# Patient Record
Sex: FEMALE | Race: OTHER | HISPANIC OR LATINO | URBAN - METROPOLITAN AREA
[De-identification: names, ages, dates, MRNs, and addresses within clinical notes are randomized per-mention and may not be internally consistent; named-entity substitution may affect disease eponyms.]

---

## 2017-12-29 ENCOUNTER — EMERGENCY (EMERGENCY)
Facility: HOSPITAL | Age: 82
LOS: 0 days | Discharge: HOME | End: 2017-12-29

## 2017-12-29 DIAGNOSIS — E78.5 HYPERLIPIDEMIA, UNSPECIFIED: ICD-10-CM

## 2017-12-29 DIAGNOSIS — R55 SYNCOPE AND COLLAPSE: ICD-10-CM

## 2017-12-29 DIAGNOSIS — F03.90 UNSPECIFIED DEMENTIA WITHOUT BEHAVIORAL DISTURBANCE: ICD-10-CM

## 2017-12-29 DIAGNOSIS — R25.1 TREMOR, UNSPECIFIED: ICD-10-CM

## 2017-12-29 DIAGNOSIS — I10 ESSENTIAL (PRIMARY) HYPERTENSION: ICD-10-CM

## 2017-12-29 DIAGNOSIS — S61.459A OPEN BITE OF UNSPECIFIED HAND, INITIAL ENCOUNTER: ICD-10-CM

## 2017-12-29 DIAGNOSIS — Z79.82 LONG TERM (CURRENT) USE OF ASPIRIN: ICD-10-CM

## 2018-02-07 ENCOUNTER — OUTPATIENT (OUTPATIENT)
Dept: OUTPATIENT SERVICES | Facility: HOSPITAL | Age: 83
LOS: 1 days | Discharge: HOME | End: 2018-02-07

## 2018-02-07 DIAGNOSIS — G40.919 EPILEPSY, UNSPECIFIED, INTRACTABLE, WITHOUT STATUS EPILEPTICUS: ICD-10-CM

## 2018-10-21 ENCOUNTER — EMERGENCY (EMERGENCY)
Facility: HOSPITAL | Age: 83
LOS: 0 days | Discharge: HOME | End: 2018-10-21
Attending: EMERGENCY MEDICINE | Admitting: EMERGENCY MEDICINE

## 2018-10-21 VITALS
DIASTOLIC BLOOD PRESSURE: 68 MMHG | SYSTOLIC BLOOD PRESSURE: 177 MMHG | TEMPERATURE: 99 F | OXYGEN SATURATION: 99 % | HEART RATE: 94 BPM | RESPIRATION RATE: 17 BRPM

## 2018-10-21 VITALS — WEIGHT: 113.98 LBS

## 2018-10-21 DIAGNOSIS — R33.9 RETENTION OF URINE, UNSPECIFIED: ICD-10-CM

## 2018-10-21 DIAGNOSIS — K56.41 FECAL IMPACTION: ICD-10-CM

## 2018-10-21 DIAGNOSIS — F03.90 UNSPECIFIED DEMENTIA WITHOUT BEHAVIORAL DISTURBANCE: ICD-10-CM

## 2018-10-21 LAB
ALBUMIN SERPL ELPH-MCNC: 3.5 G/DL — SIGNIFICANT CHANGE UP (ref 3.5–5.2)
ALP SERPL-CCNC: 58 U/L — SIGNIFICANT CHANGE UP (ref 30–115)
ALT FLD-CCNC: 13 U/L — SIGNIFICANT CHANGE UP (ref 0–41)
ANION GAP SERPL CALC-SCNC: 15 MMOL/L — HIGH (ref 7–14)
APPEARANCE UR: CLEAR — SIGNIFICANT CHANGE UP
AST SERPL-CCNC: 19 U/L — SIGNIFICANT CHANGE UP (ref 0–41)
BILIRUB SERPL-MCNC: 0.8 MG/DL — SIGNIFICANT CHANGE UP (ref 0.2–1.2)
BILIRUB UR-MCNC: NEGATIVE — SIGNIFICANT CHANGE UP
BUN SERPL-MCNC: 28 MG/DL — HIGH (ref 10–20)
CALCIUM SERPL-MCNC: 8.8 MG/DL — SIGNIFICANT CHANGE UP (ref 8.5–10.1)
CHLORIDE SERPL-SCNC: 99 MMOL/L — SIGNIFICANT CHANGE UP (ref 98–110)
CO2 SERPL-SCNC: 27 MMOL/L — SIGNIFICANT CHANGE UP (ref 17–32)
COLOR SPEC: SIGNIFICANT CHANGE UP
CREAT SERPL-MCNC: 1.8 MG/DL — HIGH (ref 0.7–1.5)
DIFF PNL FLD: ABNORMAL
EPI CELLS # UR: ABNORMAL /HPF
GLUCOSE SERPL-MCNC: 151 MG/DL — HIGH (ref 70–99)
GLUCOSE UR QL: NEGATIVE MG/DL — SIGNIFICANT CHANGE UP
HCT VFR BLD CALC: 37 % — SIGNIFICANT CHANGE UP (ref 37–47)
HGB BLD-MCNC: 12.2 G/DL — SIGNIFICANT CHANGE UP (ref 12–16)
KETONES UR-MCNC: NEGATIVE — SIGNIFICANT CHANGE UP
LACTATE SERPL-SCNC: 2.8 MMOL/L — HIGH (ref 0.5–2.2)
LEUKOCYTE ESTERASE UR-ACNC: NEGATIVE — SIGNIFICANT CHANGE UP
LIDOCAIN IGE QN: 8 U/L — SIGNIFICANT CHANGE UP (ref 7–60)
MCHC RBC-ENTMCNC: 31.7 PG — HIGH (ref 27–31)
MCHC RBC-ENTMCNC: 33 G/DL — SIGNIFICANT CHANGE UP (ref 32–37)
MCV RBC AUTO: 96.1 FL — SIGNIFICANT CHANGE UP (ref 81–99)
NITRITE UR-MCNC: NEGATIVE — SIGNIFICANT CHANGE UP
NRBC # BLD: 0 /100 WBCS — SIGNIFICANT CHANGE UP (ref 0–0)
PH UR: 7 — SIGNIFICANT CHANGE UP (ref 5–8)
PLATELET # BLD AUTO: 145 K/UL — SIGNIFICANT CHANGE UP (ref 130–400)
POTASSIUM SERPL-MCNC: 3.6 MMOL/L — SIGNIFICANT CHANGE UP (ref 3.5–5)
POTASSIUM SERPL-SCNC: 3.6 MMOL/L — SIGNIFICANT CHANGE UP (ref 3.5–5)
PROT SERPL-MCNC: 6.2 G/DL — SIGNIFICANT CHANGE UP (ref 6–8)
PROT UR-MCNC: ABNORMAL MG/DL
RBC # BLD: 3.85 M/UL — LOW (ref 4.2–5.4)
RBC # FLD: 13 % — SIGNIFICANT CHANGE UP (ref 11.5–14.5)
RBC CASTS # UR COMP ASSIST: ABNORMAL /HPF
SODIUM SERPL-SCNC: 141 MMOL/L — SIGNIFICANT CHANGE UP (ref 135–146)
SP GR SPEC: 1.01 — SIGNIFICANT CHANGE UP (ref 1.01–1.03)
UROBILINOGEN FLD QL: 1 MG/DL (ref 0.2–0.2)
WBC # BLD: 13.71 K/UL — HIGH (ref 4.8–10.8)
WBC # FLD AUTO: 13.71 K/UL — HIGH (ref 4.8–10.8)

## 2018-10-21 RX ORDER — SODIUM CHLORIDE 9 MG/ML
1000 INJECTION INTRAMUSCULAR; INTRAVENOUS; SUBCUTANEOUS ONCE
Qty: 0 | Refills: 0 | Status: COMPLETED | OUTPATIENT
Start: 2018-10-21 | End: 2018-10-21

## 2018-10-21 RX ADMIN — SODIUM CHLORIDE 1000 MILLILITER(S): 9 INJECTION INTRAMUSCULAR; INTRAVENOUS; SUBCUTANEOUS at 14:58

## 2018-10-21 NOTE — ED PROVIDER NOTE - MEDICAL DECISION MAKING DETAILS
Patient is a 89 yo f who presents with urinary retention that has been present over 1 day the patient has a history of dementia and cannot contribute to history. she is at her baseline mental status. she denies any abdominal pain but her daughter who is the primary historian describes her stool as loose. she denies any fevers or chills she denies any vomiting.  This has never happened to her in the past.  On physical exam she is nc/at perrla eomi oropharynx clear cta b/l, rrr s1s2 noted abd-soft nt nd bs+ bladder is distended but appears nontender, ext at baseline  from with no focal deficits  A/P- I will obtain labs, ua place nuñez cathter and continue to monitor at this time

## 2018-10-21 NOTE — ED ADULT NURSE REASSESSMENT NOTE - NS ED NURSE REASSESS COMMENT FT1
16 Fr silicon indwelling catheter placed via sterile technique, 300 mL initial urine output draining into urometer bag

## 2018-10-21 NOTE — ED PROVIDER NOTE - OBJECTIVE STATEMENT
Patient is a 89 yo f who presents with urinary retention that has been present over 1 day the patient has a history of dementia and cannot contribute to history. she is at her baseline mental status. she denies any abdominal pain but her daughter who is the primary historian describes her stool as loose. she denies any fevers or chills she denies any vomiting.  This has never happened to her in the past

## 2018-10-21 NOTE — ED PROVIDER NOTE - PROGRESS NOTE DETAILS
pts daughter at bedside, does not want pt to stay in hospital.. wants to take mother home and follow up with PMD

## 2018-10-22 LAB
CULTURE RESULTS: NO GROWTH — SIGNIFICANT CHANGE UP
SPECIMEN SOURCE: SIGNIFICANT CHANGE UP

## 2018-11-09 ENCOUNTER — EMERGENCY (EMERGENCY)
Facility: HOSPITAL | Age: 83
LOS: 0 days | Discharge: HOME | End: 2018-11-09
Attending: EMERGENCY MEDICINE | Admitting: EMERGENCY MEDICINE

## 2018-11-09 VITALS
RESPIRATION RATE: 18 BRPM | DIASTOLIC BLOOD PRESSURE: 60 MMHG | TEMPERATURE: 98 F | SYSTOLIC BLOOD PRESSURE: 117 MMHG | HEART RATE: 74 BPM | OXYGEN SATURATION: 98 %

## 2018-11-09 VITALS
TEMPERATURE: 99 F | RESPIRATION RATE: 18 BRPM | HEART RATE: 70 BPM | OXYGEN SATURATION: 98 % | DIASTOLIC BLOOD PRESSURE: 60 MMHG | SYSTOLIC BLOOD PRESSURE: 128 MMHG

## 2018-11-09 DIAGNOSIS — Z86.73 PERSONAL HISTORY OF TRANSIENT ISCHEMIC ATTACK (TIA), AND CEREBRAL INFARCTION WITHOUT RESIDUAL DEFICITS: ICD-10-CM

## 2018-11-09 DIAGNOSIS — I10 ESSENTIAL (PRIMARY) HYPERTENSION: ICD-10-CM

## 2018-11-09 DIAGNOSIS — R41.82 ALTERED MENTAL STATUS, UNSPECIFIED: ICD-10-CM

## 2018-11-09 DIAGNOSIS — R40.1 STUPOR: ICD-10-CM

## 2018-11-09 PROBLEM — R33.9 RETENTION OF URINE, UNSPECIFIED: Chronic | Status: ACTIVE | Noted: 2018-10-21

## 2018-11-09 LAB
ALBUMIN SERPL ELPH-MCNC: 3.1 G/DL — LOW (ref 3.5–5.2)
ALP SERPL-CCNC: 55 U/L — SIGNIFICANT CHANGE UP (ref 30–115)
ALT FLD-CCNC: 17 U/L — SIGNIFICANT CHANGE UP (ref 0–41)
ANION GAP SERPL CALC-SCNC: 14 MMOL/L — SIGNIFICANT CHANGE UP (ref 7–14)
APPEARANCE UR: CLEAR — SIGNIFICANT CHANGE UP
AST SERPL-CCNC: 20 U/L — SIGNIFICANT CHANGE UP (ref 0–41)
BILIRUB SERPL-MCNC: 0.6 MG/DL — SIGNIFICANT CHANGE UP (ref 0.2–1.2)
BILIRUB UR-MCNC: NEGATIVE — SIGNIFICANT CHANGE UP
BUN SERPL-MCNC: 22 MG/DL — HIGH (ref 10–20)
CALCIUM SERPL-MCNC: 8.7 MG/DL — SIGNIFICANT CHANGE UP (ref 8.5–10.1)
CHLORIDE SERPL-SCNC: 98 MMOL/L — SIGNIFICANT CHANGE UP (ref 98–110)
CO2 SERPL-SCNC: 26 MMOL/L — SIGNIFICANT CHANGE UP (ref 17–32)
COLOR SPEC: YELLOW — SIGNIFICANT CHANGE UP
CREAT SERPL-MCNC: 0.8 MG/DL — SIGNIFICANT CHANGE UP (ref 0.7–1.5)
DIFF PNL FLD: ABNORMAL
GLUCOSE SERPL-MCNC: 87 MG/DL — SIGNIFICANT CHANGE UP (ref 70–99)
GLUCOSE UR QL: NEGATIVE MG/DL — SIGNIFICANT CHANGE UP
HCT VFR BLD CALC: 33 % — LOW (ref 37–47)
HGB BLD-MCNC: 11 G/DL — LOW (ref 12–16)
KETONES UR-MCNC: NEGATIVE — SIGNIFICANT CHANGE UP
LEUKOCYTE ESTERASE UR-ACNC: ABNORMAL
MCHC RBC-ENTMCNC: 31.5 PG — HIGH (ref 27–31)
MCHC RBC-ENTMCNC: 33.3 G/DL — SIGNIFICANT CHANGE UP (ref 32–37)
MCV RBC AUTO: 94.6 FL — SIGNIFICANT CHANGE UP (ref 81–99)
NITRITE UR-MCNC: NEGATIVE — SIGNIFICANT CHANGE UP
NRBC # BLD: 0 /100 WBCS — SIGNIFICANT CHANGE UP (ref 0–0)
PH UR: 7 — SIGNIFICANT CHANGE UP (ref 5–8)
PLATELET # BLD AUTO: 138 K/UL — SIGNIFICANT CHANGE UP (ref 130–400)
POTASSIUM SERPL-MCNC: 3.9 MMOL/L — SIGNIFICANT CHANGE UP (ref 3.5–5)
POTASSIUM SERPL-SCNC: 3.9 MMOL/L — SIGNIFICANT CHANGE UP (ref 3.5–5)
PROT SERPL-MCNC: 5.9 G/DL — LOW (ref 6–8)
PROT UR-MCNC: NEGATIVE MG/DL — SIGNIFICANT CHANGE UP
RBC # BLD: 3.49 M/UL — LOW (ref 4.2–5.4)
RBC # FLD: 13.4 % — SIGNIFICANT CHANGE UP (ref 11.5–14.5)
RBC CASTS # UR COMP ASSIST: ABNORMAL /HPF
SODIUM SERPL-SCNC: 138 MMOL/L — SIGNIFICANT CHANGE UP (ref 135–146)
SP GR SPEC: 1.01 — SIGNIFICANT CHANGE UP (ref 1.01–1.03)
TROPONIN T SERPL-MCNC: <0.01 NG/ML — SIGNIFICANT CHANGE UP
UROBILINOGEN FLD QL: 1 MG/DL (ref 0.2–0.2)
WBC # BLD: 8.49 K/UL — SIGNIFICANT CHANGE UP (ref 4.8–10.8)
WBC # FLD AUTO: 8.49 K/UL — SIGNIFICANT CHANGE UP (ref 4.8–10.8)
WBC UR QL: SIGNIFICANT CHANGE UP /HPF

## 2018-11-09 NOTE — ED PROVIDER NOTE - PHYSICAL EXAMINATION
PHYSICAL EXAM: I have reviewed current vital signs.  GENERAL: NAD, frail, elderly.  HEAD:  Normocephalic, atraumatic.  EYES: PERRL, conjunctiva and sclera clear, arcus senilis bilaterally.  ENT: MMM, no erythema/exudates.  NECK: Supple, no JVD.  CHEST/LUNG: Clear to auscultation bilaterally; no wheezes, rales, or rhonchi.  HEART: Regular rate and rhythm, normal S1 and S2; no murmurs, rubs, or gallops.  ABDOMEN: Soft, nontender, nondistended; bowel sounds present.  EXTREMITIES:  2+ peripheral pulses; no edema.  NEUROLOGY: Alert and oriented to person and place, disoriented to time. Motor 5/5. No focal neurological deficits. Answering questions appropriately.  SKIN: No rashes or lesions.

## 2018-11-09 NOTE — ED ADULT TRIAGE NOTE - CHIEF COMPLAINT QUOTE
BIBA as per family pt was unresponsive in the house for a few minutes in the chair, pt is currently alert and oriented in triage and is awake and talking. FS In triage is 100

## 2018-11-09 NOTE — ED PROVIDER NOTE - ATTENDING CONTRIBUTION TO CARE
89 yo F with h/o TIA, HTN, dementia, c/o episode of not responding as per family. Family states that pt had just had a conversation on the phone and was sitting at her table and then stopped responding for a few seconds. Daughter states that pt appeared to be sleeping during the episode but did not have any difficulty breathing. Episode lasted for a few seconds and resolved on its own. Pt had no seizure-like activity. Pt had no incontinence or tongue biting. Pt did not fall or hit her head. Pt has been at her baseline, no recent fever/chills, ha, cough, chest pain, sob, abdominal pain, n/v/d. Pt is currently without complaints. a/p: vss, pt appears in nad, AAOx2 (baseline as per family), nontoxic appearing, ncat, perrla, norm cardiac exam, lungs cta b/l, no w/r/r, abd is soft and nt, no pedal edema, motor strength 5/5 b/l UE and LE, 2+ pulses throughout, will check labs, ekg, ua, ct head and reassess

## 2018-11-09 NOTE — ED PROVIDER NOTE - MEDICAL DECISION MAKING DETAILS
89 yo F with h/o TIA, HTN, dementia, c/o episode of not responding as per family. Pt at baseline during entire stay in ED. Initial labs, ekg, cxr, and ekg stable. Discussed at length with family re: plan for pt. Family prefers to take pt home and will f/u as outpatient. Family given precautions on when to return with pt. Family understands but prefers to take pt home given age and dementia. Pt is agreeable with plan

## 2018-11-09 NOTE — ED PROVIDER NOTE - PROGRESS NOTE DETAILS
Informed patient and family of lab and radiology results. Will DC home and encourage close outpatient follow-up with her doctors. Precaution signs/sxs given of when to return to ED. Full DC instructions and precaution signs and symptoms discussed. Proper follow up ensured.  All questions and concerns from patient addressed.  Understanding of instructions verbalized. Placed nuñez as patient was unable to urinate on her own. She only ate breakfast today and has not been drinking much since being in the ED. Did give her juice. Informed patient and family of lab and radiology results. Will remove ASHLYN nuñez home, and encourage close outpatient follow-up with her doctors. Precaution signs/sxs given of when to return to ED. Full DC instructions and precaution signs and symptoms discussed. Proper follow up ensured.  All questions and concerns from patient addressed.  Understanding of instructions verbalized.

## 2018-11-09 NOTE — ED PROVIDER NOTE - NS ED ROS FT
Per patient and family friend/daughter-  Constitutional:  No fevers or chills.  Eyes:  No visual changes, eye pain, or discharge.  ENT:  No hearing changes. No ear pain or sore throat.  Neck:  No neck pain or stiffness.  Cardiac:  No CP or edema.  Resp:  No cough or SOB. No hemoptysis.   GI:  No nausea, vomiting, diarrhea, or abdominal pain.  :  No dysuria, frequency, or hematuria.  MSK:  No myalgias or joint pain/swelling.  Neuro:  No headache, dizziness, or weakness.  Skin:  No skin rash. Per patient and family friend/daughter-  Constitutional:  No fevers or chills.  Eyes:  No eye pain or discharge.  ENT:  No hearing changes. No ear pain or sore throat.  Neck:  No neck pain or stiffness.  Cardiac:  No CP or edema.  Resp:  No cough or SOB. No hemoptysis.   GI:  No vomiting, diarrhea, or abdominal pain.  :  No dysuria, frequency, or hematuria.  MSK:  No myalgias or joint pain/swelling.  Neuro:  No headache, dizziness, or weakness.  Skin:  No skin rash.

## 2018-11-09 NOTE — ED PROVIDER NOTE - OBJECTIVE STATEMENT
89yr F with PMH of TIA many yrs ago, mild dementia, previous urinary retention, and constipation presenting with brief episode of AMS that happened earlier this afternoon. Per family friend and daughter, patient was not answering questions and became less responsive. No LOC or injuries/trauma. Denies any CP, SOB, abdominal pain, back pain, f/c, recent sickness, or recent hospitalization. She was recently seen here in the ED on 10/21/18 for urinary retention. 89yr F with PMH of TIA many yrs ago, mild dementia, previous urinary retention, and constipation presenting with brief episode of AMS that happened earlier this afternoon. Per family friend and daughter, patient was not answering questions and became less responsive earlier this afternoon. She had a normal conversation on the phone prior to her episode of AMS that lasted for seconds. Daughter states she was tapping her mother's face, she did not respond, and she appeared to be sleeping. No seizure like activity, injuries/trauma, incontinence, or tongue biting. Denies any CP, SOB, abdominal pain, back pain, f/c, recent sickness, or recent hospitalization. She was recently seen here in the ED on 10/21/18 for urinary retention, had a nuñez in place for about 3 days and was removed after. She has been urinating without difficulty and having normal BMs. 89yo F with PMH of TIA many yrs ago, mild dementia, previous urinary retention, and constipation presenting with brief episode of AMS that happened earlier this afternoon. Per family friend and daughter, patient was not answering questions and became less responsive. She had a normal conversation on the phone prior to her episode of AMS that lasted for seconds. Daughter states she was tapping her mother's face, she did not respond, and she appeared to be sleeping. No seizure like activity, injuries/trauma, incontinence, or tongue biting. Denies any CP, SOB, abdominal pain, back pain, f/c, recent sickness, or recent hospitalization. She was recently seen here in the ED on 10/21/18 for urinary retention, had a nuñez in place for about 3 days and was removed after. She has been urinating without difficulty and having normal BMs.

## 2018-11-11 LAB
CULTURE RESULTS: SIGNIFICANT CHANGE UP
SPECIMEN SOURCE: SIGNIFICANT CHANGE UP

## 2018-11-12 RX ORDER — CEPHALEXIN 500 MG
1 CAPSULE ORAL
Qty: 21 | Refills: 0
Start: 2018-11-12 | End: 2018-11-18

## 2018-11-12 NOTE — ED POST DISCHARGE NOTE - RESULT SUMMARY
? + UCX- STREP AGALACTICAE- SUSCEPTIBLE TO PCN AND CEFAZOLIN. ? + UCX- STREP AGALACTICAE > 100,000:  SUSCEPTIBLE TO PCN AND CEFAZOLIN.

## 2018-11-19 ENCOUNTER — EMERGENCY (EMERGENCY)
Facility: HOSPITAL | Age: 83
LOS: 0 days | Discharge: HOME | End: 2018-11-19
Attending: EMERGENCY MEDICINE

## 2018-11-19 VITALS
DIASTOLIC BLOOD PRESSURE: 67 MMHG | HEART RATE: 80 BPM | OXYGEN SATURATION: 97 % | TEMPERATURE: 98 F | SYSTOLIC BLOOD PRESSURE: 157 MMHG | RESPIRATION RATE: 18 BRPM

## 2018-11-19 VITALS
DIASTOLIC BLOOD PRESSURE: 87 MMHG | TEMPERATURE: 97 F | HEART RATE: 76 BPM | RESPIRATION RATE: 18 BRPM | SYSTOLIC BLOOD PRESSURE: 170 MMHG

## 2018-11-19 DIAGNOSIS — Z79.2 LONG TERM (CURRENT) USE OF ANTIBIOTICS: ICD-10-CM

## 2018-11-19 DIAGNOSIS — R41.82 ALTERED MENTAL STATUS, UNSPECIFIED: ICD-10-CM

## 2018-11-19 DIAGNOSIS — I10 ESSENTIAL (PRIMARY) HYPERTENSION: ICD-10-CM

## 2018-11-19 DIAGNOSIS — E78.00 PURE HYPERCHOLESTEROLEMIA, UNSPECIFIED: ICD-10-CM

## 2018-11-19 DIAGNOSIS — F03.90 UNSPECIFIED DEMENTIA WITHOUT BEHAVIORAL DISTURBANCE: ICD-10-CM

## 2018-11-19 PROBLEM — K59.00 CONSTIPATION, UNSPECIFIED: Chronic | Status: ACTIVE | Noted: 2018-11-09

## 2018-11-19 PROBLEM — G45.9 TRANSIENT CEREBRAL ISCHEMIC ATTACK, UNSPECIFIED: Chronic | Status: ACTIVE | Noted: 2018-11-09

## 2018-11-19 LAB
ALBUMIN SERPL ELPH-MCNC: 3.4 G/DL — LOW (ref 3.5–5.2)
ALP SERPL-CCNC: 57 U/L — SIGNIFICANT CHANGE UP (ref 30–115)
ALT FLD-CCNC: 23 U/L — SIGNIFICANT CHANGE UP (ref 0–41)
ANION GAP SERPL CALC-SCNC: 12 MMOL/L — SIGNIFICANT CHANGE UP (ref 7–14)
AST SERPL-CCNC: 25 U/L — SIGNIFICANT CHANGE UP (ref 0–41)
BASOPHILS # BLD AUTO: 0.03 K/UL — SIGNIFICANT CHANGE UP (ref 0–0.2)
BASOPHILS NFR BLD AUTO: 0.4 % — SIGNIFICANT CHANGE UP (ref 0–1)
BILIRUB SERPL-MCNC: 0.7 MG/DL — SIGNIFICANT CHANGE UP (ref 0.2–1.2)
BUN SERPL-MCNC: 17 MG/DL — SIGNIFICANT CHANGE UP (ref 10–20)
CALCIUM SERPL-MCNC: 9.2 MG/DL — SIGNIFICANT CHANGE UP (ref 8.5–10.1)
CHLORIDE SERPL-SCNC: 101 MMOL/L — SIGNIFICANT CHANGE UP (ref 98–110)
CO2 SERPL-SCNC: 27 MMOL/L — SIGNIFICANT CHANGE UP (ref 17–32)
CREAT SERPL-MCNC: 0.8 MG/DL — SIGNIFICANT CHANGE UP (ref 0.7–1.5)
EOSINOPHIL # BLD AUTO: 0.2 K/UL — SIGNIFICANT CHANGE UP (ref 0–0.7)
EOSINOPHIL NFR BLD AUTO: 2.5 % — SIGNIFICANT CHANGE UP (ref 0–8)
GLUCOSE SERPL-MCNC: 94 MG/DL — SIGNIFICANT CHANGE UP (ref 70–99)
HCT VFR BLD CALC: 35.7 % — LOW (ref 37–47)
HGB BLD-MCNC: 11.6 G/DL — LOW (ref 12–16)
IMM GRANULOCYTES NFR BLD AUTO: 0.4 % — HIGH (ref 0.1–0.3)
LYMPHOCYTES # BLD AUTO: 1.99 K/UL — SIGNIFICANT CHANGE UP (ref 1.2–3.4)
LYMPHOCYTES # BLD AUTO: 24.7 % — SIGNIFICANT CHANGE UP (ref 20.5–51.1)
MCHC RBC-ENTMCNC: 31.3 PG — HIGH (ref 27–31)
MCHC RBC-ENTMCNC: 32.5 G/DL — SIGNIFICANT CHANGE UP (ref 32–37)
MCV RBC AUTO: 96.2 FL — SIGNIFICANT CHANGE UP (ref 81–99)
MONOCYTES # BLD AUTO: 0.51 K/UL — SIGNIFICANT CHANGE UP (ref 0.1–0.6)
MONOCYTES NFR BLD AUTO: 6.3 % — SIGNIFICANT CHANGE UP (ref 1.7–9.3)
NEUTROPHILS # BLD AUTO: 5.29 K/UL — SIGNIFICANT CHANGE UP (ref 1.4–6.5)
NEUTROPHILS NFR BLD AUTO: 65.7 % — SIGNIFICANT CHANGE UP (ref 42.2–75.2)
NRBC # BLD: 0 /100 WBCS — SIGNIFICANT CHANGE UP (ref 0–0)
PLATELET # BLD AUTO: 145 K/UL — SIGNIFICANT CHANGE UP (ref 130–400)
POTASSIUM SERPL-MCNC: 4.1 MMOL/L — SIGNIFICANT CHANGE UP (ref 3.5–5)
POTASSIUM SERPL-SCNC: 4.1 MMOL/L — SIGNIFICANT CHANGE UP (ref 3.5–5)
PROT SERPL-MCNC: 6.1 G/DL — SIGNIFICANT CHANGE UP (ref 6–8)
RBC # BLD: 3.71 M/UL — LOW (ref 4.2–5.4)
RBC # FLD: 13.9 % — SIGNIFICANT CHANGE UP (ref 11.5–14.5)
SODIUM SERPL-SCNC: 140 MMOL/L — SIGNIFICANT CHANGE UP (ref 135–146)
TROPONIN T SERPL-MCNC: <0.01 NG/ML — SIGNIFICANT CHANGE UP
TROPONIN T SERPL-MCNC: <0.01 NG/ML — SIGNIFICANT CHANGE UP
WBC # BLD: 8.05 K/UL — SIGNIFICANT CHANGE UP (ref 4.8–10.8)
WBC # FLD AUTO: 8.05 K/UL — SIGNIFICANT CHANGE UP (ref 4.8–10.8)

## 2018-11-19 NOTE — ED PROVIDER NOTE - PHYSICAL EXAMINATION
GEN: Alert & Oriented x 2 at baseline, No acute distress. Calm, appropriate.  Eyes: PERRL. EOMI. No conjunctival injection. No scleral icterus.   RESP: Lungs clear to auscult bilat. no wheezes, rhonchi or rales. No retractions. Equal air entry.  CARDIO: regular rate and rhythm, no murmurs, rubs or gallops. Normal S1, S2. Radial pulses 2+ bilaterally. No lower extremity edema.  ABD: Soft, Nondistended. No rebound tenderness/guarding.  No pulsatile mass. No tenderness with light and deep palpation.  MS: Full ROM of extremities. No CVA tenderness.   SKIN: no rashes/lesions, no petechiae, no ecchymosis.  Neuro: A&O x3, CN II- XII intact, strength 5/5 throughout extremities, sensation intact. Speech & mentation intact. No drooping of eye or mouth. Without dysarthria or aphasia. Finger to nose intact. Romberg Neg.

## 2018-11-19 NOTE — ED PROVIDER NOTE - OBJECTIVE STATEMENT
The patient is a 89y Female with PMH TIA, HTN, Hyperlipidemia, and dementia presenting to ED via EMS for AMS. Patient is a poor historian due to baseline dementia. Daughter states she woke up this am and was her normal self. She states she went back to sleep and when she tried The patient is a 89y Female with PMH TIA, HTN, Hyperlipidemia, and dementia presenting to ED via EMS for AMS. Daughter states patient woke up this am and was her normal self. She states she went back to sleep and when she tried to wake her up she was unable to. She states she was unresponsive for about 10min. She tried slapping her cheeks and chest and was unable to wake her up. She called EMS and by the time she arrived she was awake. Daughter denies any trauma & seizure like activity. Daughter states she is acting normal. Patient denies chest pain, shortness of breath, abdominal pain, f/c, headache, dizziness and blurry vision. Patient is currently being treated for a UTI and states she does have some dysuria. She has been taking keflex without issues. She was seen here on Nov. 9th for the same unresponsive episode. They did a syncope work-up without any concerning findings. She was discharged home with UTI. She was also seen The patient is a 89y Female with PMH TIA, HTN, Hyperlipidemia, and dementia presenting to ED via EMS for AMS. Daughter states patient woke up this am and was her normal self. She states she went back to sleep and when she tried to wake her up she was unable to. Daughter states she was unresponsive for about 10min. She tried slapping her cheeks and chest and was unable to wake her up. She called EMS and by the time they arrived she was awake. Daughter denies any trauma & seizure like activity. Daughter states she is acting her normal self. Patient denies chest pain, shortness of breath, abdominal pain, f/c, headache, dizziness and blurry vision. Patient is currently being treated for a UTI and states she does have some dysuria. She has been taking keflex without issues. She was seen here on Nov. 9th for the same unresponsive episode. They did a syncope work-up without any concerning findings. She was discharged home with UTI. She was also seen in October for urinary retention due to constipation, but daughter states she has been urinating without issues. She however states she hasn't urinated yet today. The patient is a 89y Female with PMH TIA, HTN, Hyperlipidemia, and dementia presenting to ED via EMS for AMS. Daughter states patient woke up this am and was her normal self. She states she went back to sleep and when she tried to wake her up she was unable to. Daughter states she was unresponsive for about 10min. She tried slapping her cheeks and chest and was unable to wake her up. She called EMS and by the time they arrived she was awake. Daughter denies any trauma & seizure like activity. Daughter states she is acting her normal self. Patient denies chest pain, shortness of breath, abdominal pain, f/c, headache, dizziness and blurry vision. She was seen here on Nov. 9th for the same unresponsive episode. They did a syncope work-up without any concerning findings. She was discharged home with UTI.

## 2018-11-19 NOTE — ED PROVIDER NOTE - MEDICAL DECISION MAKING DETAILS
Pt reassessed, asymptomatic in ED. Labs reviewed with pt and daughter. Pt observed in ED throughout day. Tolerated full tray, no HA, dizziness, CP, N/V, abdominal pina. Ambulated around ED, went to bathroom. Daughter did not want pt admitted so serial troponin done x 2 and cardio referral given to daughter for follow up and agreed. (Handwritten on d/c papaers).  Advised close follow up with PMD for reevaluation and pt/daughter agreed.  Return precautions advised and pt/daughter verbalized understanding.

## 2018-11-19 NOTE — ED PROVIDER NOTE - CONDUCTED A DETAILED DISCUSSION WITH PATIENT AND/OR GUARDIAN REGARDING, MDM
need for outpatient follow-up/lab results/radiology results return to ED if symptoms worsen, persist or questions arise/need for outpatient follow-up/radiology results/lab results

## 2018-11-19 NOTE — ED PROVIDER NOTE - NS ED ROS FT
GEN: (-) fever, (-) chills  HEENT: (-) vision changes, (-) HA  CV: (-) chest pain, (-) palpitations, (-) edema  PULM: (-) cough, (-) wheezing, (-) dyspnea  GI: (-) abdominal pain,(-) Nausea, (-) Vomiting, (-) Diarrhea, (-) Melena  NEURO: (-) weakness, (-) paresthesias, (+)AMS  : (-) dysuria, (-) frequency, (-) urgency, (-) incontinence   MS: (-) back pain, (-) joint pain, (-)myalgias, (-) swelling  SKIN: (-) rashes, (-) jaundice  HEME: (-) bleeding, (-) ecchymosis

## 2018-11-19 NOTE — ED PROVIDER NOTE - ATTENDING CONTRIBUTION TO CARE
89 yo female BIB daughter for c/o unresponsiveness today. Pt had brief similar episode last week as well and was brought in for evaluation. Pt daughter states she woke her up in bed and she was still tired and so she said she'll "let he have 10 more minutes" and went back to awaken her and could not. IN ED, pt awake and alert, mental status t baseline and without complaints. Daughter denies any falls trauma, fevers, change in appetite or behavior, shaking or foaming or signs seizure. Pt denies any CP, SOB, palpitations, V/D or abdominal pain. Pt has not had any recent cardiac workup, has PMD but not cardiologist. Daughter prefers mother not be admitted.    VITAL SIGNS: noted  CONSTITUTIONAL: Well-developed; well-nourished; in no acute distress  HEAD: Normocephalic; atraumatic  EYES: PERRL, EOM intact; conjunctiva and sclera clear  ENT: No nasal discharge; airway clear. MMM  NECK: Supple; non tender.    CARD: S1, S2 normal; no murmurs, gallops, or rubs. Regular rate and rhythm  RESP: CTAB/L, no wheezes, rales or rhonchi  ABD: Normal bowel sounds; soft; non-distended; non-tender; no hepatosplenomegaly. No CVA tenderness  EXT: Normal ROM. No calf tenderness or edema. Distal pulses intact  NEURO: Alert, oriented. Grossly unremarkable. No focal deficits. Moving all extremities.   SKIN: Skin exam is warm and dry, no acute rash

## 2018-11-19 NOTE — ED PROVIDER NOTE - PROGRESS NOTE DETAILS
Daughter wants to take mother home due to age and hx of dementia. Will discharge home. Patient well appearing, no complaints, walked to bathroom and ate a tray.

## 2018-11-19 NOTE — ED PROVIDER NOTE - PMH
Constipation    TIA (transient ischemic attack)    Urinary retention Constipation    Hyperlipidemia    Hypertension    TIA (transient ischemic attack)    Urinary retention

## 2018-11-19 NOTE — ED ADULT NURSE NOTE - INTERVENTIONS DEFINITIONS
Monitor gait and stability/Provide visual cue, wrist band, yellow gown, etc./Non-slip footwear when patient is off stretcher/Stretcher in lowest position, wheels locked, appropriate side rails in place/Monitor for mental status changes and reorient to person, place, and time

## 2019-03-11 ENCOUNTER — OUTPATIENT (OUTPATIENT)
Dept: OUTPATIENT SERVICES | Facility: HOSPITAL | Age: 84
LOS: 1 days | Discharge: HOME | End: 2019-03-11

## 2019-03-11 DIAGNOSIS — N39.0 URINARY TRACT INFECTION, SITE NOT SPECIFIED: ICD-10-CM

## 2019-03-11 PROBLEM — E78.5 HYPERLIPIDEMIA, UNSPECIFIED: Chronic | Status: ACTIVE | Noted: 2018-11-19

## 2019-03-11 PROBLEM — I10 ESSENTIAL (PRIMARY) HYPERTENSION: Chronic | Status: ACTIVE | Noted: 2018-11-19

## 2019-06-03 ENCOUNTER — OUTPATIENT (OUTPATIENT)
Dept: OUTPATIENT SERVICES | Facility: HOSPITAL | Age: 84
LOS: 1 days | Discharge: HOME | End: 2019-06-03

## 2019-06-03 DIAGNOSIS — E78.5 HYPERLIPIDEMIA, UNSPECIFIED: ICD-10-CM

## 2019-06-03 DIAGNOSIS — R94.31 ABNORMAL ELECTROCARDIOGRAM [ECG] [EKG]: ICD-10-CM

## 2019-06-03 DIAGNOSIS — N32.81 OVERACTIVE BLADDER: ICD-10-CM

## 2019-06-03 DIAGNOSIS — I10 ESSENTIAL (PRIMARY) HYPERTENSION: ICD-10-CM

## 2019-06-03 DIAGNOSIS — G30.9 ALZHEIMER'S DISEASE, UNSPECIFIED: ICD-10-CM

## 2019-06-03 DIAGNOSIS — I63.9 CEREBRAL INFARCTION, UNSPECIFIED: ICD-10-CM

## 2019-06-17 ENCOUNTER — EMERGENCY (EMERGENCY)
Facility: HOSPITAL | Age: 84
LOS: 0 days | Discharge: HOME | End: 2019-06-18
Attending: EMERGENCY MEDICINE | Admitting: EMERGENCY MEDICINE
Payer: MEDICARE

## 2019-06-17 VITALS
HEART RATE: 70 BPM | TEMPERATURE: 96 F | WEIGHT: 113.98 LBS | SYSTOLIC BLOOD PRESSURE: 105 MMHG | HEIGHT: 61 IN | RESPIRATION RATE: 20 BRPM | OXYGEN SATURATION: 100 % | DIASTOLIC BLOOD PRESSURE: 55 MMHG

## 2019-06-17 DIAGNOSIS — R11.10 VOMITING, UNSPECIFIED: ICD-10-CM

## 2019-06-17 DIAGNOSIS — Z79.899 OTHER LONG TERM (CURRENT) DRUG THERAPY: ICD-10-CM

## 2019-06-17 DIAGNOSIS — R19.7 DIARRHEA, UNSPECIFIED: ICD-10-CM

## 2019-06-17 DIAGNOSIS — R55 SYNCOPE AND COLLAPSE: ICD-10-CM

## 2019-06-17 LAB
BASOPHILS # BLD AUTO: 0.02 K/UL — SIGNIFICANT CHANGE UP (ref 0–0.2)
BASOPHILS NFR BLD AUTO: 0.2 % — SIGNIFICANT CHANGE UP (ref 0–1)
EOSINOPHIL # BLD AUTO: 0.07 K/UL — SIGNIFICANT CHANGE UP (ref 0–0.7)
EOSINOPHIL NFR BLD AUTO: 0.6 % — SIGNIFICANT CHANGE UP (ref 0–8)
HCT VFR BLD CALC: 36.4 % — LOW (ref 37–47)
HGB BLD-MCNC: 12.2 G/DL — SIGNIFICANT CHANGE UP (ref 12–16)
IMM GRANULOCYTES NFR BLD AUTO: 0.4 % — HIGH (ref 0.1–0.3)
LYMPHOCYTES # BLD AUTO: 1.8 K/UL — SIGNIFICANT CHANGE UP (ref 1.2–3.4)
LYMPHOCYTES # BLD AUTO: 16.7 % — LOW (ref 20.5–51.1)
MCHC RBC-ENTMCNC: 31.5 PG — HIGH (ref 27–31)
MCHC RBC-ENTMCNC: 33.5 G/DL — SIGNIFICANT CHANGE UP (ref 32–37)
MCV RBC AUTO: 94.1 FL — SIGNIFICANT CHANGE UP (ref 81–99)
MONOCYTES # BLD AUTO: 0.77 K/UL — HIGH (ref 0.1–0.6)
MONOCYTES NFR BLD AUTO: 7.1 % — SIGNIFICANT CHANGE UP (ref 1.7–9.3)
NEUTROPHILS # BLD AUTO: 8.11 K/UL — HIGH (ref 1.4–6.5)
NEUTROPHILS NFR BLD AUTO: 75 % — SIGNIFICANT CHANGE UP (ref 42.2–75.2)
NRBC # BLD: 0 /100 WBCS — SIGNIFICANT CHANGE UP (ref 0–0)
PLATELET # BLD AUTO: 157 K/UL — SIGNIFICANT CHANGE UP (ref 130–400)
RBC # BLD: 3.87 M/UL — LOW (ref 4.2–5.4)
RBC # FLD: 13 % — SIGNIFICANT CHANGE UP (ref 11.5–14.5)
WBC # BLD: 10.81 K/UL — HIGH (ref 4.8–10.8)
WBC # FLD AUTO: 10.81 K/UL — HIGH (ref 4.8–10.8)

## 2019-06-17 PROCEDURE — 93010 ELECTROCARDIOGRAM REPORT: CPT

## 2019-06-17 PROCEDURE — 29125 APPL SHORT ARM SPLINT STATIC: CPT

## 2019-06-17 PROCEDURE — 99285 EMERGENCY DEPT VISIT HI MDM: CPT | Mod: 25

## 2019-06-17 RX ORDER — SODIUM CHLORIDE 9 MG/ML
1000 INJECTION, SOLUTION INTRAVENOUS ONCE
Refills: 0 | Status: COMPLETED | OUTPATIENT
Start: 2019-06-17 | End: 2019-06-17

## 2019-06-17 RX ADMIN — SODIUM CHLORIDE 1000 MILLILITER(S): 9 INJECTION, SOLUTION INTRAVENOUS at 23:58

## 2019-06-17 NOTE — ED PROVIDER NOTE - NS ED ROS FT
Review of Systems    Constitutional: (-) fever  Cardiovascular: (-) chest pain, (-) syncope  Respiratory: (-) cough, (-) shortness of breath  Gastrointestinal: (+) vomiting, (+) diarrhea, (-) abdominal pain  Musculoskeletal: (-) neck pain, (-) back pain, (-) joint pain  Integumentary: (-) rash, (-) edema  Neurological: (-) headache, (-) altered mental status    Except as documented in the HPI, all other systems are negative. Review of Systems    Constitutional: (-) fever  Cardiovascular: (-) chest pain  Respiratory: (-) cough, (-) shortness of breath  Gastrointestinal: (+) vomiting, (+) diarrhea, (-) abdominal pain  Musculoskeletal: (-) neck pain, (-) back pain, (-) joint pain  Integumentary: (-) rash, (-) edema  Neurological: (-) headache    Except as documented in the HPI, all other systems are negative.

## 2019-06-17 NOTE — ED ADULT NURSE NOTE - OBJECTIVE STATEMENT
pt is a 89 yo female, pw syncope post vomiting. family sts pt was sitting after having a meal, had one episode of vomiting followed by unresponsive monique for 2 minutes. pt is a currently at [facility]. denies SOB, nausea, diarrhea, chest pain, diaphoresis, headache, dizziness, loc, cough, fever, trauma numbness, tingling, urinary symptoms, travel.

## 2019-06-17 NOTE — ED PROVIDER NOTE - CARE PROVIDER_API CALL
Mario Sadler)  Cardiovascular Disease; Internal Medicine  [address] [place] [address]  Phone: [phone]  Fax: [phone]  Follow Up Time: Routine

## 2019-06-17 NOTE — ED PROVIDER NOTE - PMH
Constipation    Hyperlipidemia    Hypertension    TIA (transient ischemic attack)    Urinary retention

## 2019-06-17 NOTE — ED ADULT TRIAGE NOTE - CHIEF COMPLAINT QUOTE
BIBA with complaints of unresponsiveness after vomiting, had diarrhea this evening. Pt awake in triage

## 2019-06-17 NOTE — ED PROVIDER NOTE - OBJECTIVE STATEMENT
89yF with PMH HTN, HLD, mild dementia, isolated seizure in dec 17 not on meds, TIA sept 2003, cardiologist Dr Ruiz p/w 1 loose stool today, one episode of shaking and spitting up clear fluid about half hour after drinking tea. at that time she was less responsive than normal but never syncopal and ems was called. pt was feeling normal prior to this. no n/v abd pain fever chills cp sob palpations fnd ha.

## 2019-06-17 NOTE — ED PROVIDER NOTE - CLINICAL SUMMARY MEDICAL DECISION MAKING FREE TEXT BOX
89f w episode of vomiting and then brief episode of nonresponsiveness. no fall, no trauma. nontoxic appearing, n/v intact. prior hx of TIA & single seizure episode; no witnessed seizure today, nonfocal neuro. Labs, EKG, & imaging reviewed. goals of care d/w family who wish to bring patient home and continue care as outpatient. strict return precautions given. Family advised regarding symptomatic/supportive care, importance of PMD f/u, and symptoms to prompt ED return. Copy of results given to patient.

## 2019-06-17 NOTE — ED PROVIDER NOTE - PHYSICAL EXAMINATION
Vital Signs: I have reviewed the initial vital signs.  Constitutional: NAD, well-nourished, appears stated age, no acute distress.  HEENT: Airway patent, moist MM, no erythema/swelling/deformity of oral structures. EOMI, PERRLA.  CV: regular rate, regular rhythm, well-perfused extremities, 2+ b/l DP and radial pulses equal.  Lungs: BCTA, no increased WOB.  ABD: NTND, no guarding or rebound, no pulsatile mass, no hernias.   MSK: Neck supple, nontender, nl ROM, no stepoff. Chest nontender. Back nontender in TLS spine or to b/l bony structures or flanks. Ext nontender, nl rom, no deformity.   INTEG: Skin warm, dry, no rash.  NEURO: Alert, moving all extremities, normal speech  PSYCH: Calm, cooperative, normal affect and interaction.

## 2019-06-17 NOTE — ED PROVIDER NOTE - ATTENDING CONTRIBUTION TO CARE
89f w a hx of HTN, HLD, prior TIA, dementia & isolated seizure but not on anticonvulsants. Pt BIB EMS after brief episode of LOC. Family reports that patient had a single episode of vomiting after eating dinner tonight and right after vomiting had decreased responsiveness that lasted a few minutes. patient back to baseline on ED arrival. No fall, no trauma, no tongue bite, no incontinence. Patient in usual state of health prior. No recent fever/chills, no URI symptoms, no cough, no dyspnea, no black/bloody stools, no dysuria/hematuria, no reports of pain. Pt unable to provide hx due to dementia, hx obtained from family at bedside.    I am unable to obtain a comprehensive history, review of systems, past medical history, and/or physical exam due to constraints imposed by the urgency of the patient's clinical condition and/or mental status..    Review of Systems  Unable to assess due to dementia.    Physical Exam  General: Awake, alert, NAD, elderly/frail, non-toxic appearing, NCAT  Eyes: PERRL, EOMI, no icterus/nystagmus, lids and conjunctivae are normal  ENT: External inspection normal, pink/moist membranes, pharynx normal  CV: S1S2, regular rate and rhythm, no murmur/gallops/rubs, no JVD, 2+ pulses b/l, no edema/cords/homans, warm/well-perfused  Respiratory: Normal respiratory rate/effort, no respiratory distress, normal voice, speaking full sentences, lungs clear to auscultation b/l, no wheezing/rales/rhonchi, no retractions, no stridor  Abdomen: Soft abdomen, no tender/distended/guarding/rebound, no CVA tender  Musculoskeletal: FROM all 4 extremities, N/V intact  Neck: FROM neck, supple, no meningismus, trachea midline, no JVD  Integumentary: Color normal for race, warm and dry, no rash  Neuro: Alert/interactive, CN 2-12 intact, normal motor, normal sensory, normal cerebellar    89f w episode of vomiting and then brief episode of nonresponsiveness. no fall, no trauma. nontoxic appearing, n/v intact. prior hx of TIA & single seizure episode; no witnessed seizure today, nonfocal neuro. --Labs, EKG, CXR, CT head, IV fluids, Analgesia/antiemetics as needed, observe/re-assess.

## 2019-06-17 NOTE — ED PROVIDER NOTE - NSFOLLOWUPINSTRUCTIONS_ED_ALL_ED_FT
Return to the ER for worsening or concerning symptoms.    See printed discharge information sheets for further instructions on care for your condition.    Syncope  Syncope refers to a condition in which a person temporarily loses consciousness. Syncope may also be called fainting or passing out. It is caused by a sudden decrease in blood flow to the brain. Even though most causes of syncope are not dangerous, syncope can be a sign of a serious medical problem. Your health care provider may do tests to find the reason why you are having syncope.    Signs that you may be about to faint include:  Feeling dizzy or light-headed.  Feeling nauseous.  Seeing all white or all black in your field of vision.  Having cold, clammy skin.  If you faint, get medical help right away. Call your local emergency services (911 in the [place] Do not drive yourself to the hospital.    Follow these instructions at home:  Pay attention to any changes in your symptoms. Take these actions to stay safe and to help relieve your symptoms:    Lifestyle     Do not drive, use machinery, or play sports until your health care provider says it is okay.  Do not drink alcohol.  Do not use any products that contain nicotine or tobacco, such as cigarettes and e-cigarettes. If you need help quitting, ask your health care provider.  Drink enough fluid to keep your urine pale yellow.  General instructions     Take over-the-counter and prescription medicines only as told by your health care provider.  If you are taking blood pressure or heart medicine, get up slowly and take several minutes to sit and then stand. This can reduce dizziness or light-headedness.  Have someone stay with you until you feel stable.  If you start to feel like you might faint, lie down right away and raise (elevate) your feet above the level of your heart. Breathe deeply and steadily. Wait until all the symptoms have passed.  Keep all follow-up visits as told by your health care provider. This is important.  Get help right away if you:  Have a severe headache.  Faint once or repeatedly.  Have pain in your chest, abdomen, or back.  Have a very fast or irregular heartbeat (palpitations).  Have pain when you breathe.  Are bleeding from your mouth or rectum, or you have black or tarry stool.  Have a seizure.  Are confused.  Have trouble walking.  Have severe weakness.  Have vision problems.  These symptoms may represent a serious problem that is an emergency. Do not wait to see if your symptoms will go away. Get medical help right away. Call your local emergency services (911 in the [place] Do not drive yourself to the hospital.     Summary  Syncope refers to a condition in which a person temporarily loses consciousness. It is caused by a sudden decrease in blood flow to the brain.  Signs that you may be about to faint include dizziness, feeling light-headed, feeling nauseous, sudden vision changes, or cold, clammy skin.  Although most causes of syncope are not dangerous, syncope can be a sign of a serious medical problem. If you faint, get medical help right away.  This information is not intended to replace advice given to you by your health care provider. Make sure you discuss any questions you have with your health care provider.

## 2019-06-18 VITALS
TEMPERATURE: 98 F | OXYGEN SATURATION: 97 % | DIASTOLIC BLOOD PRESSURE: 68 MMHG | RESPIRATION RATE: 20 BRPM | HEART RATE: 68 BPM | SYSTOLIC BLOOD PRESSURE: 126 MMHG

## 2019-06-18 LAB
ALBUMIN SERPL ELPH-MCNC: 3.5 G/DL — SIGNIFICANT CHANGE UP (ref 3.5–5.2)
ALP SERPL-CCNC: 86 U/L — SIGNIFICANT CHANGE UP (ref 30–115)
ALT FLD-CCNC: 9 U/L — SIGNIFICANT CHANGE UP (ref 0–41)
ANION GAP SERPL CALC-SCNC: 15 MMOL/L — HIGH (ref 7–14)
APTT BLD: 28.8 SEC — SIGNIFICANT CHANGE UP (ref 27–39.2)
AST SERPL-CCNC: 15 U/L — SIGNIFICANT CHANGE UP (ref 0–41)
BASE EXCESS BLDV CALC-SCNC: 5.5 MMOL/L — HIGH (ref -2–2)
BILIRUB SERPL-MCNC: 0.6 MG/DL — SIGNIFICANT CHANGE UP (ref 0.2–1.2)
BUN SERPL-MCNC: 28 MG/DL — HIGH (ref 10–20)
CA-I SERPL-SCNC: 1.19 MMOL/L — SIGNIFICANT CHANGE UP (ref 1.12–1.3)
CALCIUM SERPL-MCNC: 8.9 MG/DL — SIGNIFICANT CHANGE UP (ref 8.5–10.1)
CHLORIDE SERPL-SCNC: 98 MMOL/L — SIGNIFICANT CHANGE UP (ref 98–110)
CO2 SERPL-SCNC: 27 MMOL/L — SIGNIFICANT CHANGE UP (ref 17–32)
CREAT SERPL-MCNC: 1.3 MG/DL — SIGNIFICANT CHANGE UP (ref 0.7–1.5)
GAS PNL BLDV: 139 MMOL/L — SIGNIFICANT CHANGE UP (ref 136–145)
GAS PNL BLDV: SIGNIFICANT CHANGE UP
GLUCOSE SERPL-MCNC: 141 MG/DL — HIGH (ref 70–99)
HCO3 BLDV-SCNC: 32 MMOL/L — HIGH (ref 22–29)
HCT VFR BLDA CALC: 48.4 % — HIGH (ref 34–44)
HGB BLD CALC-MCNC: 15.8 G/DL — SIGNIFICANT CHANGE UP (ref 14–18)
INR BLD: 1.14 RATIO — SIGNIFICANT CHANGE UP (ref 0.65–1.3)
LACTATE BLDV-MCNC: 2.8 MMOL/L — HIGH (ref 0.5–1.6)
LIDOCAIN IGE QN: 86 U/L — HIGH (ref 7–60)
MAGNESIUM SERPL-MCNC: 1.8 MG/DL — SIGNIFICANT CHANGE UP (ref 1.8–2.4)
NT-PROBNP SERPL-SCNC: 627 PG/ML — HIGH (ref 0–300)
PCO2 BLDV: 50 MMHG — SIGNIFICANT CHANGE UP (ref 41–51)
PH BLDV: 7.41 — SIGNIFICANT CHANGE UP (ref 7.26–7.43)
PO2 BLDV: 23 MMHG — SIGNIFICANT CHANGE UP (ref 20–40)
POTASSIUM BLDV-SCNC: 3.1 MMOL/L — LOW (ref 3.3–5.6)
POTASSIUM SERPL-MCNC: 3.1 MMOL/L — LOW (ref 3.5–5)
POTASSIUM SERPL-SCNC: 3.1 MMOL/L — LOW (ref 3.5–5)
PROT SERPL-MCNC: 6.4 G/DL — SIGNIFICANT CHANGE UP (ref 6–8)
PROTHROM AB SERPL-ACNC: 13.1 SEC — HIGH (ref 9.95–12.87)
SAO2 % BLDV: 36 % — SIGNIFICANT CHANGE UP
SODIUM SERPL-SCNC: 140 MMOL/L — SIGNIFICANT CHANGE UP (ref 135–146)
TROPONIN T SERPL-MCNC: <0.01 NG/ML — SIGNIFICANT CHANGE UP

## 2019-06-18 PROCEDURE — 70450 CT HEAD/BRAIN W/O DYE: CPT | Mod: 26

## 2019-06-18 PROCEDURE — 71045 X-RAY EXAM CHEST 1 VIEW: CPT | Mod: 26

## 2019-06-20 ENCOUNTER — INPATIENT (INPATIENT)
Facility: HOSPITAL | Age: 84
LOS: 12 days | Discharge: NURSING, REHAB OR LONG-TERM CARE | End: 2019-07-03
Attending: HOSPITALIST | Admitting: HOSPITALIST
Payer: MEDICARE

## 2019-06-20 VITALS
RESPIRATION RATE: 18 BRPM | HEART RATE: 80 BPM | DIASTOLIC BLOOD PRESSURE: 68 MMHG | WEIGHT: 113.98 LBS | OXYGEN SATURATION: 99 % | SYSTOLIC BLOOD PRESSURE: 140 MMHG | HEIGHT: 61 IN | TEMPERATURE: 98 F
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
## 2019-06-20 PROCEDURE — 99285 EMERGENCY DEPT VISIT HI MDM: CPT | Mod: 25

## 2019-06-21 LAB
ALBUMIN SERPL ELPH-MCNC: 3.6 G/DL — SIGNIFICANT CHANGE UP (ref 3.5–5.2)
ALP SERPL-CCNC: 87 U/L — SIGNIFICANT CHANGE UP (ref 30–115)
ALT FLD-CCNC: 15 U/L — SIGNIFICANT CHANGE UP (ref 0–41)
ANION GAP SERPL CALC-SCNC: 12 MMOL/L — SIGNIFICANT CHANGE UP (ref 7–14)
ANION GAP SERPL CALC-SCNC: 13 MMOL/L — SIGNIFICANT CHANGE UP (ref 7–14)
APPEARANCE UR: ABNORMAL
APTT BLD: 28.6 SEC — SIGNIFICANT CHANGE UP (ref 27–39.2)
AST SERPL-CCNC: 29 U/L — SIGNIFICANT CHANGE UP (ref 0–41)
BASOPHILS # BLD AUTO: 0.04 K/UL — SIGNIFICANT CHANGE UP (ref 0–0.2)
BASOPHILS # BLD AUTO: 0.04 K/UL — SIGNIFICANT CHANGE UP (ref 0–0.2)
BASOPHILS NFR BLD AUTO: 0.2 % — SIGNIFICANT CHANGE UP (ref 0–1)
BASOPHILS NFR BLD AUTO: 0.4 % — SIGNIFICANT CHANGE UP (ref 0–1)
BILIRUB SERPL-MCNC: 0.6 MG/DL — SIGNIFICANT CHANGE UP (ref 0.2–1.2)
BILIRUB UR-MCNC: NEGATIVE — SIGNIFICANT CHANGE UP
BLD GP AB SCN SERPL QL: SIGNIFICANT CHANGE UP
BUN SERPL-MCNC: 20 MG/DL — SIGNIFICANT CHANGE UP (ref 10–20)
BUN SERPL-MCNC: 27 MG/DL — HIGH (ref 10–20)
CALCIUM SERPL-MCNC: 7.9 MG/DL — LOW (ref 8.5–10.1)
CALCIUM SERPL-MCNC: 9.3 MG/DL — SIGNIFICANT CHANGE UP (ref 8.5–10.1)
CHLORIDE SERPL-SCNC: 100 MMOL/L — SIGNIFICANT CHANGE UP (ref 98–110)
CHLORIDE SERPL-SCNC: 101 MMOL/L — SIGNIFICANT CHANGE UP (ref 98–110)
CO2 SERPL-SCNC: 27 MMOL/L — SIGNIFICANT CHANGE UP (ref 17–32)
CO2 SERPL-SCNC: 27 MMOL/L — SIGNIFICANT CHANGE UP (ref 17–32)
COLOR SPEC: YELLOW — SIGNIFICANT CHANGE UP
CREAT SERPL-MCNC: 1 MG/DL — SIGNIFICANT CHANGE UP (ref 0.7–1.5)
CREAT SERPL-MCNC: 1.3 MG/DL — SIGNIFICANT CHANGE UP (ref 0.7–1.5)
DIFF PNL FLD: ABNORMAL
EOSINOPHIL # BLD AUTO: 0.04 K/UL — SIGNIFICANT CHANGE UP (ref 0–0.7)
EOSINOPHIL # BLD AUTO: 0.16 K/UL — SIGNIFICANT CHANGE UP (ref 0–0.7)
EOSINOPHIL NFR BLD AUTO: 0.2 % — SIGNIFICANT CHANGE UP (ref 0–8)
EOSINOPHIL NFR BLD AUTO: 1.5 % — SIGNIFICANT CHANGE UP (ref 0–8)
ETHANOL SERPL-MCNC: <10 MG/DL — SIGNIFICANT CHANGE UP
GLUCOSE SERPL-MCNC: 136 MG/DL — HIGH (ref 70–99)
GLUCOSE SERPL-MCNC: 99 MG/DL — SIGNIFICANT CHANGE UP (ref 70–99)
GLUCOSE UR QL: NEGATIVE MG/DL — SIGNIFICANT CHANGE UP
HCT VFR BLD CALC: 27.2 % — LOW (ref 37–47)
HCT VFR BLD CALC: 27.5 % — LOW (ref 37–47)
HCT VFR BLD CALC: 35.4 % — LOW (ref 37–47)
HGB BLD-MCNC: 11.7 G/DL — LOW (ref 12–16)
HGB BLD-MCNC: 9 G/DL — LOW (ref 12–16)
HGB BLD-MCNC: 9.4 G/DL — LOW (ref 12–16)
IMM GRANULOCYTES NFR BLD AUTO: 0.4 % — HIGH (ref 0.1–0.3)
IMM GRANULOCYTES NFR BLD AUTO: 1.1 % — HIGH (ref 0.1–0.3)
INR BLD: 1.1 RATIO — SIGNIFICANT CHANGE UP (ref 0.65–1.3)
KETONES UR-MCNC: NEGATIVE — SIGNIFICANT CHANGE UP
LACTATE SERPL-SCNC: 2.1 MMOL/L — SIGNIFICANT CHANGE UP (ref 0.5–2.2)
LEUKOCYTE ESTERASE UR-ACNC: ABNORMAL
LIDOCAIN IGE QN: 27 U/L — SIGNIFICANT CHANGE UP (ref 7–60)
LYMPHOCYTES # BLD AUTO: 1.31 K/UL — SIGNIFICANT CHANGE UP (ref 1.2–3.4)
LYMPHOCYTES # BLD AUTO: 1.92 K/UL — SIGNIFICANT CHANGE UP (ref 1.2–3.4)
LYMPHOCYTES # BLD AUTO: 18 % — LOW (ref 20.5–51.1)
LYMPHOCYTES # BLD AUTO: 5.9 % — LOW (ref 20.5–51.1)
MAGNESIUM SERPL-MCNC: 1.8 MG/DL — SIGNIFICANT CHANGE UP (ref 1.8–2.4)
MCHC RBC-ENTMCNC: 31.3 PG — HIGH (ref 27–31)
MCHC RBC-ENTMCNC: 31.5 PG — HIGH (ref 27–31)
MCHC RBC-ENTMCNC: 32.2 PG — HIGH (ref 27–31)
MCHC RBC-ENTMCNC: 33.1 G/DL — SIGNIFICANT CHANGE UP (ref 32–37)
MCHC RBC-ENTMCNC: 33.1 G/DL — SIGNIFICANT CHANGE UP (ref 32–37)
MCHC RBC-ENTMCNC: 34.2 G/DL — SIGNIFICANT CHANGE UP (ref 32–37)
MCV RBC AUTO: 94.2 FL — SIGNIFICANT CHANGE UP (ref 81–99)
MCV RBC AUTO: 94.4 FL — SIGNIFICANT CHANGE UP (ref 81–99)
MCV RBC AUTO: 95.2 FL — SIGNIFICANT CHANGE UP (ref 81–99)
MONOCYTES # BLD AUTO: 0.72 K/UL — HIGH (ref 0.1–0.6)
MONOCYTES # BLD AUTO: 1.03 K/UL — HIGH (ref 0.1–0.6)
MONOCYTES NFR BLD AUTO: 4.6 % — SIGNIFICANT CHANGE UP (ref 1.7–9.3)
MONOCYTES NFR BLD AUTO: 6.8 % — SIGNIFICANT CHANGE UP (ref 1.7–9.3)
NEUTROPHILS # BLD AUTO: 19.57 K/UL — HIGH (ref 1.4–6.5)
NEUTROPHILS # BLD AUTO: 7.76 K/UL — HIGH (ref 1.4–6.5)
NEUTROPHILS NFR BLD AUTO: 72.9 % — SIGNIFICANT CHANGE UP (ref 42.2–75.2)
NEUTROPHILS NFR BLD AUTO: 88 % — HIGH (ref 42.2–75.2)
NITRITE UR-MCNC: NEGATIVE — SIGNIFICANT CHANGE UP
NRBC # BLD: 0 /100 WBCS — SIGNIFICANT CHANGE UP (ref 0–0)
PH UR: 6 — SIGNIFICANT CHANGE UP (ref 5–8)
PHOSPHATE SERPL-MCNC: 2.8 MG/DL — SIGNIFICANT CHANGE UP (ref 2.1–4.9)
PLATELET # BLD AUTO: 101 K/UL — LOW (ref 130–400)
PLATELET # BLD AUTO: 105 K/UL — LOW (ref 130–400)
PLATELET # BLD AUTO: 143 K/UL — SIGNIFICANT CHANGE UP (ref 130–400)
POTASSIUM SERPL-MCNC: 3.2 MMOL/L — LOW (ref 3.5–5)
POTASSIUM SERPL-MCNC: 3.8 MMOL/L — SIGNIFICANT CHANGE UP (ref 3.5–5)
POTASSIUM SERPL-SCNC: 3.2 MMOL/L — LOW (ref 3.5–5)
POTASSIUM SERPL-SCNC: 3.8 MMOL/L — SIGNIFICANT CHANGE UP (ref 3.5–5)
PROT SERPL-MCNC: 6.8 G/DL — SIGNIFICANT CHANGE UP (ref 6–8)
PROT UR-MCNC: ABNORMAL MG/DL
PROTHROM AB SERPL-ACNC: 12.6 SEC — SIGNIFICANT CHANGE UP (ref 9.95–12.87)
RBC # BLD: 2.88 M/UL — LOW (ref 4.2–5.4)
RBC # BLD: 2.92 M/UL — LOW (ref 4.2–5.4)
RBC # BLD: 3.72 M/UL — LOW (ref 4.2–5.4)
RBC # FLD: 12.8 % — SIGNIFICANT CHANGE UP (ref 11.5–14.5)
RBC # FLD: 13.1 % — SIGNIFICANT CHANGE UP (ref 11.5–14.5)
RBC # FLD: 13.1 % — SIGNIFICANT CHANGE UP (ref 11.5–14.5)
RBC CASTS # UR COMP ASSIST: ABNORMAL /HPF
SODIUM SERPL-SCNC: 140 MMOL/L — SIGNIFICANT CHANGE UP (ref 135–146)
SODIUM SERPL-SCNC: 140 MMOL/L — SIGNIFICANT CHANGE UP (ref 135–146)
SP GR SPEC: 1.01 — SIGNIFICANT CHANGE UP (ref 1.01–1.03)
UROBILINOGEN FLD QL: 0.2 MG/DL — SIGNIFICANT CHANGE UP (ref 0.2–0.2)
WBC # BLD: 10.64 K/UL — SIGNIFICANT CHANGE UP (ref 4.8–10.8)
WBC # BLD: 22.24 K/UL — HIGH (ref 4.8–10.8)
WBC # BLD: 9.84 K/UL — SIGNIFICANT CHANGE UP (ref 4.8–10.8)
WBC # FLD AUTO: 10.64 K/UL — SIGNIFICANT CHANGE UP (ref 4.8–10.8)
WBC # FLD AUTO: 22.24 K/UL — HIGH (ref 4.8–10.8)
WBC # FLD AUTO: 9.84 K/UL — SIGNIFICANT CHANGE UP (ref 4.8–10.8)
WBC UR QL: SIGNIFICANT CHANGE UP /HPF

## 2019-06-21 PROCEDURE — 73502 X-RAY EXAM HIP UNI 2-3 VIEWS: CPT | Mod: 26,LT

## 2019-06-21 PROCEDURE — 99283 EMERGENCY DEPT VISIT LOW MDM: CPT

## 2019-06-21 PROCEDURE — 73110 X-RAY EXAM OF WRIST: CPT | Mod: 26,LT

## 2019-06-21 PROCEDURE — 72125 CT NECK SPINE W/O DYE: CPT | Mod: 26

## 2019-06-21 PROCEDURE — 93010 ELECTROCARDIOGRAM REPORT: CPT

## 2019-06-21 PROCEDURE — 71260 CT THORAX DX C+: CPT | Mod: 26

## 2019-06-21 PROCEDURE — 73110 X-RAY EXAM OF WRIST: CPT | Mod: 26,LT,77

## 2019-06-21 PROCEDURE — 71045 X-RAY EXAM CHEST 1 VIEW: CPT | Mod: 26

## 2019-06-21 PROCEDURE — 74177 CT ABD & PELVIS W/CONTRAST: CPT | Mod: 26

## 2019-06-21 PROCEDURE — 73090 X-RAY EXAM OF FOREARM: CPT | Mod: 26,LT

## 2019-06-21 PROCEDURE — 70450 CT HEAD/BRAIN W/O DYE: CPT | Mod: 26

## 2019-06-21 RX ORDER — ATORVASTATIN CALCIUM 80 MG/1
20 TABLET, FILM COATED ORAL AT BEDTIME
Refills: 0 | Status: DISCONTINUED | OUTPATIENT
Start: 2019-06-21 | End: 2019-07-03

## 2019-06-21 RX ORDER — LOSARTAN POTASSIUM 100 MG/1
25 TABLET, FILM COATED ORAL DAILY
Refills: 0 | Status: DISCONTINUED | OUTPATIENT
Start: 2019-06-21 | End: 2019-07-03

## 2019-06-21 RX ORDER — CHLORHEXIDINE GLUCONATE 213 G/1000ML
1 SOLUTION TOPICAL
Refills: 0 | Status: DISCONTINUED | OUTPATIENT
Start: 2019-06-21 | End: 2019-07-03

## 2019-06-21 RX ORDER — HEPARIN SODIUM 5000 [USP'U]/ML
5000 INJECTION INTRAVENOUS; SUBCUTANEOUS EVERY 8 HOURS
Refills: 0 | Status: DISCONTINUED | OUTPATIENT
Start: 2019-06-21 | End: 2019-06-29

## 2019-06-21 RX ORDER — ACETAMINOPHEN 500 MG
650 TABLET ORAL EVERY 6 HOURS
Refills: 0 | Status: DISCONTINUED | OUTPATIENT
Start: 2019-06-21 | End: 2019-07-03

## 2019-06-21 RX ORDER — PANTOPRAZOLE SODIUM 20 MG/1
40 TABLET, DELAYED RELEASE ORAL
Refills: 0 | Status: DISCONTINUED | OUTPATIENT
Start: 2019-06-21 | End: 2019-07-03

## 2019-06-21 RX ORDER — SODIUM CHLORIDE 9 MG/ML
1000 INJECTION INTRAMUSCULAR; INTRAVENOUS; SUBCUTANEOUS
Refills: 0 | Status: DISCONTINUED | OUTPATIENT
Start: 2019-06-21 | End: 2019-06-21

## 2019-06-21 RX ORDER — MEMANTINE HYDROCHLORIDE 10 MG/1
10 TABLET ORAL
Refills: 0 | Status: DISCONTINUED | OUTPATIENT
Start: 2019-06-21 | End: 2019-07-03

## 2019-06-21 RX ORDER — DONEPEZIL HYDROCHLORIDE 10 MG/1
10 TABLET, FILM COATED ORAL AT BEDTIME
Refills: 0 | Status: DISCONTINUED | OUTPATIENT
Start: 2019-06-21 | End: 2019-07-03

## 2019-06-21 RX ORDER — ASPIRIN/CALCIUM CARB/MAGNESIUM 324 MG
81 TABLET ORAL DAILY
Refills: 0 | Status: DISCONTINUED | OUTPATIENT
Start: 2019-06-21 | End: 2019-07-03

## 2019-06-21 RX ADMIN — SODIUM CHLORIDE 50 MILLILITER(S): 9 INJECTION INTRAMUSCULAR; INTRAVENOUS; SUBCUTANEOUS at 14:34

## 2019-06-21 RX ADMIN — Medication 650 MILLIGRAM(S): at 23:46

## 2019-06-21 RX ADMIN — MEMANTINE HYDROCHLORIDE 10 MILLIGRAM(S): 10 TABLET ORAL at 17:01

## 2019-06-21 RX ADMIN — DONEPEZIL HYDROCHLORIDE 10 MILLIGRAM(S): 10 TABLET, FILM COATED ORAL at 21:45

## 2019-06-21 RX ADMIN — Medication 0.5 MILLIGRAM(S): at 21:44

## 2019-06-21 RX ADMIN — HEPARIN SODIUM 5000 UNIT(S): 5000 INJECTION INTRAVENOUS; SUBCUTANEOUS at 13:01

## 2019-06-21 RX ADMIN — CHLORHEXIDINE GLUCONATE 1 APPLICATION(S): 213 SOLUTION TOPICAL at 05:56

## 2019-06-21 RX ADMIN — Medication 81 MILLIGRAM(S): at 17:03

## 2019-06-21 RX ADMIN — ATORVASTATIN CALCIUM 20 MILLIGRAM(S): 80 TABLET, FILM COATED ORAL at 21:44

## 2019-06-21 RX ADMIN — HEPARIN SODIUM 5000 UNIT(S): 5000 INJECTION INTRAVENOUS; SUBCUTANEOUS at 06:52

## 2019-06-21 RX ADMIN — Medication 650 MILLIGRAM(S): at 17:02

## 2019-06-21 RX ADMIN — HEPARIN SODIUM 5000 UNIT(S): 5000 INJECTION INTRAVENOUS; SUBCUTANEOUS at 21:44

## 2019-06-21 NOTE — CONSULT NOTE ADULT - ASSESSMENT
IMPRESSION: Rehab of left distal radius fx and left pubic ramus fx    PRECAUTIONS: [  ] Cardiac  [  ] Respiratory  [  ] Seizures [  ] Contact Isolation  [  ] Droplet Isolation  [  ] Other    Weight Bearing Status:    PWB LLE; NWB LUE; OK to amb. with left platform walker, dementia    RECOMMENDATION:    Out of Bed to Chair     DVT/Decubiti Prophylaxis    REHAB PLAN:     [ xx  ] Bedside P/T 3-5 times a week   [   ]   Bedside O/T  2-3 times a week             [   ] No Rehab Therapy Indicated                   [   ]  Speech Therapy   Conditioning/ROM                                    ADL  Bed Mobility                                               Conditioning/ROM  Transfers                                                     Bed Mobility  Sitting /Standing Balance                         Transfers                                        Gait Training                                               Sitting/Standing Balance  Stair Training [   ]Applicable                    Home equipment Eval                                                                        Splinting  [   ] Only      GOALS:   ADL   [ x  ]   Independent                    Transfers  [  x ] Independent                          Ambulation  [ x  ] Independent     [    ] With device                            [ x  ]  CG                                                         [ x  ]  CG                                                                  [ x  ] CG                            [    ] Min A                                                   [   ] Min A                                                              [   ] Min  A          DISCHARGE PLAN:   [   ]  Good candidate for Intensive Rehabilitation/Hospital based-[facility]                                             Will tolerate 3hrs Intensive Rehab Daily                                       [  xx  ]  Short Term Rehab in Skilled Nursing Facility                                       [    ]  Home with Outpatient or VN services                                         [    ]  Possible Candidate for Intensive Hospital based Rehab

## 2019-06-21 NOTE — CONSULT NOTE ADULT - SUBJECTIVE AND OBJECTIVE BOX
HPI:  89F w/ PMHx of  HTN, dementia presenting after trip & fall onto left side. Family witnessed fall forward and states patient caught herself on her hands. - HT, -  LOC, - AC. c/o  left hand pain and left hip pain. Left hip pain worse with knee or hip flexion or weight bearing. (2019 03:19)      PAST MEDICAL & SURGICAL HISTORY:  Hyperlipidemia  Hypertension  Constipation  TIA (transient ischemic attack)  Urinary retention  No significant past surgical history      Hospital Course:  She is s/p placement in a left short arm splint for the left wrist fx. NWB LUE.  PWB LLE.  she has dementia.  Her baseline appears to be walking with a standard walker with cg assist. she had some trouble advancing the walker at baseline.  TODAY'S SUBJECTIVE & REVIEW OF SYMPTOMS:     Constitutional WNL   Cardio WNL   Resp WNL   GI WNL  Heme WNL  Endo WNL  Skin WNL  MSK WNL  Neuro WNL  Cognitive WNL  Psych WNL      MEDICATIONS  (STANDING):  acetaminophen   Tablet .. 650 milliGRAM(s) Oral every 6 hours  aspirin  chewable 81 milliGRAM(s) Oral daily  atorvastatin 20 milliGRAM(s) Oral at bedtime  chlorhexidine 4% Liquid 1 Application(s) Topical <User Schedule>  donepezil 10 milliGRAM(s) Oral at bedtime  heparin  Injectable 5000 Unit(s) SubCutaneous every 8 hours  losartan 25 milliGRAM(s) Oral daily  memantine 10 milliGRAM(s) Oral two times a day  pantoprazole    Tablet 40 milliGRAM(s) Oral before breakfast    MEDICATIONS  (PRN):  LORazepam     Tablet 0.5 milliGRAM(s) Oral three times a day PRN Anxiety      FAMILY HISTORY:  No pertinent family history in first degree relatives      Allergies    No Known Allergies    Intolerances        SOCIAL HISTORY:    [  ] Etoh  [  ] Smoking  [  ] Substance abuse     Home Environment:  [  ] Home Alone  [ x ] Lives with Family-dtr  [  ] Home Health Aid    Dwelling:  [  ] Apartment  [  ] Private House  [  ] Adult Home  [  ] Skilled Nursing Facility      [  ] Short Term  [  ] Long Term  [  ] Stairs       Elevator [  ]    FUNCTIONAL STATUS PTA: (Check all that apply)  Ambulation: [   ]Independent    [  ] Dependent     [  ] Non-Ambulatory  Assistive Device: [  ] SA Cane  [  ]  Q Cane  [x  ] Walker with cg assist [  ]  Wheelchair  ADL : [  ] Independent  [  ]  Dependent       Vital Signs Last 24 Hrs  T(C): 37.2 (2019 15:46), Max: 37.2 (2019 15:46)  T(F): 98.9 (2019 15:46), Max: 98.9 (2019 15:46)  HR: 89 (2019 15:46) (76 - 89)  BP: 117/55 (2019 15:46) (117/55 - 140/68)  BP(mean): --  RR: 18 (2019 15:46) (18 - 18)  SpO2: 98% (2019 15:46) (96% - 99%)      PHYSICAL EXAM: Alert & Oriented X1  GENERAL: NAD, well-groomed, well-developed  HEAD:  Atraumatic, Normocephalic  EYES: EOMI, PERRLA, conjunctiva and sclera clear  NECK: Supple, No JVD, Normal thyroid  CHEST/LUNG: Clear to percussion bilaterally; No rales, rhonchi, wheezing, or rubs  HEART: Regular rate and rhythm; No murmurs, rubs, or gallops  ABDOMEN: Soft, Nontender, Nondistended; Bowel sounds present  EXTREMITIES:  2+ Peripheral Pulses, No clubbing, cyanosis, or edema    NERVOUS SYSTEM:  Cranial Nerves 2-12 intact [  ] Abnormal  [  ]  ROM: WFL all extremities [  ]  Abnormal [  ]  Motor Strength: WFL all extremities  [  ]  Abnormal [  ]  LLE with pain and weakness  Sensation: intact to light touch [  ] Abnormal [  ]  Reflexes: Symmetric [  ]  Abnormal [  ]    FUNCTIONAL STATUS:  Bed Mobility: Independent [  ]  Supervision [  ]  Needs Assistance [  ]  N/A [  ]  Transfers: Independent [  ]  Supervision [  ]  Needs Assistance [  ]  N/A [  ]   Ambulation: Independent [  ]  Supervision [  ]  Needs Assistance [  ]  N/A [  ]  ADL: Independent [  ] Requires Assistance [  ] N/A [  ]      LABS:                        11.7   22.24 )-----------( 143      ( 2019 23:50 )             35.4     06-20    140  |  100  |  20  ----------------------------<  136<H>  3.8   |  27  |  1.0    Ca    9.3      2019 23:50    TPro  6.8  /  Alb  3.6  /  TBili  0.6  /  DBili  x   /  AST  29  /  ALT  15  /  AlkPhos  87  06-20    PT/INR - ( 2019 23:50 )   PT: 12.60 sec;   INR: 1.10 ratio         PTT - ( 2019 23:50 )  PTT:28.6 sec  Urinalysis Basic - ( 2019 01:30 )    Color: Yellow / Appearance: Cloudy / S.010 / pH: x  Gluc: x / Ketone: Negative  / Bili: Negative / Urobili: 0.2 mg/dL   Blood: x / Protein: Trace mg/dL / Nitrite: Negative   Leuk Esterase: Trace / RBC: 6-10 /HPF / WBC 3-5 /HPF   Sq Epi: x / Non Sq Epi: x / Bacteria: x        RADIOLOGY & ADDITIONAL STUDIES:    Assesment:

## 2019-06-21 NOTE — ED PROVIDER NOTE - PHYSICAL EXAMINATION
Physical Exam    Vital Signs: I have reviewed the initial vital signs  Constitutional: well-nourished, appears stated age, no acute distress  EENT: Conjunctiva pink, Sclera clear, PERRLA, EOMI.  Cardiovascular: S1 and S2 present, regular rate, regular rhythm. Well perfused extremities, no peripheral edema  Respiratory: unlabored respiratory effort, clear to auscultation bilaterally no wheezing, rales and rhonchi  Gastrointestinal: soft, non-tender abdomen  Musculoskeletal: supple nontender neck, midline tenderness in L spine. TTP in left wrist with ecchymosis on dorsal aspect of hand. left elbow and hip TTP with no overlying skin changes. DP 2 + b/l  Integumentary: warm, dry, no rash  Psychiatric: appropriate mood, appropriate affect

## 2019-06-21 NOTE — ED PROVIDER NOTE - CLINICAL SUMMARY MEDICAL DECISION MAKING FREE TEXT BOX
Pt presented after a fall. Was found to have pubic rami and distal radial fx. WIll admit to trauma team for further management.

## 2019-06-21 NOTE — H&P ADULT - HISTORY OF PRESENT ILLNESS
89F w/ PMHx of  HTN, dementia presenting after trip & fall onto left side. Family witnessed fall forward and states patient caught herself on her hands. - HT, -  LOC, - AC. c/o  left hand pain and left hip pain. Left hip pain worse with knee or hip flexion or weight bearing.

## 2019-06-21 NOTE — ED PROVIDER NOTE - NS ED ROS FT
Review of Systems         Constitutional: (-) fever (-) chills (-) weakness       Head: (-) trauma       EENT:  (-) sore throat       Cardiovascular: (-) chest pain (-) syncope       Respiratory: (-) cough, (-) shortness of breath       Gastrointestinal: (-) abdominal pain (-) vomiting (-) diarrhea (-) nausea       Genitourinary: (-) dysuria       Musculoskeletal: (-) neck pain (+) back pain (+) hip and hand elbow pain       Integumentary: (-) rash       Neurological: (-) headache (-) altered mental status (-) dizziness       Psych: (-) psych history

## 2019-06-21 NOTE — ED PROVIDER NOTE - CARE PLAN
Principal Discharge DX:	Pubic ramus fracture  Secondary Diagnosis:	Radius and ulna distal fracture Principal Discharge DX:	Pubic ramus fracture  Secondary Diagnosis:	Radius and ulna distal fracture  Secondary Diagnosis:	Fall, initial encounter

## 2019-06-21 NOTE — H&P ADULT - NSHPLABSRESULTS_GEN_ALL_CORE
LAB/STUDIES:             11.7   22.24 )-----------( 143      ( 2019 23:50 )             35.4         140  |  100  |  20  ----------------------------<  136<H>  3.8   |  27  |  1.0    Ca    9.3      2019 23:50    TPro  6.8  /  Alb  3.6  /  TBili  0.6  /  DBili  x   /  AST  29  /  ALT  15  /  AlkPhos  87      PT/INR - ( 2019 23:50 )   PT: 12.60 sec;   INR: 1.10 ratio      PTT - ( 2019 23:50 )  PTT:28.6 sec  LIVER FUNCTIONS - ( 2019 23:50 )  Alb: 3.6 g/dL / Pro: 6.8 g/dL / ALK PHOS: 87 U/L / ALT: 15 U/L / AST: 29 U/L / GGT: x           Urinalysis Basic - ( 2019 01:30 )    Color: Yellow / Appearance: Cloudy / S.010 / pH: x  Gluc: x / Ketone: Negative  / Bili: Negative / Urobili: 0.2 mg/dL   Blood: x / Protein: Trace mg/dL / Nitrite: Negative   Leuk Esterase: Trace / RBC: 6-10 /HPF / WBC 3-5 /HPF   Sq Epi: x / Non Sq Epi: x / Bacteria: x    IMAGING:  CT Head No Cont (19 @ 03:02) >  IMPRESSION:   No evidence of intracranial hemorrhage, territorial infarct, or mass   effect. Prominent ventricles out of proportion to the sulcal pattern in the  appropriate clinical setting consistent with communicating hydrocephalus.    CT Cervical Spine No Cont (19 @ 03:04) >  IMPRESSION:  No acute cervical spine fracture or subluxation.    CT Chest w/ IV Cont (19 @ 03:06) >  IMPRESSION:   Acute fractures of the left superior and inferior pubic rami.    CT Abdomen and Pelvis w/ IV Cont (19 @ 03:06) >  IMPRESSION:   Acute fractures of the left superior and inferior pubic rami.

## 2019-06-21 NOTE — ED PROVIDER NOTE - ATTENDING CONTRIBUTION TO CARE
89 yo f hx htn, dementia here for fall to L side. pt then c/o L hip and wrist pain. fall witness by family. pt w/ baseline gait instability and tripped. no loc/n/v. not on ac or antiplt.     vss  gen- NAD, aaox3  card-rrr  lungs-ctab, no wheezing or rhonchi  abd-sntnd, no guarding or rebound  neuro- full str/sensation, cn ii-xii grossly intact, normal coordination and gait  Hip- L hip pain to prox femur  L wrist- ttp to distal radius/ulna    given age, will panscan, wrist/hip/pelvis/chest film  trauma labs, ua  analgesia

## 2019-06-21 NOTE — ED ADULT NURSE NOTE - OBJECTIVE STATEMENT
pt brought in by daughter for witnessed fall to floor. pt daughter states pt fell to floor landing on her left wrist /left hip. pt c.o pain. daughter denies any loc. pt takes baby aspirin everyday. denies any bleeding. pt has dementia at baseline . daughter denies any increased confusion.

## 2019-06-21 NOTE — ED PROVIDER NOTE - OBJECTIVE STATEMENT
89 year old female hx HTN, dementia presenting after fall onto left side. Family witnessed fall forward and states patient caught herself on her hands. No LOC or A/C. She now has left hand pain and left hip pain. Left hip pain worse with knee or hip flexion or weight bearing, no palliation, moderate, constant. unable to ambulate. Patient denies abd pain. chest pain, headache, fever, weakness.

## 2019-06-21 NOTE — CONSULT NOTE ADULT - SUBJECTIVE AND OBJECTIVE BOX
Orthopaedics Consult Note    GARETH CUEVAS  [id number]    Patient is a 89y year old Female with PMH HTN, HLD, TIA, dementia who was brought to the ED after trip and fall. Radiographs revealed left inferior and superior pubic rami fractures and a left distal radius fracture. Patient unable to provide history due to mental status and confusion. Per report she fell from standing height onto her left side and braced herself with her left arm. She has pain in left hip/buttock and left wrist. Patient denies pain anywhere and states that nothing is bothering her.    PMH/PSH    No pertinent family history in first degree relatives  Hyperlipidemia  Hypertension  Constipation  TIA (transient ischemic attack)  Urinary retention  Pubic ramus fracture  Radius and ulna distal fracture      Medications  acetaminophen   Tablet .. 650 milliGRAM(s) Oral every 6 hours  aspirin  chewable 81 milliGRAM(s) Oral daily  atorvastatin 20 milliGRAM(s) Oral at bedtime  chlorhexidine 4% Liquid 1 Application(s) Topical <User Schedule>  donepezil 10 milliGRAM(s) Oral at bedtime  heparin  Injectable 5000 Unit(s) SubCutaneous every 8 hours  LORazepam     Tablet 0.5 milliGRAM(s) Oral three times a day PRN  losartan 25 milliGRAM(s) Oral daily  memantine 10 milliGRAM(s) Oral two times a day  pantoprazole    Tablet 40 milliGRAM(s) Oral before breakfast      Allergies  No Known Allergies        T(C): 36.7 (06-21-19 @ 04:17), Max: 36.8 (06-20-19 @ 22:44)  HR: 80 (06-21-19 @ 04:17) (80 - 80)  BP: 140/62 (06-21-19 @ 04:17) (140/62 - 140/68)  RR: 18 (06-21-19 @ 04:17) (18 - 18)  SpO2: 97% (06-21-19 @ 04:17) (97% - 99%)    Physical Exam  NAD, confused  Breathing comfortably on RA  Resting comfortably    LUE  Mild swelling and dorsal angulation of left wrist  No open wounds  Motor: moves all fingers  Sensory: patient non-compliant  Vasc: hand WWP, 2+ radial pulse    LLE  Mild pain with axial load  No significant shortening or external rotation of the leg  No deformity/laceration/abrasion noted  No swelling/erythema/ecchymosis noted  Motor: TA/EHL/Gastroc intact  Sensory: unable to assess  Vasc: foot WWP, 2+ DP pulse    Labs                        11.7   22.24 )-----------( 143      ( 20 Jun 2019 23:50 )             35.4     06-20    140  |  100  |  20  ----------------------------<  136<H>  3.8   |  27  |  1.0    Ca    9.3      20 Jun 2019 23:50    TPro  6.8  /  Alb  3.6  /  TBili  0.6  /  DBili  x   /  AST  29  /  ALT  15  /  AlkPhos  87  06-20    LIVER FUNCTIONS - ( 20 Jun 2019 23:50 )  Alb: 3.6 g/dL / Pro: 6.8 g/dL / ALK PHOS: 87 U/L / ALT: 15 U/L / AST: 29 U/L / GGT: x           PT/INR - ( 20 Jun 2019 23:50 )   PT: 12.60 sec;   INR: 1.10 ratio         PTT - ( 20 Jun 2019 23:50 )  PTT:28.6 sec    Img  Pelvis, LLE, LUE XR  Dorsally displaced distal radius fracture  Left inferior and superior pubic rami fractures    Pelvis CT  Again seen are left superior and inferior rami fractures, osteopenia, degenerative changes BL hip    Procedure  10cc of 1% lidocaine injected in hematoma block fashion to left wrist  Fracture reduced and placed in sugartong splint  NVI post-splint    A/P: Patient is a 89y year old Female with left distal radius fracture s/p splint; left inferior and superior rami fractures    NWB on LUE  PWB on LLE  DVT ppx: SCD, okay for chemical DVT ppx  Abx: none indicated  PT/OT consult  Further recommendations pending attending review of case Orthopaedics Consult Note    GARETH CUEVAS  [id number]    Patient is a 89y year old Female with PMH HTN, HLD, TIA, dementia who was brought to the ED after trip and fall. Radiographs revealed left inferior and superior pubic rami fractures and a left distal radius fracture. Patient unable to provide history due to mental status and confusion. Per report she fell from standing height onto her left side and braced herself with her left arm. She has pain in left hip/buttock and left wrist. Patient denies pain anywhere and states that nothing is bothering her.    PMH/PSH    No pertinent family history in first degree relatives  Hyperlipidemia  Hypertension  Constipation  TIA (transient ischemic attack)  Urinary retention  Pubic ramus fracture  Radius and ulna distal fracture      Medications  acetaminophen   Tablet .. 650 milliGRAM(s) Oral every 6 hours  aspirin  chewable 81 milliGRAM(s) Oral daily  atorvastatin 20 milliGRAM(s) Oral at bedtime  chlorhexidine 4% Liquid 1 Application(s) Topical <User Schedule>  donepezil 10 milliGRAM(s) Oral at bedtime  heparin  Injectable 5000 Unit(s) SubCutaneous every 8 hours  LORazepam     Tablet 0.5 milliGRAM(s) Oral three times a day PRN  losartan 25 milliGRAM(s) Oral daily  memantine 10 milliGRAM(s) Oral two times a day  pantoprazole    Tablet 40 milliGRAM(s) Oral before breakfast      Allergies  No Known Allergies        T(C): 36.7 (06-21-19 @ 04:17), Max: 36.8 (06-20-19 @ 22:44)  HR: 80 (06-21-19 @ 04:17) (80 - 80)  BP: 140/62 (06-21-19 @ 04:17) (140/62 - 140/68)  RR: 18 (06-21-19 @ 04:17) (18 - 18)  SpO2: 97% (06-21-19 @ 04:17) (97% - 99%)    Physical Exam  NAD, confused  Breathing comfortably on RA  Resting comfortably    LUE  Mild swelling and dorsal angulation of left wrist  No open wounds  Motor: moves all fingers  Sensory: patient non-compliant  Vasc: hand WWP, 2+ radial pulse    LLE  Mild pain with axial load  No significant shortening or external rotation of the leg  No deformity/laceration/abrasion noted  No swelling/erythema/ecchymosis noted  Motor: TA/EHL/Gastroc intact  Sensory: unable to assess  Vasc: foot WWP, 2+ DP pulse    Labs                        11.7   22.24 )-----------( 143      ( 20 Jun 2019 23:50 )             35.4     06-20    140  |  100  |  20  ----------------------------<  136<H>  3.8   |  27  |  1.0    Ca    9.3      20 Jun 2019 23:50    TPro  6.8  /  Alb  3.6  /  TBili  0.6  /  DBili  x   /  AST  29  /  ALT  15  /  AlkPhos  87  06-20    LIVER FUNCTIONS - ( 20 Jun 2019 23:50 )  Alb: 3.6 g/dL / Pro: 6.8 g/dL / ALK PHOS: 87 U/L / ALT: 15 U/L / AST: 29 U/L / GGT: x           PT/INR - ( 20 Jun 2019 23:50 )   PT: 12.60 sec;   INR: 1.10 ratio         PTT - ( 20 Jun 2019 23:50 )  PTT:28.6 sec    Img  Pelvis, LLE, LUE XR  Dorsally displaced distal radius fracture  Left inferior and superior pubic rami fractures    Pelvis CT  Again seen are left superior and inferior rami fractures, osteopenia, degenerative changes BL hip    Procedure  10cc of 1% lidocaine injected in hematoma block fashion to left wrist  Fracture reduced and placed in sugartong splint  NVI post-splint    A/P: Patient is a 89y year old Female with left distal radius fracture s/p splint; left inferior and superior rami fractures    NWB on LUE  PWB on LLE  DVT ppx: SCD, okay for chemical DVT ppx  Abx: none indicated  PT/OT consult  Further recommendations pending attending review of case  pt seen and examined agree with plan  s/w daughter at bedside

## 2019-06-21 NOTE — H&P ADULT - NSICDXPASTMEDICALHX_GEN_ALL_CORE_FT
PAST MEDICAL HISTORY:  Constipation     Hyperlipidemia     Hypertension     TIA (transient ischemic attack)     Urinary retention

## 2019-06-21 NOTE — ED PROVIDER NOTE - PROGRESS NOTE DETAILS
pt signed out to Dr. Mercedes- pending pelvis/hip read, ct results, wrist splint, ua, trial of gait if w/u negative pelvis xray noted, trauma consult placed Sign out accepted from Dr. Cruz

## 2019-06-21 NOTE — H&P ADULT - ATTENDING COMMENTS
89yo female with HTN, dementia presenting as trauma consult after falling onto left side. Sustained left superior and inferior pubic rami fractures, left distal radius fracture. Admit, pain control, Ortho consult.

## 2019-06-21 NOTE — H&P ADULT - ASSESSMENT
89F w/ PMHx of  HTN, dementia presenting after trip & fall onto left side. Family witnessed fall forward and states patient caught herself on her hands. - HT, -  LOC, - AC. c/o  left hand pain and left hip pain. Left hip pain worse with knee or hip flexion or weight bearing. Trauma work up with findings of   1. Acute fractures of the left superior and inferior pubic rami.  2. Lt distal radius fracture.    PLAN:  - Ortho evaluation for Pelvic Fx, & Lt distal radius Fx  - Reg diet, IVL, PPI  - Restart Home medications  - Pain Control, Incentive spirometry.  - PT/Rehab c/s

## 2019-06-21 NOTE — H&P ADULT - NSHPPHYSICALEXAM_GEN_ALL_CORE
Vital Signs Last 24 Hrs  T(C): 36.8 (20 Jun 2019 22:44), Max: 36.8 (20 Jun 2019 22:44)  T(F): 98.3 (20 Jun 2019 22:44), Max: 98.3 (20 Jun 2019 22:44)  HR: 80 (20 Jun 2019 22:44) (80 - 80)  BP: 140/68 (20 Jun 2019 22:44) (140/68 - 140/68)  BP(mean): --  RR: 18 (20 Jun 2019 22:44) (18 - 18)  SpO2: 99% (20 Jun 2019 22:44) (99% - 99%)    PHYSICAL EXAM:  General: NAD  HEENT: Normocephalic, atraumatic, EOMI, PEERLA. no scalp lacerations   Neck: Soft, midline trachea. no c-spine tenderness  Chest: No chest wall tenderness, no subcutaneous emphysema   Cardiac: S1, S2, RRR  Respiratory: Bilateral breath sounds, clear and equal bilaterally  Abdomen: Soft, non-distended, non-tender, no rebound.  Groin: Normal appearing, pelvis stable, left sided pelvic pain  Ext: TTP in left wrist with ecchymosis on dorsal aspect of hand. +rad pulses.  Back: No TTP, No palpable runoff/stepoff/deformity

## 2019-06-22 LAB
ANION GAP SERPL CALC-SCNC: 10 MMOL/L — SIGNIFICANT CHANGE UP (ref 7–14)
ANION GAP SERPL CALC-SCNC: 13 MMOL/L — SIGNIFICANT CHANGE UP (ref 7–14)
ANION GAP SERPL CALC-SCNC: 17 MMOL/L — HIGH (ref 7–14)
BASOPHILS # BLD AUTO: 0.03 K/UL — SIGNIFICANT CHANGE UP (ref 0–0.2)
BASOPHILS NFR BLD AUTO: 0.3 % — SIGNIFICANT CHANGE UP (ref 0–1)
BUN SERPL-MCNC: 29 MG/DL — HIGH (ref 10–20)
BUN SERPL-MCNC: 29 MG/DL — HIGH (ref 10–20)
BUN SERPL-MCNC: 30 MG/DL — HIGH (ref 10–20)
CALCIUM SERPL-MCNC: 7.6 MG/DL — LOW (ref 8.5–10.1)
CALCIUM SERPL-MCNC: 8.3 MG/DL — LOW (ref 8.5–10.1)
CALCIUM SERPL-MCNC: 8.4 MG/DL — LOW (ref 8.5–10.1)
CHLORIDE SERPL-SCNC: 102 MMOL/L — SIGNIFICANT CHANGE UP (ref 98–110)
CHLORIDE SERPL-SCNC: 104 MMOL/L — SIGNIFICANT CHANGE UP (ref 98–110)
CHLORIDE SERPL-SCNC: 99 MMOL/L — SIGNIFICANT CHANGE UP (ref 98–110)
CO2 SERPL-SCNC: 22 MMOL/L — SIGNIFICANT CHANGE UP (ref 17–32)
CO2 SERPL-SCNC: 24 MMOL/L — SIGNIFICANT CHANGE UP (ref 17–32)
CO2 SERPL-SCNC: 25 MMOL/L — SIGNIFICANT CHANGE UP (ref 17–32)
CREAT SERPL-MCNC: 1.1 MG/DL — SIGNIFICANT CHANGE UP (ref 0.7–1.5)
CREAT SERPL-MCNC: 1.2 MG/DL — SIGNIFICANT CHANGE UP (ref 0.7–1.5)
CREAT SERPL-MCNC: 1.2 MG/DL — SIGNIFICANT CHANGE UP (ref 0.7–1.5)
EOSINOPHIL # BLD AUTO: 0.05 K/UL — SIGNIFICANT CHANGE UP (ref 0–0.7)
EOSINOPHIL NFR BLD AUTO: 0.5 % — SIGNIFICANT CHANGE UP (ref 0–8)
GLUCOSE SERPL-MCNC: 111 MG/DL — HIGH (ref 70–99)
GLUCOSE SERPL-MCNC: 132 MG/DL — HIGH (ref 70–99)
GLUCOSE SERPL-MCNC: 148 MG/DL — HIGH (ref 70–99)
HCT VFR BLD CALC: 29.1 % — LOW (ref 37–47)
HCT VFR BLD CALC: 29.8 % — LOW (ref 37–47)
HCT VFR BLD CALC: 30 % — LOW (ref 37–47)
HGB BLD-MCNC: 9.6 G/DL — LOW (ref 12–16)
HGB BLD-MCNC: 9.7 G/DL — LOW (ref 12–16)
HGB BLD-MCNC: 9.8 G/DL — LOW (ref 12–16)
IMM GRANULOCYTES NFR BLD AUTO: 0.4 % — HIGH (ref 0.1–0.3)
LYMPHOCYTES # BLD AUTO: 1.43 K/UL — SIGNIFICANT CHANGE UP (ref 1.2–3.4)
LYMPHOCYTES # BLD AUTO: 13.5 % — LOW (ref 20.5–51.1)
MAGNESIUM SERPL-MCNC: 1.9 MG/DL — SIGNIFICANT CHANGE UP (ref 1.8–2.4)
MAGNESIUM SERPL-MCNC: 1.9 MG/DL — SIGNIFICANT CHANGE UP (ref 1.8–2.4)
MAGNESIUM SERPL-MCNC: 2.5 MG/DL — HIGH (ref 1.8–2.4)
MCHC RBC-ENTMCNC: 31.2 PG — HIGH (ref 27–31)
MCHC RBC-ENTMCNC: 31.5 PG — HIGH (ref 27–31)
MCHC RBC-ENTMCNC: 31.8 PG — HIGH (ref 27–31)
MCHC RBC-ENTMCNC: 32.6 G/DL — SIGNIFICANT CHANGE UP (ref 32–37)
MCHC RBC-ENTMCNC: 32.7 G/DL — SIGNIFICANT CHANGE UP (ref 32–37)
MCHC RBC-ENTMCNC: 33 G/DL — SIGNIFICANT CHANGE UP (ref 32–37)
MCV RBC AUTO: 95.5 FL — SIGNIFICANT CHANGE UP (ref 81–99)
MCV RBC AUTO: 96.4 FL — SIGNIFICANT CHANGE UP (ref 81–99)
MCV RBC AUTO: 96.8 FL — SIGNIFICANT CHANGE UP (ref 81–99)
MONOCYTES # BLD AUTO: 0.62 K/UL — HIGH (ref 0.1–0.6)
MONOCYTES NFR BLD AUTO: 5.9 % — SIGNIFICANT CHANGE UP (ref 1.7–9.3)
NEUTROPHILS # BLD AUTO: 8.39 K/UL — HIGH (ref 1.4–6.5)
NEUTROPHILS NFR BLD AUTO: 79.4 % — HIGH (ref 42.2–75.2)
NRBC # BLD: 0 /100 WBCS — SIGNIFICANT CHANGE UP (ref 0–0)
PHOSPHATE SERPL-MCNC: 2.4 MG/DL — SIGNIFICANT CHANGE UP (ref 2.1–4.9)
PHOSPHATE SERPL-MCNC: 2.7 MG/DL — SIGNIFICANT CHANGE UP (ref 2.1–4.9)
PHOSPHATE SERPL-MCNC: 2.7 MG/DL — SIGNIFICANT CHANGE UP (ref 2.1–4.9)
PLATELET # BLD AUTO: 102 K/UL — LOW (ref 130–400)
PLATELET # BLD AUTO: 115 K/UL — LOW (ref 130–400)
PLATELET # BLD AUTO: 98 K/UL — LOW (ref 130–400)
POTASSIUM SERPL-MCNC: 3.8 MMOL/L — SIGNIFICANT CHANGE UP (ref 3.5–5)
POTASSIUM SERPL-MCNC: 3.9 MMOL/L — SIGNIFICANT CHANGE UP (ref 3.5–5)
POTASSIUM SERPL-MCNC: 4.1 MMOL/L — SIGNIFICANT CHANGE UP (ref 3.5–5)
POTASSIUM SERPL-SCNC: 3.8 MMOL/L — SIGNIFICANT CHANGE UP (ref 3.5–5)
POTASSIUM SERPL-SCNC: 3.9 MMOL/L — SIGNIFICANT CHANGE UP (ref 3.5–5)
POTASSIUM SERPL-SCNC: 4.1 MMOL/L — SIGNIFICANT CHANGE UP (ref 3.5–5)
RBC # BLD: 3.02 M/UL — LOW (ref 4.2–5.4)
RBC # BLD: 3.08 M/UL — LOW (ref 4.2–5.4)
RBC # BLD: 3.14 M/UL — LOW (ref 4.2–5.4)
RBC # FLD: 13.2 % — SIGNIFICANT CHANGE UP (ref 11.5–14.5)
RBC # FLD: 13.2 % — SIGNIFICANT CHANGE UP (ref 11.5–14.5)
RBC # FLD: 13.3 % — SIGNIFICANT CHANGE UP (ref 11.5–14.5)
SODIUM SERPL-SCNC: 136 MMOL/L — SIGNIFICANT CHANGE UP (ref 135–146)
SODIUM SERPL-SCNC: 139 MMOL/L — SIGNIFICANT CHANGE UP (ref 135–146)
SODIUM SERPL-SCNC: 141 MMOL/L — SIGNIFICANT CHANGE UP (ref 135–146)
WBC # BLD: 10.56 K/UL — SIGNIFICANT CHANGE UP (ref 4.8–10.8)
WBC # BLD: 12.31 K/UL — HIGH (ref 4.8–10.8)
WBC # BLD: 13.31 K/UL — HIGH (ref 4.8–10.8)
WBC # FLD AUTO: 10.56 K/UL — SIGNIFICANT CHANGE UP (ref 4.8–10.8)
WBC # FLD AUTO: 12.31 K/UL — HIGH (ref 4.8–10.8)
WBC # FLD AUTO: 13.31 K/UL — HIGH (ref 4.8–10.8)

## 2019-06-22 RX ORDER — TRAZODONE HCL 50 MG
50 TABLET ORAL ONCE
Refills: 0 | Status: COMPLETED | OUTPATIENT
Start: 2019-06-22 | End: 2019-06-22

## 2019-06-22 RX ORDER — MAGNESIUM SULFATE 500 MG/ML
2 VIAL (ML) INJECTION ONCE
Refills: 0 | Status: COMPLETED | OUTPATIENT
Start: 2019-06-22 | End: 2019-06-22

## 2019-06-22 RX ORDER — POTASSIUM CHLORIDE 20 MEQ
20 PACKET (EA) ORAL
Refills: 0 | Status: COMPLETED | OUTPATIENT
Start: 2019-06-22 | End: 2019-06-22

## 2019-06-22 RX ADMIN — Medication 650 MILLIGRAM(S): at 23:06

## 2019-06-22 RX ADMIN — Medication 50 MILLIEQUIVALENT(S): at 09:55

## 2019-06-22 RX ADMIN — Medication 50 MILLIEQUIVALENT(S): at 03:55

## 2019-06-22 RX ADMIN — HEPARIN SODIUM 5000 UNIT(S): 5000 INJECTION INTRAVENOUS; SUBCUTANEOUS at 21:17

## 2019-06-22 RX ADMIN — DONEPEZIL HYDROCHLORIDE 10 MILLIGRAM(S): 10 TABLET, FILM COATED ORAL at 21:17

## 2019-06-22 RX ADMIN — Medication 0.5 MILLIGRAM(S): at 19:00

## 2019-06-22 RX ADMIN — Medication 650 MILLIGRAM(S): at 11:29

## 2019-06-22 RX ADMIN — Medication 50 MILLIEQUIVALENT(S): at 05:14

## 2019-06-22 RX ADMIN — Medication 650 MILLIGRAM(S): at 05:13

## 2019-06-22 RX ADMIN — Medication 0.5 MILLIGRAM(S): at 15:35

## 2019-06-22 RX ADMIN — PANTOPRAZOLE SODIUM 40 MILLIGRAM(S): 20 TABLET, DELAYED RELEASE ORAL at 05:14

## 2019-06-22 RX ADMIN — Medication 650 MILLIGRAM(S): at 17:14

## 2019-06-22 RX ADMIN — ATORVASTATIN CALCIUM 20 MILLIGRAM(S): 80 TABLET, FILM COATED ORAL at 21:17

## 2019-06-22 RX ADMIN — HEPARIN SODIUM 5000 UNIT(S): 5000 INJECTION INTRAVENOUS; SUBCUTANEOUS at 13:19

## 2019-06-22 RX ADMIN — MEMANTINE HYDROCHLORIDE 10 MILLIGRAM(S): 10 TABLET ORAL at 05:14

## 2019-06-22 RX ADMIN — Medication 650 MILLIGRAM(S): at 06:30

## 2019-06-22 RX ADMIN — CHLORHEXIDINE GLUCONATE 1 APPLICATION(S): 213 SOLUTION TOPICAL at 05:14

## 2019-06-22 RX ADMIN — MEMANTINE HYDROCHLORIDE 10 MILLIGRAM(S): 10 TABLET ORAL at 17:14

## 2019-06-22 RX ADMIN — Medication 81 MILLIGRAM(S): at 11:29

## 2019-06-22 RX ADMIN — LOSARTAN POTASSIUM 25 MILLIGRAM(S): 100 TABLET, FILM COATED ORAL at 05:14

## 2019-06-22 RX ADMIN — Medication 50 GRAM(S): at 13:18

## 2019-06-22 RX ADMIN — HEPARIN SODIUM 5000 UNIT(S): 5000 INJECTION INTRAVENOUS; SUBCUTANEOUS at 05:14

## 2019-06-22 RX ADMIN — Medication 50 MILLIGRAM(S): at 22:22

## 2019-06-22 NOTE — PROGRESS NOTE ADULT - ASSESSMENT
Assessment:  89y Female patient admitted S/P Acute left superior and inferior pubic rami fracture, left distal radial fracture    Plan:  Diet regular  IVL  Pain control  Hypokalemia - repleted  Hypomagnesemia - repleted  Trend H/H for low hemoglobin  Encourage incentive spirometer use

## 2019-06-22 NOTE — PROGRESS NOTE ADULT - SUBJECTIVE AND OBJECTIVE BOX
Patient: GARETH CUEVAS , 89y (1919)Female     Procedure/Diagnosis: Acute left superior and inferior pubic rami fracture, left distal radial fracture  Events over 24h: No acute overnight events, patient seen and examined bedside with no active complaints.     Vitals: T(F): 96.8 (19 @ 00:00), Max: 99.3 (19 @ 21:00)  HR: 78 (19 @ 00:00)  BP: 134/56 (19 @ 00:00) (107/53 - 140/62)  RR: 18 (19 @ 00:00)  SpO2: 98% (19 @ 15:46)    In:   19 @ 07:01  -  19 @ 02:11  --------------------------------------------------------  IN: 150 mL      Out:   19 @ 07:01  -  19 @ 02:11  --------------------------------------------------------  OUT:  Total OUT: 0 mL      Net:   19 @ 07:01  -  19 @ 02:11  --------------------------------------------------------  NET: 150 mL      Diet: Diet, Regular (19 @ 22:00)    IV Fluids: Type: magnesium sulfate  IVPB 2 Gram(s) IV Intermittent once  potassium chloride  20 mEq/100 mL IVPB 20 milliEquivalent(s) IV Intermittent every 2 hours    Physical Examination:  General Appearance: NAD, alert and cooperative  HEENT: NCAT, WNL  Heart: S1 and S2. No murmurs. RRR  Lungs: CTABL  Abdomen: Soft, nondistended, nontender  Extremities: peripheral pulses 2+, no peripheral edema, full ROM    Medications: [Standing]  acetaminophen   Tablet .. 650 milliGRAM(s) Oral every 6 hours  aspirin  chewable 81 milliGRAM(s) Oral daily  atorvastatin 20 milliGRAM(s) Oral at bedtime  chlorhexidine 4% Liquid 1 Application(s) Topical <User Schedule>  donepezil 10 milliGRAM(s) Oral at bedtime  heparin  Injectable 5000 Unit(s) SubCutaneous every 8 hours  losartan 25 milliGRAM(s) Oral daily  magnesium sulfate  IVPB 2 Gram(s) IV Intermittent once  memantine 10 milliGRAM(s) Oral two times a day  pantoprazole    Tablet 40 milliGRAM(s) Oral before breakfast  potassium chloride  20 mEq/100 mL IVPB 20 milliEquivalent(s) IV Intermittent every 2 hours    DVT Prophylaxis: heparin  Injectable 5000 Unit(s) SubCutaneous every 8 hours  GI Prophylaxis: pantoprazole    Tablet 40 milliGRAM(s) Oral before breakfast    Antibiotics:   Anticoagulation:   Medications:[PRN]  LORazepam     Tablet 0.5 milliGRAM(s) Oral three times a day PRN    Labs:                        9.4    10.64 )-----------( 105      ( 2019 22:04 )             27.5     06-    140  |  101  |  27<H>  ----------------------------<  99  3.2<L>   |  27  |  1.3    Ca    7.9<L>      2019 21:37  Phos  2.8       Mg     1.8         TPro  6.8  /  Alb  3.6  /  TBili  0.6  /  DBili  x   /  AST  29  /  ALT  15  /  AlkPhos  87  06-20    LIVER FUNCTIONS - ( 2019 23:50 )  Alb: 3.6 g/dL / Pro: 6.8 g/dL / ALK PHOS: 87 U/L / ALT: 15 U/L / AST: 29 U/L / GGT: x           PT/INR - ( 2019 23:50 )   PT: 12.60 sec;   INR: 1.10 ratio    PTT - ( 2019 23:50 )  PTT:28.6 sec      Urine/Micro:    Urinalysis Basic - ( 2019 01:30 )    Color: Yellow / Appearance: Cloudy / S.010 / pH: x  Gluc: x / Ketone: Negative  / Bili: Negative / Urobili: 0.2 mg/dL   Blood: x / Protein: Trace mg/dL / Nitrite: Negative   Leuk Esterase: Trace / RBC: 6-10 /HPF / WBC 3-5 /HPF   Sq Epi: x / Non Sq Epi: x / Bacteria: x

## 2019-06-22 NOTE — PHYSICAL THERAPY INITIAL EVALUATION ADULT - SPECIFY REASON(S)
Chart reviewed. Pt. currently on bedrest. PT evaluation on hold pending OOB orders as appropriate. Will follow.
Pt declined participating in OOB activities due to fatigue and reported that she would get headache if she sits up.

## 2019-06-23 LAB
ANION GAP SERPL CALC-SCNC: 13 MMOL/L — SIGNIFICANT CHANGE UP (ref 7–14)
APTT BLD: 36.1 SEC — SIGNIFICANT CHANGE UP (ref 27–39.2)
BASE EXCESS BLDA CALC-SCNC: 3.2 MMOL/L — HIGH (ref -2–2)
BASOPHILS # BLD AUTO: 0.03 K/UL — SIGNIFICANT CHANGE UP (ref 0–0.2)
BASOPHILS NFR BLD AUTO: 0.2 % — SIGNIFICANT CHANGE UP (ref 0–1)
BLD GP AB SCN SERPL QL: SIGNIFICANT CHANGE UP
BUN SERPL-MCNC: 35 MG/DL — HIGH (ref 10–20)
CALCIUM SERPL-MCNC: 8.5 MG/DL — SIGNIFICANT CHANGE UP (ref 8.5–10.1)
CHLORIDE SERPL-SCNC: 102 MMOL/L — SIGNIFICANT CHANGE UP (ref 98–110)
CK MB CFR SERPL CALC: 4.7 NG/ML — SIGNIFICANT CHANGE UP (ref 0.6–6.3)
CK SERPL-CCNC: 224 U/L — SIGNIFICANT CHANGE UP (ref 0–225)
CO2 SERPL-SCNC: 23 MMOL/L — SIGNIFICANT CHANGE UP (ref 17–32)
CREAT SERPL-MCNC: 1.2 MG/DL — SIGNIFICANT CHANGE UP (ref 0.7–1.5)
EOSINOPHIL # BLD AUTO: 0.14 K/UL — SIGNIFICANT CHANGE UP (ref 0–0.7)
EOSINOPHIL NFR BLD AUTO: 1.1 % — SIGNIFICANT CHANGE UP (ref 0–8)
GLUCOSE BLDC GLUCOMTR-MCNC: 168 MG/DL — HIGH (ref 70–99)
GLUCOSE SERPL-MCNC: 101 MG/DL — HIGH (ref 70–99)
HCO3 BLDA-SCNC: 27 MMOL/L — SIGNIFICANT CHANGE UP (ref 23–27)
HCT VFR BLD CALC: 27.8 % — LOW (ref 37–47)
HGB BLD-MCNC: 9.3 G/DL — LOW (ref 12–16)
HOROWITZ INDEX BLDA+IHG-RTO: 21 — SIGNIFICANT CHANGE UP
IMM GRANULOCYTES NFR BLD AUTO: 0.7 % — HIGH (ref 0.1–0.3)
INR BLD: 1.02 RATIO — SIGNIFICANT CHANGE UP (ref 0.65–1.3)
LYMPHOCYTES # BLD AUTO: 1.5 K/UL — SIGNIFICANT CHANGE UP (ref 1.2–3.4)
LYMPHOCYTES # BLD AUTO: 11.9 % — LOW (ref 20.5–51.1)
MAGNESIUM SERPL-MCNC: 2.5 MG/DL — HIGH (ref 1.8–2.4)
MCHC RBC-ENTMCNC: 31.7 PG — HIGH (ref 27–31)
MCHC RBC-ENTMCNC: 33.5 G/DL — SIGNIFICANT CHANGE UP (ref 32–37)
MCV RBC AUTO: 94.9 FL — SIGNIFICANT CHANGE UP (ref 81–99)
MONOCYTES # BLD AUTO: 0.81 K/UL — HIGH (ref 0.1–0.6)
MONOCYTES NFR BLD AUTO: 6.4 % — SIGNIFICANT CHANGE UP (ref 1.7–9.3)
NEUTROPHILS # BLD AUTO: 10.02 K/UL — HIGH (ref 1.4–6.5)
NEUTROPHILS NFR BLD AUTO: 79.7 % — HIGH (ref 42.2–75.2)
NRBC # BLD: 0 /100 WBCS — SIGNIFICANT CHANGE UP (ref 0–0)
PCO2 BLDA: 35 MMHG — LOW (ref 38–42)
PH BLDA: 7.49 — HIGH (ref 7.38–7.42)
PHOSPHATE SERPL-MCNC: 2.5 MG/DL — SIGNIFICANT CHANGE UP (ref 2.1–4.9)
PLATELET # BLD AUTO: 120 K/UL — LOW (ref 130–400)
PO2 BLDA: 79 MMHG — SIGNIFICANT CHANGE UP (ref 78–95)
POTASSIUM SERPL-MCNC: 4.2 MMOL/L — SIGNIFICANT CHANGE UP (ref 3.5–5)
POTASSIUM SERPL-SCNC: 4.2 MMOL/L — SIGNIFICANT CHANGE UP (ref 3.5–5)
PROTHROM AB SERPL-ACNC: 11.7 SEC — SIGNIFICANT CHANGE UP (ref 9.95–12.87)
RBC # BLD: 2.93 M/UL — LOW (ref 4.2–5.4)
RBC # FLD: 13.3 % — SIGNIFICANT CHANGE UP (ref 11.5–14.5)
SAO2 % BLDA: 97 % — SIGNIFICANT CHANGE UP (ref 94–98)
SODIUM SERPL-SCNC: 138 MMOL/L — SIGNIFICANT CHANGE UP (ref 135–146)
TROPONIN T SERPL-MCNC: <0.01 NG/ML — SIGNIFICANT CHANGE UP
WBC # BLD: 12.59 K/UL — HIGH (ref 4.8–10.8)
WBC # FLD AUTO: 12.59 K/UL — HIGH (ref 4.8–10.8)

## 2019-06-23 PROCEDURE — 71045 X-RAY EXAM CHEST 1 VIEW: CPT | Mod: 26

## 2019-06-23 PROCEDURE — 93010 ELECTROCARDIOGRAM REPORT: CPT

## 2019-06-23 PROCEDURE — 99231 SBSQ HOSP IP/OBS SF/LOW 25: CPT

## 2019-06-23 PROCEDURE — 70450 CT HEAD/BRAIN W/O DYE: CPT | Mod: 26

## 2019-06-23 RX ORDER — SODIUM CHLORIDE 9 MG/ML
1000 INJECTION INTRAMUSCULAR; INTRAVENOUS; SUBCUTANEOUS
Refills: 0 | Status: DISCONTINUED | OUTPATIENT
Start: 2019-06-23 | End: 2019-06-25

## 2019-06-23 RX ADMIN — HEPARIN SODIUM 5000 UNIT(S): 5000 INJECTION INTRAVENOUS; SUBCUTANEOUS at 13:35

## 2019-06-23 RX ADMIN — HEPARIN SODIUM 5000 UNIT(S): 5000 INJECTION INTRAVENOUS; SUBCUTANEOUS at 05:00

## 2019-06-23 RX ADMIN — ATORVASTATIN CALCIUM 20 MILLIGRAM(S): 80 TABLET, FILM COATED ORAL at 21:26

## 2019-06-23 RX ADMIN — Medication 0.5 MILLIGRAM(S): at 21:30

## 2019-06-23 RX ADMIN — DONEPEZIL HYDROCHLORIDE 10 MILLIGRAM(S): 10 TABLET, FILM COATED ORAL at 21:26

## 2019-06-23 RX ADMIN — Medication 81 MILLIGRAM(S): at 13:35

## 2019-06-23 RX ADMIN — Medication 650 MILLIGRAM(S): at 14:00

## 2019-06-23 RX ADMIN — HEPARIN SODIUM 5000 UNIT(S): 5000 INJECTION INTRAVENOUS; SUBCUTANEOUS at 21:26

## 2019-06-23 RX ADMIN — Medication 650 MILLIGRAM(S): at 13:35

## 2019-06-23 RX ADMIN — CHLORHEXIDINE GLUCONATE 1 APPLICATION(S): 213 SOLUTION TOPICAL at 06:03

## 2019-06-23 RX ADMIN — LOSARTAN POTASSIUM 25 MILLIGRAM(S): 100 TABLET, FILM COATED ORAL at 05:00

## 2019-06-23 RX ADMIN — MEMANTINE HYDROCHLORIDE 10 MILLIGRAM(S): 10 TABLET ORAL at 05:00

## 2019-06-23 RX ADMIN — Medication 650 MILLIGRAM(S): at 05:00

## 2019-06-23 RX ADMIN — Medication 650 MILLIGRAM(S): at 21:30

## 2019-06-23 RX ADMIN — Medication 650 MILLIGRAM(S): at 22:00

## 2019-06-23 RX ADMIN — Medication 0.5 MILLIGRAM(S): at 05:00

## 2019-06-23 RX ADMIN — Medication 0.5 MILLIGRAM(S): at 13:36

## 2019-06-23 RX ADMIN — PANTOPRAZOLE SODIUM 40 MILLIGRAM(S): 20 TABLET, DELAYED RELEASE ORAL at 05:00

## 2019-06-23 NOTE — PROGRESS NOTE ADULT - ASSESSMENT
89y Female patient admitted S/P Acute left superior and inferior pubic rami fracture, left distal radial fracture    Plan:  Diet regular  IVL  Pain control  labs  dispo

## 2019-06-23 NOTE — CONSULT NOTE ADULT - ASSESSMENT
89 yo female with pmh of HTN, dementia, s/p recent fall resulting in pelvic and L radius fracture presents with episode of starring and unresponsiveness lasted approx. 1 hrs-1.5 hrs. Currently is back to her baseline. CTH is negative for acute changes.  Seizure vs TIA.    Plan:  REEG  MRI brain NC  Continue aspirin, statin  Seizure, fall precautions      Neuroattending note will follow 89 yo female with pmh of HTN, dementia, s/p recent fall resulting in pelvic and L radius fracture presents with episode of staring and unresponsiveness lasted approx. 1 hrs-1.5 hrs. Currently is back to her baseline. CTH is negative for acute changes.  Seizure vs TIA.    Plan:  REEG  MRI brain NC  Continue aspirin, statin  Seizure, fall precautions      Neuroattending note will follow

## 2019-06-23 NOTE — PROGRESS NOTE ADULT - SUBJECTIVE AND OBJECTIVE BOX
GENERAL SURGERY PROGRESS NOTE     GARETH CUEVASy  Female  Hospital day :2d  POD:  Procedure:   OVERNIGHT EVENTS:   no acute events.  WBC 13 from 10 from 12    T(F): 96.2 (06-23-19 @ 08:05), Max: 97.6 (06-22-19 @ 15:27)  HR: 83 (06-23-19 @ 08:05) (78 - 101)  BP: 124/66 (06-23-19 @ 08:05) (124/66 - 145/67)  RR: 18 (06-23-19 @ 08:05) (17 - 18)    DIET/FLUIDS:   GI proph:  pantoprazole    Tablet 40 milliGRAM(s) Oral before breakfast    AC/ proph: aspirin  chewable 81 milliGRAM(s) Oral daily  heparin  Injectable 5000 Unit(s) SubCutaneous every 8 hours    PHYSICAL EXAM:  GENERAL: NAD, well-appearing  CHEST/LUNG: Clear to auscultation bilaterally  HEART: Regular rate and rhythm  ABDOMEN: Soft, Nontender, Nondistended;   EXTREMITIES:  No clubbing, cyanosis, or edema      LABS  Labs:  CAPILLARY BLOOD GLUCOSE                        9.7    13.31 )-----------( 115      ( 22 Jun 2019 20:57 )             29.8       Auto Neutrophil %: 79.4 % (06-22-19 @ 13:22)  Auto Immature Granulocyte %: 0.4 % (06-22-19 @ 13:22)    06-22    136  |  99  |  30<H>  ----------------------------<  111<H>  3.8   |  24  |  1.2      Calcium, Total Serum: 8.4 mg/dL (06-22-19 @ 20:57)       Lactate, Blood: 2.1 mmol/L (06-20-19 @ 23:50)      Serum Pro-Brain Natriuretic Peptide: 627 pg/mL (06-17-19 @ 23:40)              RADIOLOGY & ADDITIONAL TESTS:      A/P

## 2019-06-23 NOTE — CONSULT NOTE ADULT - ATTENDING COMMENTS
Patient examined.  She has advanced dementia and says "I don't remember" to most questions.  She can follow simple commands.  She is able to tell me her age as "upper nineties"  She knows she is not at home.  She nods yest to "are you in a hospital"  No significant asymmetries noted on exam.  Routine EEG was normal.    Okay for 24-hour Video EEG.  Telemetry  Medication simplification.  May be able to lower memantine a little e.g. to 5 mg po bid to lessen liklihood for syncope.

## 2019-06-23 NOTE — CONSULT NOTE ADULT - SUBJECTIVE AND OBJECTIVE BOX
Neurology Consult    Patient is a 89y old  Female who presents with a chief complaint of s/p trip and fall (23 Jun 2019 09:05)      HPI:  89F w/ PMHx of  HTN, dementia presenting after trip & fall onto left side. Family witnessed fall forward and states patient caught herself on her hands. - HT, -  LOC, - AC. c/o  left hand pain and left hip pain. Left hip pain worse with knee or hip flexion or weight bearing. (21 Jun 2019 03:19)  Patient with baseline dementia presents s/p episode of starring, unresponsiveness, not following commands that started around 4 pm. Patient was taken for CTH, no acute findings. At the time I examined the patient (6.30pm) she is awake and alert, answers questions, follows commands, speech is non dysarthric. She ANOx1 at the baseline and does not answer questions appropriately or forgets.  Otherwise exam is non focal.      PAST MEDICAL & SURGICAL HISTORY:  Hyperlipidemia  Hypertension  Constipation  TIA (transient ischemic attack)  Urinary retention  No significant past surgical history      FAMILY HISTORY:  No pertinent family history in first degree relatives      Social History: (-) x 3    Allergies    No Known Allergies    Intolerances        MEDICATIONS  (STANDING):  acetaminophen   Tablet .. 650 milliGRAM(s) Oral every 6 hours  aspirin  chewable 81 milliGRAM(s) Oral daily  atorvastatin 20 milliGRAM(s) Oral at bedtime  chlorhexidine 4% Liquid 1 Application(s) Topical <User Schedule>  donepezil 10 milliGRAM(s) Oral at bedtime  heparin  Injectable 5000 Unit(s) SubCutaneous every 8 hours  losartan 25 milliGRAM(s) Oral daily  memantine 10 milliGRAM(s) Oral two times a day  pantoprazole    Tablet 40 milliGRAM(s) Oral before breakfast  sodium chloride 0.9%. 1000 milliLiter(s) (75 mL/Hr) IV Continuous <Continuous>    MEDICATIONS  (PRN):  LORazepam     Tablet 0.5 milliGRAM(s) Oral three times a day PRN Anxiety      Review of systems:    Constitutional: No fever, weight loss or fatigue    Eyes: No eye pain or discharge  ENMT:  No difficulty hearing; No sinus or throat pain  Neck: No pain or stiffness  Respiratory: No cough, wheezing, chills or hemoptysis  Cardiovascular: No chest pain, palpitations, shortness of breath, dyspnea on exertion  Gastrointestinal: No abdominal pain, nausea, vomiting or hematemesis; No diarrhea or constipation.   Genitourinary: No dysuria, frequency, hematuria or incontinence  Neurological: As per HPI  Skin: No rashes or lesions   Endocrine: No heat or cold intolerance; No hair loss  Musculoskeletal: No joint pain or swelling  Psychiatric: No depression, anxiety, mood swings  Heme/Lymph: No easy bruising or bleeding gums    Vital Signs Last 24 Hrs  T(C): 36 (23 Jun 2019 15:08), Max: 36.2 (23 Jun 2019 00:00)  T(F): 96.8 (23 Jun 2019 15:08), Max: 97.2 (23 Jun 2019 00:00)  HR: 89 (23 Jun 2019 15:08) (83 - 101)  BP: 112/59 (23 Jun 2019 15:08) (112/59 - 145/67)  BP(mean): --  RR: 19 (23 Jun 2019 15:08) (17 - 19)  SpO2: 98% (23 Jun 2019 08:15) (98% - 98%)    Neurologic Examination:  General:  Appearance is consistent with chronologic age.  No abnormal facies. ANOx1 baseline  General:   Language with normal repetition, comprehension and naming.  Nondysarthric.    Cranial nerves: intact VA, VFF.  EOMI w/o nystagmus, skew or reported double vision.  PERRL.  No ptosis/weakness of eyelid closure.  Facial sensation is normal with normal bite.  No facial asymmetry.  Hearing grossly intact b/l.  Palate elevates midline.  Tongue midline.  Motor examination:   Normal tone, bulk and range of motion.  No tenderness, twitching, tremors or involuntary movements.  Formal Muscle Strength Testing: (MRC grade R/L) 5/5 UE; 5/5 LE.  No observable drift. left Upper extr is in cast s/p radial fracture  Reflexes:   2+ b/l pectoralis, biceps, triceps, brachioradialis, patella and Achilles.  Plantar response downgoing b/l.  Jaw jerk, Rell, clonus absent.  Sensory examination:   Intact to light touch and pinprick, pain,   Cerebellum:   FTN/HKS intact with normal MARIE in all limbs.  No dysmetria or dysdiadokinesia.      Labs:   CBC Full  -  ( 23 Jun 2019 16:20 )  WBC Count : 12.59 K/uL  RBC Count : 2.93 M/uL  Hemoglobin : 9.3 g/dL  Hematocrit : 27.8 %  Platelet Count - Automated : 120 K/uL  Mean Cell Volume : 94.9 fL  Mean Cell Hemoglobin : 31.7 pg  Mean Cell Hemoglobin Concentration : 33.5 g/dL  Auto Neutrophil # : 10.02 K/uL  Auto Lymphocyte # : 1.50 K/uL  Auto Monocyte # : 0.81 K/uL  Auto Eosinophil # : 0.14 K/uL  Auto Basophil # : 0.03 K/uL  Auto Neutrophil % : 79.7 %  Auto Lymphocyte % : 11.9 %  Auto Monocyte % : 6.4 %  Auto Eosinophil % : 1.1 %  Auto Basophil % : 0.2 %    06-23    138  |  102  |  35<H>  ----------------------------<  101<H>  4.2   |  23  |  1.2    Ca    8.5      23 Jun 2019 16:20  Phos  2.5     06-23  Mg     2.5     06-23        PT/INR - ( 23 Jun 2019 16:20 )   PT: 11.70 sec;   INR: 1.02 ratio         PTT - ( 23 Jun 2019 16:20 )  PTT:36.1 sec        Neuroimaging:  NCHCT: CT Head No Cont:   EXAM:  CT BRAIN            PROCEDURE DATE:  06/23/2019            INTERPRETATION:  Clinical History / Reason for exam: Rapid response.    Technique: Noncontrast head CT.  Contiguous unenhanced CT axial images of   the head from the base to the vertex with coronal and sagittal   reformatted images.    Comparison: Noncontrast CT head 6/21/2019.    Findings:    The ventricles, basal cisterns and sulcal pattern are prominent   consistent with parenchymal volume loss.    There are patchy hypodensities in the periventricular and subcortical   white matter without mass effect compatible with chronic microvascular   changes.      There is no acute intracranial hemorrhage, extra-axial fluid collection   or midline shift.     Gray white matter differentiation is maintained.    There is calcific atherosclerotic disease at the skull base.    The calvarium is intact. The visualized paranasal sinuses and mastoid air   cells are unremarkable.        IMPRESSION:     1.  No evidence of acute intracranial hemorrhage, mass effect or   territorial infarct    2.  Chronic microvascular ischemic changes.    3.  If patient remains symptomatic and patient is able, MRI of the brain   may be obtained for further evaluation.              BRITTANY OLGUIN M.D., RESIDENT RADIOLOGIST  This document has been electronically signed.  SHAYAN MAYO M.D., ATTENDING RADIOLOGIST  This document has been electronically signed. Jun 23 2019  5:41PM             (06-23-19 @ 16:50)    CT Angiography/Perfusion:  MRI Brain NC:  MRA Head/Neck:  EEG:    Assessment:  This is a 89y Female with h/o     Plan:   -   06-23-19 @ 18:55 Neurology Consult    Patient is a 89y old  Female who presents with a chief complaint of s/p trip and fall (23 Jun 2019 09:05)      HPI:  89F w/ PMHx of  HTN, dementia presenting after trip & fall onto left side. Family witnessed fall forward and states patient caught herself on her hands. - HT, -  LOC, - AC. c/o  left hand pain and left hip pain. Left hip pain worse with knee or hip flexion or weight bearing. (21 Jun 2019 03:19)  Patient with baseline dementia presents s/p episode of starring, unresponsiveness, not following commands that started around 4 pm. Patient was taken for CTH, no acute findings. At the time I examined the patient (6.30pm) she is awake and alert, answers questions, follows commands, speech is non dysarthric. She ANOx1 at the baseline and does not answer questions appropriately or forgets.  Otherwise exam is non focal.      PAST MEDICAL & SURGICAL HISTORY:  Hyperlipidemia  Hypertension  Constipation  TIA (transient ischemic attack)  Urinary retention  No significant past surgical history      FAMILY HISTORY:  No pertinent family history in first degree relatives      Social History: (-) x 3    Allergies    No Known Allergies    Intolerances        MEDICATIONS  (STANDING):  acetaminophen   Tablet .. 650 milliGRAM(s) Oral every 6 hours  aspirin  chewable 81 milliGRAM(s) Oral daily  atorvastatin 20 milliGRAM(s) Oral at bedtime  chlorhexidine 4% Liquid 1 Application(s) Topical <User Schedule>  donepezil 10 milliGRAM(s) Oral at bedtime  heparin  Injectable 5000 Unit(s) SubCutaneous every 8 hours  losartan 25 milliGRAM(s) Oral daily  memantine 10 milliGRAM(s) Oral two times a day  pantoprazole    Tablet 40 milliGRAM(s) Oral before breakfast  sodium chloride 0.9%. 1000 milliLiter(s) (75 mL/Hr) IV Continuous <Continuous>    MEDICATIONS  (PRN):  LORazepam     Tablet 0.5 milliGRAM(s) Oral three times a day PRN Anxiety      Review of systems:    Constitutional: No fever, weight loss or fatigue    Eyes: No eye pain or discharge  ENMT:  No difficulty hearing; No sinus or throat pain  Neck: No pain or stiffness  Respiratory: No cough, wheezing, chills or hemoptysis  Cardiovascular: No chest pain, palpitations, shortness of breath, dyspnea on exertion  Gastrointestinal: No abdominal pain, nausea, vomiting or hematemesis; No diarrhea or constipation.   Genitourinary: No dysuria, frequency, hematuria or incontinence  Neurological: As per HPI  Skin: No rashes or lesions   Endocrine: No heat or cold intolerance; No hair loss  Musculoskeletal: No joint pain or swelling  Psychiatric: No depression, anxiety, mood swings  Heme/Lymph: No easy bruising or bleeding gums    Vital Signs Last 24 Hrs  T(C): 36 (23 Jun 2019 15:08), Max: 36.2 (23 Jun 2019 00:00)  T(F): 96.8 (23 Jun 2019 15:08), Max: 97.2 (23 Jun 2019 00:00)  HR: 89 (23 Jun 2019 15:08) (83 - 101)  BP: 112/59 (23 Jun 2019 15:08) (112/59 - 145/67)  BP(mean): --  RR: 19 (23 Jun 2019 15:08) (17 - 19)  SpO2: 98% (23 Jun 2019 08:15) (98% - 98%)    Neurologic Examination:  General:  Appearance is consistent with chronologic age.  No abnormal facies. ANOx1 baseline  General:   Language with normal repetition, comprehension and naming.  Nondysarthric.    Cranial nerves: intact VA, VFF.  EOMI w/o nystagmus, skew or reported double vision.  PERRL.  No ptosis/weakness of eyelid closure.  Facial sensation is normal with normal bite.  No facial asymmetry.  Hearing grossly intact b/l.  Palate elevates midline.  Tongue midline.  Motor examination:   Normal tone, bulk and range of motion.  No tenderness, twitching, tremors or involuntary movements.  Formal Muscle Strength Testing: (MRC grade R/L) 5/5 UE; 5/5 LE.  No observable drift. left Upper extr is in cast s/p radial fracture  Reflexes:   2+ b/l pectoralis, biceps, triceps, brachioradialis, patella and Achilles.  Plantar response downgoing b/l.  Jaw jerk, Rell, clonus absent.  Sensory examination:   Intact to light touch and pinprick, pain,   Cerebellum:   FTN/HKS intact with normal MARIE in all limbs.  No dysmetria or dysdiadokinesia.      Labs:   CBC Full  -  ( 23 Jun 2019 16:20 )  WBC Count : 12.59 K/uL  RBC Count : 2.93 M/uL  Hemoglobin : 9.3 g/dL  Hematocrit : 27.8 %  Platelet Count - Automated : 120 K/uL  Mean Cell Volume : 94.9 fL  Mean Cell Hemoglobin : 31.7 pg  Mean Cell Hemoglobin Concentration : 33.5 g/dL  Auto Neutrophil # : 10.02 K/uL  Auto Lymphocyte # : 1.50 K/uL  Auto Monocyte # : 0.81 K/uL  Auto Eosinophil # : 0.14 K/uL  Auto Basophil # : 0.03 K/uL  Auto Neutrophil % : 79.7 %  Auto Lymphocyte % : 11.9 %  Auto Monocyte % : 6.4 %  Auto Eosinophil % : 1.1 %  Auto Basophil % : 0.2 %    06-23    138  |  102  |  35<H>  ----------------------------<  101<H>  4.2   |  23  |  1.2    Ca    8.5      23 Jun 2019 16:20  Phos  2.5     06-23  Mg     2.5     06-23        PT/INR - ( 23 Jun 2019 16:20 )   PT: 11.70 sec;   INR: 1.02 ratio         PTT - ( 23 Jun 2019 16:20 )  PTT:36.1 sec        Neuroimaging:  NCHCT: CT Head No Cont:   EXAM:  CT BRAIN            PROCEDURE DATE:  06/23/2019            INTERPRETATION:  Clinical History / Reason for exam: Rapid response.    Technique: Noncontrast head CT.  Contiguous unenhanced CT axial images of   the head from the base to the vertex with coronal and sagittal   reformatted images.    Comparison: Noncontrast CT head 6/21/2019.    Findings:    The ventricles, basal cisterns and sulcal pattern are prominent   consistent with parenchymal volume loss.    There are patchy hypodensities in the periventricular and subcortical   white matter without mass effect compatible with chronic microvascular   changes.      There is no acute intracranial hemorrhage, extra-axial fluid collection   or midline shift.     Gray white matter differentiation is maintained.    There is calcific atherosclerotic disease at the skull base.    The calvarium is intact. The visualized paranasal sinuses and mastoid air   cells are unremarkable.        IMPRESSION:     1.  No evidence of acute intracranial hemorrhage, mass effect or   territorial infarct    2.  Chronic microvascular ischemic changes.    3.  If patient remains symptomatic and patient is able, MRI of the brain   may be obtained for further evaluation.              BRITTANY OLGUIN M.D., RESIDENT RADIOLOGIST  This document has been electronically signed.  SHAYAN MAYO M.D., ATTENDING RADIOLOGIST  This document has been electronically signed. Jun 23 2019  5:41PM             (06-23-19 @ 16:50)    -   06-23-19 @ 18:55

## 2019-06-24 LAB
ANION GAP SERPL CALC-SCNC: 9 MMOL/L — SIGNIFICANT CHANGE UP (ref 7–14)
BASOPHILS # BLD AUTO: 0.03 K/UL — SIGNIFICANT CHANGE UP (ref 0–0.2)
BASOPHILS NFR BLD AUTO: 0.3 % — SIGNIFICANT CHANGE UP (ref 0–1)
BUN SERPL-MCNC: 32 MG/DL — HIGH (ref 10–20)
CALCIUM SERPL-MCNC: 8.1 MG/DL — LOW (ref 8.5–10.1)
CHLORIDE SERPL-SCNC: 106 MMOL/L — SIGNIFICANT CHANGE UP (ref 98–110)
CO2 SERPL-SCNC: 25 MMOL/L — SIGNIFICANT CHANGE UP (ref 17–32)
CREAT SERPL-MCNC: 1 MG/DL — SIGNIFICANT CHANGE UP (ref 0.7–1.5)
EOSINOPHIL # BLD AUTO: 0.26 K/UL — SIGNIFICANT CHANGE UP (ref 0–0.7)
EOSINOPHIL NFR BLD AUTO: 2.5 % — SIGNIFICANT CHANGE UP (ref 0–8)
GLUCOSE SERPL-MCNC: 95 MG/DL — SIGNIFICANT CHANGE UP (ref 70–99)
HCT VFR BLD CALC: 25.2 % — LOW (ref 37–47)
HGB BLD-MCNC: 8.2 G/DL — LOW (ref 12–16)
IMM GRANULOCYTES NFR BLD AUTO: 0.6 % — HIGH (ref 0.1–0.3)
LYMPHOCYTES # BLD AUTO: 1.85 K/UL — SIGNIFICANT CHANGE UP (ref 1.2–3.4)
LYMPHOCYTES # BLD AUTO: 17.9 % — LOW (ref 20.5–51.1)
MAGNESIUM SERPL-MCNC: 2.2 MG/DL — SIGNIFICANT CHANGE UP (ref 1.8–2.4)
MCHC RBC-ENTMCNC: 31.4 PG — HIGH (ref 27–31)
MCHC RBC-ENTMCNC: 32.5 G/DL — SIGNIFICANT CHANGE UP (ref 32–37)
MCV RBC AUTO: 96.6 FL — SIGNIFICANT CHANGE UP (ref 81–99)
MONOCYTES # BLD AUTO: 0.66 K/UL — HIGH (ref 0.1–0.6)
MONOCYTES NFR BLD AUTO: 6.4 % — SIGNIFICANT CHANGE UP (ref 1.7–9.3)
NEUTROPHILS # BLD AUTO: 7.47 K/UL — HIGH (ref 1.4–6.5)
NEUTROPHILS NFR BLD AUTO: 72.3 % — SIGNIFICANT CHANGE UP (ref 42.2–75.2)
NRBC # BLD: 0 /100 WBCS — SIGNIFICANT CHANGE UP (ref 0–0)
PHOSPHATE SERPL-MCNC: 2.6 MG/DL — SIGNIFICANT CHANGE UP (ref 2.1–4.9)
PLATELET # BLD AUTO: 113 K/UL — LOW (ref 130–400)
POTASSIUM SERPL-MCNC: 3.9 MMOL/L — SIGNIFICANT CHANGE UP (ref 3.5–5)
POTASSIUM SERPL-SCNC: 3.9 MMOL/L — SIGNIFICANT CHANGE UP (ref 3.5–5)
RBC # BLD: 2.61 M/UL — LOW (ref 4.2–5.4)
RBC # FLD: 13.5 % — SIGNIFICANT CHANGE UP (ref 11.5–14.5)
SODIUM SERPL-SCNC: 140 MMOL/L — SIGNIFICANT CHANGE UP (ref 135–146)
WBC # BLD: 10.33 K/UL — SIGNIFICANT CHANGE UP (ref 4.8–10.8)
WBC # FLD AUTO: 10.33 K/UL — SIGNIFICANT CHANGE UP (ref 4.8–10.8)

## 2019-06-24 PROCEDURE — 99221 1ST HOSP IP/OBS SF/LOW 40: CPT

## 2019-06-24 PROCEDURE — 99231 SBSQ HOSP IP/OBS SF/LOW 25: CPT

## 2019-06-24 PROCEDURE — 99497 ADVNCD CARE PLAN 30 MIN: CPT | Mod: 25

## 2019-06-24 RX ORDER — HALOPERIDOL DECANOATE 100 MG/ML
1 INJECTION INTRAMUSCULAR AT BEDTIME
Refills: 0 | Status: DISCONTINUED | OUTPATIENT
Start: 2019-06-24 | End: 2019-07-03

## 2019-06-24 RX ORDER — HALOPERIDOL DECANOATE 100 MG/ML
1 INJECTION INTRAMUSCULAR EVERY 6 HOURS
Refills: 0 | Status: DISCONTINUED | OUTPATIENT
Start: 2019-06-24 | End: 2019-07-03

## 2019-06-24 RX ADMIN — Medication 650 MILLIGRAM(S): at 17:23

## 2019-06-24 RX ADMIN — CHLORHEXIDINE GLUCONATE 1 APPLICATION(S): 213 SOLUTION TOPICAL at 05:29

## 2019-06-24 RX ADMIN — Medication 650 MILLIGRAM(S): at 05:36

## 2019-06-24 RX ADMIN — SODIUM CHLORIDE 75 MILLILITER(S): 9 INJECTION INTRAMUSCULAR; INTRAVENOUS; SUBCUTANEOUS at 23:49

## 2019-06-24 RX ADMIN — DONEPEZIL HYDROCHLORIDE 10 MILLIGRAM(S): 10 TABLET, FILM COATED ORAL at 21:37

## 2019-06-24 RX ADMIN — MEMANTINE HYDROCHLORIDE 10 MILLIGRAM(S): 10 TABLET ORAL at 17:23

## 2019-06-24 RX ADMIN — HEPARIN SODIUM 5000 UNIT(S): 5000 INJECTION INTRAVENOUS; SUBCUTANEOUS at 05:30

## 2019-06-24 RX ADMIN — Medication 650 MILLIGRAM(S): at 12:00

## 2019-06-24 RX ADMIN — HEPARIN SODIUM 5000 UNIT(S): 5000 INJECTION INTRAVENOUS; SUBCUTANEOUS at 13:15

## 2019-06-24 RX ADMIN — Medication 650 MILLIGRAM(S): at 23:51

## 2019-06-24 RX ADMIN — Medication 81 MILLIGRAM(S): at 11:19

## 2019-06-24 RX ADMIN — PANTOPRAZOLE SODIUM 40 MILLIGRAM(S): 20 TABLET, DELAYED RELEASE ORAL at 05:29

## 2019-06-24 RX ADMIN — ATORVASTATIN CALCIUM 20 MILLIGRAM(S): 80 TABLET, FILM COATED ORAL at 21:37

## 2019-06-24 RX ADMIN — HALOPERIDOL DECANOATE 1 MILLIGRAM(S): 100 INJECTION INTRAMUSCULAR at 21:39

## 2019-06-24 RX ADMIN — MEMANTINE HYDROCHLORIDE 10 MILLIGRAM(S): 10 TABLET ORAL at 05:30

## 2019-06-24 RX ADMIN — Medication 650 MILLIGRAM(S): at 05:30

## 2019-06-24 RX ADMIN — Medication 650 MILLIGRAM(S): at 11:19

## 2019-06-24 RX ADMIN — HEPARIN SODIUM 5000 UNIT(S): 5000 INJECTION INTRAVENOUS; SUBCUTANEOUS at 21:37

## 2019-06-24 RX ADMIN — LOSARTAN POTASSIUM 25 MILLIGRAM(S): 100 TABLET, FILM COATED ORAL at 05:30

## 2019-06-24 NOTE — PHYSICAL THERAPY INITIAL EVALUATION ADULT - GENERAL OBSERVATIONS, REHAB EVAL
encountered supine in bed, moaning constantly. Time in: 8:00 Time out: 8:30 AM
Pt was found lying supine in bed. PT educated the pt on importance of OOB activities. However pt continue to decline to sit up at the OOB or getting OOB. PT will f/u when appropriate.

## 2019-06-24 NOTE — PROVIDER CONTACT NOTE (OTHER) - SITUATION
Patient currently has a NPO order from 6/23, as per surgical note at 04:28am patient should be on regular diet.

## 2019-06-24 NOTE — PHYSICAL THERAPY INITIAL EVALUATION ADULT - IMPAIRMENTS FOUND, PT EVAL
poor safety awareness/gait, locomotion, and balance/muscle strength/arousal, attention, and cognition/cognitive impairment/aerobic capacity/endurance

## 2019-06-24 NOTE — CONSULT NOTE ADULT - SUBJECTIVE AND OBJECTIVE BOX
REQUESTED OF: DR Glass (formerly Jose)    CLINICAL ISSUE TO BE EVALUATED BY CONSULTANT: Goals of care         GARETH CUEVAS 89yFemale  HPI:  89F w/ PMHx of  HTN, dementia presenting after trip & fall onto left side. Family witnessed fall forward and states patient caught herself on her hands. - HT, -  LOC, - AC. c/o  left hand pain and left hip pain. Left hip pain worse with knee or hip flexion or weight bearing. (21 Jun 2019 03:19)    Pt seen on medical floor with daughter at bedside. Pt lethargic/arousable, tolerating po intake with assistance. Pain well controlled. No N/V/dyspnea.      PAST MEDICAL & SURGICAL HISTORY:  Hyperlipidemia  Hypertension  Constipation  TIA (transient ischemic attack)  Urinary retention  No significant past surgical history        PHYSICAL EXAM:  Elderly female, oriented x 2, nad  PERRL  RRR  Abdom soft  R UE casted  no edema  no focal deficits       T(C): 35.9, Max: 35.9 (07:30)  HR: 80 (74 - 82)  BP: 143/62 (129/61 - 151/68)  RR: 18 (18 - 18)  SpO2: --      LABS/STUDIES:  06-24    140  |  106  |  32<H>  ----------------------------<  95  3.9   |  25  |  1.0    Ca    8.1<L>      24 Jun 2019 01:50  Phos  2.6     06-24  Mg     2.2     06-24                              8.2    10.33 )-----------( 113      ( 24 Jun 2019 01:50 )             25.2       MEDICATIONS  (STANDING):  acetaminophen   Tablet .. 650 milliGRAM(s) Oral every 6 hours  aspirin  chewable 81 milliGRAM(s) Oral daily  atorvastatin 20 milliGRAM(s) Oral at bedtime  chlorhexidine 4% Liquid 1 Application(s) Topical <User Schedule>  donepezil 10 milliGRAM(s) Oral at bedtime  haloperidol    Concentrate 1 milliGRAM(s) Oral at bedtime  heparin  Injectable 5000 Unit(s) SubCutaneous every 8 hours  losartan 25 milliGRAM(s) Oral daily  memantine 10 milliGRAM(s) Oral two times a day  pantoprazole    Tablet 40 milliGRAM(s) Oral before breakfast  sodium chloride 0.9%. 1000 milliLiter(s) (75 mL/Hr) IV Continuous <Continuous>    MEDICATIONS  (PRN):  haloperidol    Concentrate 1 milliGRAM(s) Oral every 6 hours PRN agitation        Jefferson Symptom Assesment Scale      PPS (Palliative Performance Scale): 30

## 2019-06-24 NOTE — PROGRESS NOTE ADULT - ASSESSMENT
89y Female patient admitted S/P Acute left superior and inferior pubic rami fracture, left distal radial fracture    Plan:  F/u Neuro recs for possible MRI   Diet regular  IVL  Pain control  labs  dispo

## 2019-06-24 NOTE — CONSULT NOTE ADULT - ASSESSMENT
89yFemale being evaluated for goals of care.      Case d/w Surgical /trauma team. Pt planned for transfer to medical service for further neuro work up, r/o seizure activity with progressive dementia.    Met with pt's daughter/caretaker. Clinical condition well understood, overall poor prognosis known. Daughter aware of pt's clinical decline over time as she reports pt has been more confused lately, requesting to see  relatives, requiring more care, etc.   Events from overnight noted. Daughter reports pt with significant sun-downing which worsened after receipt of ativan. Pt utilizes ativan at home and continued to have periods of agitation following its use.     Palliative Care services introduced.   Daughter reports pt with living will confirming pt's wishes to defer any aggressive/invasive life sustaining means. Daughter to bring documentation to hospital. In interim, plan to continue neuro work up with goal to return pt home with home care and PT.     In understanding pt may be entering end stages of her life, daughter considering transition to home hospice care if no further improvement in status to honor pt's previously stated wishes.     Supportive counseling provided to daughter.    Recommendations:  Initiate DNR/DNi status, MOLST completed   ongoing med mngnmt  suggest avoiding benzos for now   add haldol 1mg po qhs and q6h PRN agitation  tylenol ATC for pain  add low dose oxycodone 5mg po q4h PRN pain refractory to tylenol use    Neuro to follow- MRI, EEG pending    We will follow  [id number]

## 2019-06-24 NOTE — CHART NOTE - NSCHARTNOTEFT_GEN_A_CORE
As per surgery team request, patient will be transferred to the medical service for inpatient neurological work-up. The surgical resident will sign out to the medical team (confirmed with the surgical team).

## 2019-06-24 NOTE — PROGRESS NOTE ADULT - SUBJECTIVE AND OBJECTIVE BOX
Progress Note: Surgery  Patient: GARETH CUEVAS , 89y (21-Nov-1919)Female   MRN: [id number]  Location: [address]  Visit: 06-21-19 Inpatient  Date: 06-24-19 @ 04:26    Procedure/Injury:     Events over 24h: rapid response called yesterday for decreased responsiveness, CT head was done with no acute findings, neurology was consulted, patient returned to baseline level of dementia, labs unremarkable     Vitals: T(F): 96.4 (06-24-19 @ 00:00), Max: 96.8 (06-23-19 @ 15:08)  HR: 82 (06-24-19 @ 00:00)  BP: 129/61 (06-24-19 @ 00:00) (112/59 - 136/61)  RR: 18 (06-24-19 @ 00:00)  SpO2: 98% (06-23-19 @ 08:15)    Diet: Diet, NPO (06-23-19 @ 18:20)      Bowel function:   Bowel movement []   Flatus []    In:   06-22-19 @ 07:01  -  06-23-19 @ 07:00  --------------------------------------------------------  IN: 460 mL    06-23-19 @ 07:01  -  06-24-19 @ 04:26  --------------------------------------------------------  IN: 1280 mL      Out:   06-22-19 @ 07:01  -  06-23-19 @ 07:00  --------------------------------------------------------  OUT:  Total OUT: 0 mL      06-23-19 @ 07:01  -  06-24-19 @ 04:26  --------------------------------------------------------  OUT:  Total OUT: 0 mL        Net:   06-22-19 @ 07:01  -  06-23-19 @ 07:00  --------------------------------------------------------  NET: 460 mL    06-23-19 @ 07:01  -  06-24-19 @ 04:26  --------------------------------------------------------  NET: 1280 mL        Physical Examination:  General: NAD, lying in bed comfortably   HEENT: NCAT, JOCELYN, WNL  Heart: S1 S2, No MRG RRR   Lungs: CTABL +BS Equal BL, No WRC  Abdomen: Soft, non-distended, nontender.     Medications: [Standing]  acetaminophen   Tablet .. 650 milliGRAM(s) Oral every 6 hours  aspirin  chewable 81 milliGRAM(s) Oral daily  atorvastatin 20 milliGRAM(s) Oral at bedtime  chlorhexidine 4% Liquid 1 Application(s) Topical <User Schedule>  donepezil 10 milliGRAM(s) Oral at bedtime  heparin  Injectable 5000 Unit(s) SubCutaneous every 8 hours  losartan 25 milliGRAM(s) Oral daily  memantine 10 milliGRAM(s) Oral two times a day  pantoprazole    Tablet 40 milliGRAM(s) Oral before breakfast  sodium chloride 0.9%. 1000 milliLiter(s) (75 mL/Hr) IV Continuous <Continuous>    Medications:[PRN]  LORazepam     Tablet 0.5 milliGRAM(s) Oral three times a day PRN    Labs:                        8.2    10.33 )-----------( 113      ( 24 Jun 2019 01:50 )             25.2     06-24    140  |  106  |  32<H>  ----------------------------<  95  3.9   |  25  |  1.0    Ca    8.1<L>      24 Jun 2019 01:50  Phos  2.6     06-24  Mg     2.2     06-24        PT/INR - ( 23 Jun 2019 16:20 )   PT: 11.70 sec;   INR: 1.02 ratio         PTT - ( 23 Jun 2019 16:20 )  PTT:36.1 sec  ABG - ( 23 Jun 2019 16:06 )  pH: 7.49  /  pCO2: 35    /  pO2: 79    / HCO3: 27    / Base Excess: 3.2   /  SaO2: 97        CARDIAC MARKERS ( 23 Jun 2019 16:20 )  x     / <0.01 ng/mL / 224 U/L / x     / 4.7 ng/mL    Date/Time: 06-24-19 @ 04:26

## 2019-06-25 DIAGNOSIS — R41.89 OTHER SYMPTOMS AND SIGNS INVOLVING COGNITIVE FUNCTIONS AND AWARENESS: ICD-10-CM

## 2019-06-25 LAB
ANION GAP SERPL CALC-SCNC: 11 MMOL/L — SIGNIFICANT CHANGE UP (ref 7–14)
BASOPHILS # BLD AUTO: 0.03 K/UL — SIGNIFICANT CHANGE UP (ref 0–0.2)
BASOPHILS NFR BLD AUTO: 0.3 % — SIGNIFICANT CHANGE UP (ref 0–1)
BUN SERPL-MCNC: 32 MG/DL — HIGH (ref 10–20)
CALCIUM SERPL-MCNC: 7.1 MG/DL — LOW (ref 8.5–10.1)
CHLORIDE SERPL-SCNC: 116 MMOL/L — HIGH (ref 98–110)
CO2 SERPL-SCNC: 19 MMOL/L — SIGNIFICANT CHANGE UP (ref 17–32)
CREAT SERPL-MCNC: 0.9 MG/DL — SIGNIFICANT CHANGE UP (ref 0.7–1.5)
EOSINOPHIL # BLD AUTO: 0.28 K/UL — SIGNIFICANT CHANGE UP (ref 0–0.7)
EOSINOPHIL NFR BLD AUTO: 2.8 % — SIGNIFICANT CHANGE UP (ref 0–8)
GLUCOSE SERPL-MCNC: 80 MG/DL — SIGNIFICANT CHANGE UP (ref 70–99)
HCT VFR BLD CALC: 24.8 % — LOW (ref 37–47)
HGB BLD-MCNC: 7.8 G/DL — LOW (ref 12–16)
IMM GRANULOCYTES NFR BLD AUTO: 0.9 % — HIGH (ref 0.1–0.3)
LYMPHOCYTES # BLD AUTO: 2.06 K/UL — SIGNIFICANT CHANGE UP (ref 1.2–3.4)
LYMPHOCYTES # BLD AUTO: 20.9 % — SIGNIFICANT CHANGE UP (ref 20.5–51.1)
MCHC RBC-ENTMCNC: 31.3 PG — HIGH (ref 27–31)
MCHC RBC-ENTMCNC: 31.5 G/DL — LOW (ref 32–37)
MCV RBC AUTO: 99.6 FL — HIGH (ref 81–99)
MONOCYTES # BLD AUTO: 0.75 K/UL — HIGH (ref 0.1–0.6)
MONOCYTES NFR BLD AUTO: 7.6 % — SIGNIFICANT CHANGE UP (ref 1.7–9.3)
NEUTROPHILS # BLD AUTO: 6.65 K/UL — HIGH (ref 1.4–6.5)
NEUTROPHILS NFR BLD AUTO: 67.5 % — SIGNIFICANT CHANGE UP (ref 42.2–75.2)
NRBC # BLD: 0 /100 WBCS — SIGNIFICANT CHANGE UP (ref 0–0)
PLATELET # BLD AUTO: 104 K/UL — LOW (ref 130–400)
POTASSIUM SERPL-MCNC: 4.4 MMOL/L — SIGNIFICANT CHANGE UP (ref 3.5–5)
POTASSIUM SERPL-SCNC: 4.4 MMOL/L — SIGNIFICANT CHANGE UP (ref 3.5–5)
RBC # BLD: 2.49 M/UL — LOW (ref 4.2–5.4)
RBC # FLD: 13.9 % — SIGNIFICANT CHANGE UP (ref 11.5–14.5)
SODIUM SERPL-SCNC: 146 MMOL/L — SIGNIFICANT CHANGE UP (ref 135–146)
WBC # BLD: 9.86 K/UL — SIGNIFICANT CHANGE UP (ref 4.8–10.8)
WBC # FLD AUTO: 9.86 K/UL — SIGNIFICANT CHANGE UP (ref 4.8–10.8)

## 2019-06-25 PROCEDURE — 99222 1ST HOSP IP/OBS MODERATE 55: CPT

## 2019-06-25 PROCEDURE — 99232 SBSQ HOSP IP/OBS MODERATE 35: CPT

## 2019-06-25 PROCEDURE — 70551 MRI BRAIN STEM W/O DYE: CPT | Mod: 26

## 2019-06-25 PROCEDURE — 99231 SBSQ HOSP IP/OBS SF/LOW 25: CPT

## 2019-06-25 RX ADMIN — DONEPEZIL HYDROCHLORIDE 10 MILLIGRAM(S): 10 TABLET, FILM COATED ORAL at 21:35

## 2019-06-25 RX ADMIN — HALOPERIDOL DECANOATE 1 MILLIGRAM(S): 100 INJECTION INTRAMUSCULAR at 21:35

## 2019-06-25 RX ADMIN — MEMANTINE HYDROCHLORIDE 10 MILLIGRAM(S): 10 TABLET ORAL at 05:01

## 2019-06-25 RX ADMIN — Medication 650 MILLIGRAM(S): at 11:30

## 2019-06-25 RX ADMIN — HEPARIN SODIUM 5000 UNIT(S): 5000 INJECTION INTRAVENOUS; SUBCUTANEOUS at 13:07

## 2019-06-25 RX ADMIN — MEMANTINE HYDROCHLORIDE 10 MILLIGRAM(S): 10 TABLET ORAL at 17:15

## 2019-06-25 RX ADMIN — HEPARIN SODIUM 5000 UNIT(S): 5000 INJECTION INTRAVENOUS; SUBCUTANEOUS at 21:35

## 2019-06-25 RX ADMIN — Medication 81 MILLIGRAM(S): at 11:30

## 2019-06-25 RX ADMIN — Medication 650 MILLIGRAM(S): at 05:02

## 2019-06-25 RX ADMIN — HEPARIN SODIUM 5000 UNIT(S): 5000 INJECTION INTRAVENOUS; SUBCUTANEOUS at 05:02

## 2019-06-25 RX ADMIN — Medication 650 MILLIGRAM(S): at 17:15

## 2019-06-25 RX ADMIN — HALOPERIDOL DECANOATE 1 MILLIGRAM(S): 100 INJECTION INTRAMUSCULAR at 21:37

## 2019-06-25 RX ADMIN — Medication 650 MILLIGRAM(S): at 17:55

## 2019-06-25 RX ADMIN — PANTOPRAZOLE SODIUM 40 MILLIGRAM(S): 20 TABLET, DELAYED RELEASE ORAL at 08:43

## 2019-06-25 RX ADMIN — Medication 650 MILLIGRAM(S): at 12:15

## 2019-06-25 RX ADMIN — LOSARTAN POTASSIUM 25 MILLIGRAM(S): 100 TABLET, FILM COATED ORAL at 05:02

## 2019-06-25 RX ADMIN — ATORVASTATIN CALCIUM 20 MILLIGRAM(S): 80 TABLET, FILM COATED ORAL at 21:35

## 2019-06-25 NOTE — PROGRESS NOTE ADULT - SUBJECTIVE AND OBJECTIVE BOX
SUBJECTIVE:    89F w/ PMHx of  HTN, DLD, dementia admitted for s/p fall found to have  Acute fractures of the left superior and inferior pubic rami, Lt distal radius fracture and splinting was doen by ortho to her wrist had an episode of unresponsiveness on 6/23 and transfered, CT head negative from surgery to medicine for further work up for unresponsiveness and r/o seizure.    Currently admitted to medicine with the primary diagnosis of Pubic ramus fracture     Today is hospital day 4d. no events overnight    PAST MEDICAL & SURGICAL HISTORY  Hyperlipidemia  Hypertension  Constipation  TIA (transient ischemic attack)  Urinary retention  No significant past surgical history    SOCIAL HISTORY:  Negative for smoking/alcohol/drug use.     ALLERGIES:  No Known Allergies    MEDICATIONS:  STANDING MEDICATIONS  acetaminophen   Tablet .. 650 milliGRAM(s) Oral every 6 hours  aspirin  chewable 81 milliGRAM(s) Oral daily  atorvastatin 20 milliGRAM(s) Oral at bedtime  chlorhexidine 4% Liquid 1 Application(s) Topical <User Schedule>  donepezil 10 milliGRAM(s) Oral at bedtime  haloperidol    Concentrate 1 milliGRAM(s) Oral at bedtime  heparin  Injectable 5000 Unit(s) SubCutaneous every 8 hours  losartan 25 milliGRAM(s) Oral daily  memantine 10 milliGRAM(s) Oral two times a day  pantoprazole    Tablet 40 milliGRAM(s) Oral before breakfast  sodium chloride 0.9%. 1000 milliLiter(s) IV Continuous <Continuous>    PRN MEDICATIONS  haloperidol    Concentrate 1 milliGRAM(s) Oral every 6 hours PRN    VITALS:   T(F): 96.9  HR: 71  BP: 115/55  RR: 18  SpO2: --    LABS:                        7.8    9.86  )-----------( 104      ( 25 Jun 2019 06:28 )             24.8     06-25    146  |  116<H>  |  32<H>  ----------------------------<  80  4.4   |  19  |  0.9    Ca    7.1<L>      25 Jun 2019 06:28  Phos  2.6     06-24  Mg     2.2     06-24      PT/INR - ( 23 Jun 2019 16:20 )   PT: 11.70 sec;   INR: 1.02 ratio         PTT - ( 23 Jun 2019 16:20 )  PTT:36.1 sec    ABG - ( 23 Jun 2019 16:06 )  pH, Arterial: 7.49  pH, Blood: x     /  pCO2: 35    /  pO2: 79    / HCO3: 27    / Base Excess: 3.2   /  SaO2: 97                    CARDIAC MARKERS ( 23 Jun 2019 16:20 )  x     / <0.01 ng/mL / 224 U/L / x     / 4.7 ng/mL      RADIOLOGY:    PHYSICAL EXAM: SUBJECTIVE:    89F w/ PMHx of  HTN, DLD, dementia admitted for s/p fall found to have  Acute fractures of the left superior and inferior pubic rami, Lt distal radius fracture and splinting was doen by ortho to her wrist had an episode of unresponsiveness on 6/23 and transfered, CT head negative from surgery to medicine for further work up for unresponsiveness and r/o seizure.    Currently admitted to medicine with the primary diagnosis of Pubic ramus fracture     Today is hospital day 4d. no events overnight    PAST MEDICAL & SURGICAL HISTORY  Hyperlipidemia  Hypertension  Constipation  TIA (transient ischemic attack)  Urinary retention  No significant past surgical history    SOCIAL HISTORY:  Negative for smoking/alcohol/drug use.     ALLERGIES:  No Known Allergies    MEDICATIONS:  STANDING MEDICATIONS  acetaminophen   Tablet .. 650 milliGRAM(s) Oral every 6 hours  aspirin  chewable 81 milliGRAM(s) Oral daily  atorvastatin 20 milliGRAM(s) Oral at bedtime  chlorhexidine 4% Liquid 1 Application(s) Topical <User Schedule>  donepezil 10 milliGRAM(s) Oral at bedtime  haloperidol    Concentrate 1 milliGRAM(s) Oral at bedtime  heparin  Injectable 5000 Unit(s) SubCutaneous every 8 hours  losartan 25 milliGRAM(s) Oral daily  memantine 10 milliGRAM(s) Oral two times a day  pantoprazole    Tablet 40 milliGRAM(s) Oral before breakfast  sodium chloride 0.9%. 1000 milliLiter(s) IV Continuous <Continuous>    PRN MEDICATIONS  haloperidol    Concentrate 1 milliGRAM(s) Oral every 6 hours PRN    VITALS:   T(F): 96.9  HR: 71  BP: 115/55  RR: 18  SpO2: --    LABS:                        7.8    9.86  )-----------( 104      ( 25 Jun 2019 06:28 )             24.8     06-25    146  |  116<H>  |  32<H>  ----------------------------<  80  4.4   |  19  |  0.9    Ca    7.1<L>      25 Jun 2019 06:28  Phos  2.6     06-24  Mg     2.2     06-24      PT/INR - ( 23 Jun 2019 16:20 )   PT: 11.70 sec;   INR: 1.02 ratio         PTT - ( 23 Jun 2019 16:20 )  PTT:36.1 sec    ABG - ( 23 Jun 2019 16:06 )  pH, Arterial: 7.49  pH, Blood: x     /  pCO2: 35    /  pO2: 79    / HCO3: 27    / Base Excess: 3.2   /  SaO2: 97                    CARDIAC MARKERS ( 23 Jun 2019 16:20 )  x     / <0.01 ng/mL / 224 U/L / x     / 4.7 ng/mL      RADIOLOGY:    PHYSICAL EXAM:  GEN: No acute distress  LUNGS: Clear to auscultation bilaterally   HEART: S1/S2 present. RRR.   ABD: Soft, non-tender, non-distended. Bowel sounds present  EXT: No edema  NEURO: couldn't assess

## 2019-06-25 NOTE — PROGRESS NOTE ADULT - SUBJECTIVE AND OBJECTIVE BOX
89yFemale with diagnosis: PUBIC RAMUS FRACTURE;RADIUS AND ULNA DISTAL FRACTURE;FALL,INITIAL ENCOUNTE      Patient seen,  sitting in chair, no significant change in status. No noted pain.       PHYSICAL EXAM  unchanged    T(C): , Max: 36.9 (00:00)  T(F): 96.9  HR: 71 (65 - 84)  BP: 115/55 (100/44 - 139/64)  RR: 18 (18 - 18)  SpO2: --              LABS:                          7.8    9.86  )-----------( 104      ( 25 Jun 2019 06:28 )             24.8                                                                                      06-25    146  |  116<H>  |  32<H>  ----------------------------<  80  4.4   |  19  |  0.9    Ca    7.1<L>      25 Jun 2019 06:28  Phos  2.6     06-24  Mg     2.2     06-24                                                        MEDICATIONS  (STANDING):  acetaminophen   Tablet .. 650 milliGRAM(s) Oral every 6 hours  aspirin  chewable 81 milliGRAM(s) Oral daily  atorvastatin 20 milliGRAM(s) Oral at bedtime  chlorhexidine 4% Liquid 1 Application(s) Topical <User Schedule>  donepezil 10 milliGRAM(s) Oral at bedtime  haloperidol    Concentrate 1 milliGRAM(s) Oral at bedtime  heparin  Injectable 5000 Unit(s) SubCutaneous every 8 hours  losartan 25 milliGRAM(s) Oral daily  memantine 10 milliGRAM(s) Oral two times a day  pantoprazole    Tablet 40 milliGRAM(s) Oral before breakfast    MEDICATIONS  (PRN):  haloperidol    Concentrate 1 milliGRAM(s) Oral every 6 hours PRN agitation

## 2019-06-25 NOTE — PROGRESS NOTE ADULT - ASSESSMENT
89F w/ PMHx of  HTN, DLD dementia admitted for s/p fall found to have  Acute fractures of the left superior and inferior pubic rami, Lt distal radius fracture and splinting was doen by ortho to her wrist had an episode of unresponsiveness on 6/23 and transfered, CT head negative from surgery to medicine for further work up for unresponsiveness and r/o seizure.     #)S/p fall and acute fracture of the left superior and inferior pubic rami, Lt distal radius fracture s/p splint for wrist  -no surgical intervention was done by ortho  -Pain control  -NWB on LUE  -PWB on LLE  -FOllow up with ortho as an OP    #)Episode of unresponsiveness r/o seizure  -following up with neuro  -CT scan of head negative  -EEG negative for seizure  -MRI pending     #)Acute on chronic anemia (Macrocytic)  -baseline 11-12  -current Hb 7.8  -No evidence of active bleed  -check B12, folate, iron studies  -active type and screen transfuse <8    #)HTN  -c/w losartan    #)Dementia  -c/w donepezil and memantine    #)Diet: DASH  #)Activtity: Ambulate with assistance  #)DVT ppx: Hep SC  #)GI ppx: Protonix  #)Dispo; Physiatry recommended SNF  #)Code; DNR/DNI 89F w/ PMHx of  HTN, DLD dementia admitted for s/p fall found to have  Acute fractures of the left superior and inferior pubic rami, Lt distal radius fracture and splinting was doen by ortho to her wrist had an episode of unresponsiveness on 6/23 and transfered, CT head negative from surgery to medicine for further work up for unresponsiveness and r/o seizure.     #)S/p fall and acute fracture of the left superior and inferior pubic rami, Lt distal radius fracture s/p splint for wrist  -no surgical intervention was done by ortho  -Pain control  -NWB on LUE  -PWB on LLE  -FOllow up with ortho as an OP    #)Episode of unresponsiveness r/o seizure  -following up with neuro  -CT scan of head negative  -EEG negative for seizure  -MRI pending     #)Acute on chronic anemia (Macrocytic)  -baseline 11-12  -current Hb 7.8  -No evidence of active bleed  -check B12, folate, iron studies  -active type and screen transfuse <8    #)HTN  -c/w losartan    #)Dementia  -c/w donepezil and memantine    #)DLD  c/w lipitor    #)Diet: DASH  #)Activtity: Ambulate with assistance  #)DVT ppx: Hep SC  #)GI ppx: Protonix  #)Dispo; Physiatry recommended SNF  #)Code; DNR/DNI

## 2019-06-25 NOTE — DISCHARGE NOTE PROVIDER - CARE PROVIDER_API CALL
van pratt  PMCHLOE  Phone: (   )    -  Fax: (   )    -  Follow Up Time:     Jose Armando Siddiqi (MD)  Orthopaedic Surgery  [address] [place] [id number]  Phone: [phone]  Fax: [phone]  Follow Up Time:

## 2019-06-25 NOTE — DISCHARGE NOTE NURSING/CASE MANAGEMENT/SOCIAL WORK - NSDCDPATPORTLINK_GEN_ALL_CORE
You can access the [facility] Patient Portal, offered by [facility], by registering with the following website: [email or web address]

## 2019-06-25 NOTE — DISCHARGE NOTE PROVIDER - HOSPITAL COURSE
89yo F with Past Medical History HTN, Hypercholestremia, and Dementia admitted status post fall complicated by acute fractures of the left superior and inferior pubic ramus, as well as left distal radius fracture.  Status post splinting by orthopedic surgery.  Her clinical course was complicated by an episode of unresponsiveness on 6/23/19 rule out seizure work up was done EEG negative for seizures MRI negative for any acute pathology stable to be d/trace to home. 89yo F with Past Medical History HTN, Hypercholestremia, and Dementia admitted status post fall complicated by acute fractures of the left superior and inferior pubic ramus, as well as left distal radius fracture.  Status post splinting by orthopedic surgery.  Her clinical course was complicated by an episode of unresponsiveness on 6/23/19 rule out seizure work up was done EEG negative for seizures MRI negative for any acute pathology also hospital course complicated by JAIDA due to urinary retention bladder scan showed >900 ml of urine straight cath was done overnight she is not able to urinate and nuñez was placed for retention and creatinine is improved daughter wants to take him for SNF. 89yo F with Past Medical History HTN, Hypercholestremia, and Dementia admitted status post fall complicated by acute fractures of the left superior and inferior pubic ramus, as well as left distal radius fracture.  Status post splinting by orthopedic surgery.  Her clinical course was complicated by an episode of unresponsiveness on 6/23/19 rule out seizure work up was done EEG negative for seizures MRI negative for any acute pathology also hospital course complicated by JAIDA due to urinary retention bladder scan showed >900 ml of urine straight cath was done overnight she is not able to urinate and nuñez was placed for retention and creatinine is improved also developed UTI started on antibiotic ciprofloxacin daughter wants to take her for SNF. 89yo F with Past Medical History HTN, Hypercholestremia, and Dementia admitted status post fall complicated by acute fractures of the left superior and inferior pubic ramus, as well as left distal radius fracture.  Status post splinting by orthopedic surgery.  Her clinical course was complicated by an episode of unresponsiveness on 6/23/19 rule out seizure work up was done EEG negative for seizures MRI negative for any acute pathology also hospital course complicated by JAIDA due to urinary retention bladder scan showed >900 ml of urine straight cath was done overnight she is not able to urinate and nuñez was placed for retention and creatinine is improved also developed UTI started on antibiotic Rocephin after positive UCx for E coli & Morganella Morganii sampson sensitive and hematuria required 2 units of pRBCs. Hg stable. After consulting urology nuñez discontinued. Will do intermittent cath every 8 hours. Changed Rocephin to P.O. Vantin to be continued for 5 more days, ends on July 5, 2019 89yo F with Past Medical History HTN, Hypercholestremia, and Dementia admitted status post fall complicated by acute fractures of the left superior and inferior pubic ramus, as well as left distal radius fracture.  Status post splinting by orthopedic surgery.  Her clinical course was complicated by an episode of unresponsiveness on 6/23/19 rule out seizure work up was done EEG negative for seizures MRI negative for any acute pathology also hospital course complicated by JAIDA due to urinary retention bladder scan showed >900 ml of urine straight cath was done overnight she is not able to urinate and nuñez was placed for retention and creatinine is improved also developed UTI started on antibiotic Rocephin after positive UCx for E coli & Morganella Morganii sampson sensitive and hematuria required 2 units of pRBCs. Hg stable. After consulting urology nuñez discontinued. Will do intermittent cath every 8 hours. Changed Rocephin to P.O. Vantin to be continued for 5 more days, ends on July 5, 2019    MEDREC discussed with attending 89yo F with Past Medical History HTN, Hypercholestremia, and Dementia admitted status post fall complicated by acute fractures of the left superior and inferior pubic ramus, as well as left distal radius fracture.  Status post splinting by orthopedic surgery.  Her clinical course was complicated by an episode of unresponsiveness on 6/23/19 rule out seizure work up was done EEG negative for seizures MRI negative for any acute pathology also hospital course complicated by JAIDA due to urinary retention bladder scan showed >900 ml of urine straight cath was done overnight she is not able to urinate and nuñez was placed for retention and creatinine is improved also developed UTI started on antibiotic Rocephin after positive UCx for E coli & Morganella Morganii sampson sensitive and hematuria required 2 units of pRBCs. Hg stable. After consulting urology nuñez discontinued. Will do intermittent cath every 8 hours. Changed Rocephin to P.O. Vantin to be continued for 5 more days, ends on July 5, 2019, we stopped ativan and trazadone in the hospital.    MEDREC discussed with attending

## 2019-06-25 NOTE — PROGRESS NOTE ADULT - SUBJECTIVE AND OBJECTIVE BOX
GARETH CUEVAS MRN-[id number]    Hospitalist Note  89yo F with Past Medical History HTN, Hypercholestremia, and Dementia admitted status post fall complicated by acute fractures of the left superior and inferior pubic ramus, as well as left distal radius fracture.  Status post splinting by orthopedic surgery.  Her clinical course was complicated by an episode of unresponsiveness on 6/23/19 rule out seizure.    Overnight events/Updates:  Routine EEG was negative for seizure activity while MRI Brain was negative for acute hemorrhage or mass effect.  The patient has developed worsening anemia over the past 72 hours.  No  hematochezia or melena reported by nursing.    Vital Signs Last 24 Hrs  T(C): 36.1 (25 Jun 2019 07:40), Max: 36.9 (25 Jun 2019 00:00)  T(F): 96.9 (25 Jun 2019 07:40), Max: 98.5 (25 Jun 2019 00:00).   HR: 71 (25 Jun 2019 07:40) (65 - 84)  BP: 115/55 (25 Jun 2019 07:40) (100/44 - 139/64)  BP(mean): --  RR: 18 (25 Jun 2019 07:40) (18 - 18)  SpO2: --    Physical Examination:  General: AAO x 2  HEENT: PERRLA, EOMI  CV= S1 & S2 appreciated  Lungs= CTA BL  Abdominal Examination= + BS, Soft, NT/ND  Extremity Examination= + splint to the left upper extremity    ROS: No chest pain, no shortness of breath.  All other systems reviewed and are within normal limits except for the complaints in the HPI.    MEDICATIONS  (STANDING):  acetaminophen   Tablet .. 650 milliGRAM(s) Oral every 6 hours  aspirin  chewable 81 milliGRAM(s) Oral daily  atorvastatin 20 milliGRAM(s) Oral at bedtime  chlorhexidine 4% Liquid 1 Application(s) Topical <User Schedule>  donepezil 10 milliGRAM(s) Oral at bedtime  haloperidol    Concentrate 1 milliGRAM(s) Oral at bedtime  heparin  Injectable 5000 Unit(s) SubCutaneous every 8 hours  losartan 25 milliGRAM(s) Oral daily  memantine 10 milliGRAM(s) Oral two times a day  pantoprazole    Tablet 40 milliGRAM(s) Oral before breakfast    MEDICATIONS  (PRN):  haloperidol    Concentrate 1 milliGRAM(s) Oral every 6 hours PRN agitation                            7.8    9.86  )-----------( 104      ( 25 Jun 2019 06:28 )             24.8     06-25    146  |  116<H>  |  32<H>  ----------------------------<  80  4.4   |  19  |  0.9    Ca    7.1<L>      25 Jun 2019 06:28  Phos  2.6     06-24  Mg     2.2     06-24        Case discussed with housestaff & family  TAWANDA Del Cid [id number]

## 2019-06-25 NOTE — PROGRESS NOTE ADULT - ASSESSMENT
89yFemale being evaluated for goals of care.    Paint and agitation well controlled.   MRI and EEG appear unremarkable.  Awaiting Neuro f/u  ?dc planning- home care vs Hospice (daughter considering no further re-hospitalizations)    Recommendations:  Ongoing med mngmnt  Neuro to follow   Haldol as written       We will follow  [id number]

## 2019-06-25 NOTE — PROGRESS NOTE ADULT - ASSESSMENT
89yo F with Past Medical History HTN, Hypercholestremia, and Dementia admitted status post fall complicated by acute fractures of the left superior and inferior pubic ramus, as well as left distal radius fracture.     Fall complicated by superior/inferior pubic ramus fracture & left distal radius fracture: status post splinting to the left upper extremity.  Her pain appears to be well controlled today on Tylenol PRN.  Encourage participation with physical therapy.  Acute blood loss anemia: hemoglobin decreased to 7.8 from 8.2.  Repeat CBC in AM; maintain active type and screen.  Follow-up results from iron studies, Vitamin b12, and folate levels.  AMS rule out seizures: patient vice transferred from Surgery to the Medical Service for workup of altered mental status.  Routine EEG was negative for seizures.  MRI revealed significant volume loss.  Hypertension: continue Losartan 25mg PO q24  Hypercholesteremia: continue Lipitor 20mg QHS  Dementia: continue Memantine and Donepezil as directed  GI/DVT prophylaxis    #Progress Note Handoff    Pending:  Tests: Monitor CBC for worsening anemia  Future Disposition: SNF placement; family in agreement

## 2019-06-25 NOTE — DISCHARGE NOTE PROVIDER - NSDCCPCAREPLAN_GEN_ALL_CORE_FT
PRINCIPAL DISCHARGE DIAGNOSIS  Diagnosis: Pubic ramus fracture  Assessment and Plan of Treatment: no surgical intervention as per ortho      SECONDARY DISCHARGE DIAGNOSES  Diagnosis: Episode of unresponsiveness  Assessment and Plan of Treatment: seizure ruled out didn't find any reason for her unresponsiveness EEG negative and MRI negative.    Diagnosis: Radius and ulna distal fracture  Assessment and Plan of Treatment: s/p splint to wrist please follow up with ortho as an OP in 1-2 weeks PRINCIPAL DISCHARGE DIAGNOSIS  Diagnosis: Pubic ramus fracture  Assessment and Plan of Treatment: no surgical intervention as per ortho      SECONDARY DISCHARGE DIAGNOSES  Diagnosis: Acute kidney injury  Assessment and Plan of Treatment: due to urinary retention improved after placing nuñez please continue with nuñez for now and reassess in 1 week whether she can void or not    Diagnosis: Episode of unresponsiveness  Assessment and Plan of Treatment: seizure ruled out didn't find any reason for her unresponsiveness EEG negative and MRI negative.    Diagnosis: Radius and ulna distal fracture  Assessment and Plan of Treatment: s/p splint to wrist please follow up with ortho as an OP in 1-2 weeks PRINCIPAL DISCHARGE DIAGNOSIS  Diagnosis: Pubic ramus fracture  Assessment and Plan of Treatment: no surgical intervention as per ortho      SECONDARY DISCHARGE DIAGNOSES  Diagnosis: Acute UTI  Assessment and Plan of Treatment: you were found to have urinary tract infection please take ciprofloxacin for total of 5 days and please stop on 7/2/2019.    Diagnosis: Acute kidney injury  Assessment and Plan of Treatment: due to urinary retention improved after placing nuñez please continue with nuñez for now and reassess in 1 week whether she can void or not    Diagnosis: Episode of unresponsiveness  Assessment and Plan of Treatment: seizure ruled out didn't find any reason for her unresponsiveness EEG negative and MRI negative.    Diagnosis: Radius and ulna distal fracture  Assessment and Plan of Treatment: s/p splint to wrist please follow up with ortho as an OP in 1-2 weeks PRINCIPAL DISCHARGE DIAGNOSIS  Diagnosis: Pubic ramus fracture  Assessment and Plan of Treatment: no surgical intervention as per ortho      SECONDARY DISCHARGE DIAGNOSES  Diagnosis: Urinary tract infection with hematuria  Assessment and Plan of Treatment: Blood in the urine is likely due to infection. After 2 units of blood transfusion Hemoglobin was stable. Hematuria resolved. Please check hemoglobin within 1-2 weeks.    Diagnosis: Acute UTI  Assessment and Plan of Treatment: you were found to have urinary tract infection. Please take Vantin for 5 days total. Please stop on 7/8/19    Diagnosis: Acute kidney injury  Assessment and Plan of Treatment: due to urinary retention improved after placing nuñez. Nuñez was removed. Continue with intermittent catherization every 8 hours per urology    Diagnosis: Episode of unresponsiveness  Assessment and Plan of Treatment: seizure ruled out didn't find any reason for her unresponsiveness EEG negative and MRI negative.    Diagnosis: Radius and ulna distal fracture  Assessment and Plan of Treatment: s/p splint to wrist please follow up with ortho as an OP in 1-2 weeks

## 2019-06-26 LAB
ANION GAP SERPL CALC-SCNC: 11 MMOL/L — SIGNIFICANT CHANGE UP (ref 7–14)
BASOPHILS # BLD AUTO: 0.05 K/UL — SIGNIFICANT CHANGE UP (ref 0–0.2)
BASOPHILS NFR BLD AUTO: 0.4 % — SIGNIFICANT CHANGE UP (ref 0–1)
BUN SERPL-MCNC: 58 MG/DL — HIGH (ref 10–20)
CALCIUM SERPL-MCNC: 8.6 MG/DL — SIGNIFICANT CHANGE UP (ref 8.5–10.1)
CHLORIDE SERPL-SCNC: 112 MMOL/L — HIGH (ref 98–110)
CO2 SERPL-SCNC: 22 MMOL/L — SIGNIFICANT CHANGE UP (ref 17–32)
CREAT SERPL-MCNC: 2.1 MG/DL — HIGH (ref 0.7–1.5)
EOSINOPHIL # BLD AUTO: 0.11 K/UL — SIGNIFICANT CHANGE UP (ref 0–0.7)
EOSINOPHIL NFR BLD AUTO: 0.9 % — SIGNIFICANT CHANGE UP (ref 0–8)
FOLATE SERPL-MCNC: 6.4 NG/ML — SIGNIFICANT CHANGE UP
GLUCOSE SERPL-MCNC: 115 MG/DL — HIGH (ref 70–99)
HCT VFR BLD CALC: 25.1 % — LOW (ref 37–47)
HGB BLD-MCNC: 8 G/DL — LOW (ref 12–16)
IMM GRANULOCYTES NFR BLD AUTO: 1.2 % — HIGH (ref 0.1–0.3)
LYMPHOCYTES # BLD AUTO: 17.4 % — LOW (ref 20.5–51.1)
LYMPHOCYTES # BLD AUTO: 2.11 K/UL — SIGNIFICANT CHANGE UP (ref 1.2–3.4)
MAGNESIUM SERPL-MCNC: 2.1 MG/DL — SIGNIFICANT CHANGE UP (ref 1.8–2.4)
MCHC RBC-ENTMCNC: 31.3 PG — HIGH (ref 27–31)
MCHC RBC-ENTMCNC: 31.9 G/DL — LOW (ref 32–37)
MCV RBC AUTO: 98 FL — SIGNIFICANT CHANGE UP (ref 81–99)
MONOCYTES # BLD AUTO: 0.81 K/UL — HIGH (ref 0.1–0.6)
MONOCYTES NFR BLD AUTO: 6.7 % — SIGNIFICANT CHANGE UP (ref 1.7–9.3)
NEUTROPHILS # BLD AUTO: 8.88 K/UL — HIGH (ref 1.4–6.5)
NEUTROPHILS NFR BLD AUTO: 73.4 % — SIGNIFICANT CHANGE UP (ref 42.2–75.2)
NRBC # BLD: 0 /100 WBCS — SIGNIFICANT CHANGE UP (ref 0–0)
PLATELET # BLD AUTO: 169 K/UL — SIGNIFICANT CHANGE UP (ref 130–400)
POTASSIUM SERPL-MCNC: 4.9 MMOL/L — SIGNIFICANT CHANGE UP (ref 3.5–5)
POTASSIUM SERPL-SCNC: 4.9 MMOL/L — SIGNIFICANT CHANGE UP (ref 3.5–5)
RBC # BLD: 2.56 M/UL — LOW (ref 4.2–5.4)
RBC # FLD: 14.2 % — SIGNIFICANT CHANGE UP (ref 11.5–14.5)
SODIUM SERPL-SCNC: 145 MMOL/L — SIGNIFICANT CHANGE UP (ref 135–146)
VIT B12 SERPL-MCNC: 578 PG/ML — SIGNIFICANT CHANGE UP (ref 232–1245)
WBC # BLD: 12.1 K/UL — HIGH (ref 4.8–10.8)
WBC # FLD AUTO: 12.1 K/UL — HIGH (ref 4.8–10.8)

## 2019-06-26 PROCEDURE — 99232 SBSQ HOSP IP/OBS MODERATE 35: CPT

## 2019-06-26 RX ADMIN — ATORVASTATIN CALCIUM 20 MILLIGRAM(S): 80 TABLET, FILM COATED ORAL at 21:19

## 2019-06-26 RX ADMIN — Medication 650 MILLIGRAM(S): at 17:20

## 2019-06-26 RX ADMIN — Medication 81 MILLIGRAM(S): at 11:02

## 2019-06-26 RX ADMIN — PANTOPRAZOLE SODIUM 40 MILLIGRAM(S): 20 TABLET, DELAYED RELEASE ORAL at 05:22

## 2019-06-26 RX ADMIN — DONEPEZIL HYDROCHLORIDE 10 MILLIGRAM(S): 10 TABLET, FILM COATED ORAL at 21:20

## 2019-06-26 RX ADMIN — Medication 650 MILLIGRAM(S): at 23:31

## 2019-06-26 RX ADMIN — Medication 650 MILLIGRAM(S): at 17:21

## 2019-06-26 RX ADMIN — LOSARTAN POTASSIUM 25 MILLIGRAM(S): 100 TABLET, FILM COATED ORAL at 05:22

## 2019-06-26 RX ADMIN — Medication 650 MILLIGRAM(S): at 11:02

## 2019-06-26 RX ADMIN — MEMANTINE HYDROCHLORIDE 10 MILLIGRAM(S): 10 TABLET ORAL at 05:22

## 2019-06-26 RX ADMIN — MEMANTINE HYDROCHLORIDE 10 MILLIGRAM(S): 10 TABLET ORAL at 17:20

## 2019-06-26 RX ADMIN — HALOPERIDOL DECANOATE 1 MILLIGRAM(S): 100 INJECTION INTRAMUSCULAR at 21:21

## 2019-06-26 RX ADMIN — Medication 650 MILLIGRAM(S): at 05:22

## 2019-06-26 RX ADMIN — HEPARIN SODIUM 5000 UNIT(S): 5000 INJECTION INTRAVENOUS; SUBCUTANEOUS at 21:19

## 2019-06-26 RX ADMIN — HEPARIN SODIUM 5000 UNIT(S): 5000 INJECTION INTRAVENOUS; SUBCUTANEOUS at 14:18

## 2019-06-26 RX ADMIN — HEPARIN SODIUM 5000 UNIT(S): 5000 INJECTION INTRAVENOUS; SUBCUTANEOUS at 05:22

## 2019-06-26 NOTE — DIETITIAN INITIAL EVALUATION ADULT. - PATIENT FOLLOWED BY PALLIATIVE CARE
Pt O2 reading 87-88%. RN placed pt on 2L NC. O2 reading @93%. Pt resting quietly. Statement Selected

## 2019-06-26 NOTE — PROGRESS NOTE ADULT - ASSESSMENT
89yo F with Past Medical History HTN, Hypercholestremia, and Dementia admitted status post fall complicated by acute fractures of the left superior and inferior pubic ramus, as well as left distal radius fracture.     Fall complicated by superior/inferior pubic ramus fracture & left distal radius fracture: status post splinting to the left upper extremity.  No new complaints.  Continue Tylenol PRN for pain.  Acute kidney injury: secondary to urinary obstruction.  Status post nuñez catheter placement.  Repeat BMP in AM  Acute blood loss anemia: hemoglobin remains stable @ 8.  Acute blood loss likely related to multiple fractures.  AMS rule out seizures: patient vice transferred from Surgery to the Medical Service for workup of altered mental status.  Routine EEG was negative for seizures.  MRI revealed significant volume loss.  Hypertension: continue Losartan 25mg PO q24  Hypercholesteremia: continue Lipitor 20mg QHS  Dementia: continue Memantine and Donepezil as directed  GI/DVT prophylaxis    #Progress Note Handoff    Pending:  Tests: BMP in AM  Future Disposition: Anticipate discharge to SNF in 24 hours

## 2019-06-26 NOTE — DIETITIAN INITIAL EVALUATION ADULT. - OTHER INFO
Pt admitted status post fall complicated by acute fractures of the left superior and inferior pubic ramus, as well as left distal radius fracture. Routine EEG was negative for seizures.  MRI revealed significant volume loss. Awaiting Neuro f/u  ?dc planning- home care vs Hospice (daughter considering no further re-hospitalizations).

## 2019-06-26 NOTE — PROVIDER CONTACT NOTE (OTHER) - ACTION/TREATMENT ORDERED:
Will come to speak to family member as soon as possible. Will likely order trazadone as it is patient's home med
MD will place straight cath.
spoke with florencio trauma PA, she will f/u with resident Nithin regarding diet, and she will get back to us.
Ok will come

## 2019-06-26 NOTE — PROGRESS NOTE ADULT - SUBJECTIVE AND OBJECTIVE BOX
GARETH CUEVAS [id number]    Hospitalist Note  89yo F with Past Medical History HTN, Hypercholestremia, and Dementia admitted status post fall complicated by acute fractures of the left superior and inferior pubic ramus, as well as left distal radius fracture.  Status post splinting by orthopedic surgery.  Her clinical course was complicated by an episode of unresponsiveness on 6/23/19 rule out seizure.    Overnight events/Updates: Neurologic workup was negative for seizures.  Her discharge to SNF, however, was held due to acute kidney injury.  Creatinine increased to >2, which was likely due to urinary retention.  Status post nuñez catheter placement     Vital Signs Last 24 Hrs  T(C): 36.8 (26 Jun 2019 15:26), Max: 36.8 (26 Jun 2019 15:26)  T(F): 98.3 (26 Jun 2019 15:26), Max: 98.3 (26 Jun 2019 15:26)  HR: 93 (26 Jun 2019 15:26) (80 - 96)  BP: 102/49 (26 Jun 2019 15:26) (102/49 - 182/74)  BP(mean): --  RR: 18 (26 Jun 2019 15:26) (18 - 18)  SpO2: --    Physical Examination:  General: AAO x 2  HEENT: PERRLA, EOMI, hard of hearing  CV= S1 & S2 appreciated  Lungs= CTA BL  Abdominal Examination= + BS, Soft, NT/ND, + nuñez  Extremity Examination= No C/C/E    ROS: No chest pain, no shortness of breath.  All other systems reviewed and are within normal limits except for the complaints in the HPI.    MEDICATIONS  (STANDING):  acetaminophen   Tablet .. 650 milliGRAM(s) Oral every 6 hours  aspirin  chewable 81 milliGRAM(s) Oral daily  atorvastatin 20 milliGRAM(s) Oral at bedtime  chlorhexidine 4% Liquid 1 Application(s) Topical <User Schedule>  donepezil 10 milliGRAM(s) Oral at bedtime  haloperidol    Concentrate 1 milliGRAM(s) Oral at bedtime  heparin  Injectable 5000 Unit(s) SubCutaneous every 8 hours  losartan 25 milliGRAM(s) Oral daily  memantine 10 milliGRAM(s) Oral two times a day  pantoprazole    Tablet 40 milliGRAM(s) Oral before breakfast    MEDICATIONS  (PRN):  haloperidol    Concentrate 1 milliGRAM(s) Oral every 6 hours PRN agitation                            8.0    12.10 )-----------( 169      ( 26 Jun 2019 06:16 )             25.1     06-26    145  |  112<H>  |  58<H>  ----------------------------<  115<H>  4.9   |  22  |  2.1<H>    Ca    8.6      26 Jun 2019 06:16  Mg     2.1     06-26        Case discussed with housestaff & family  TAWANDA Del Cid [id number]

## 2019-06-26 NOTE — CHART NOTE - NSCHARTNOTEFT_GEN_A_CORE
Palliative Care Biopsychosocial Assessment:    Patient is a 89F w/ PMHx of  HTN, dementia presenting after trip & fall onto left side. Family witnessed fall forward and states patient caught herself on her hands. - HT, -  LOC, - AC. c/o  left hand pain and left hip pain.  Patient is alert, however, unable to communicate.  Assessment completed with caregiver daughter Natalia. Patient resides in a private home with Natalia who takes total care of patient.  Patient's daughter is having difficulty coping and needs support.   Palliative care consulted for overall goals of care.  Palliative care has been speaking with patients daughter and have introduced hospice care. Daughter opting for DNR/DNI and continue ongoing medical management. Patient's daughter is waiting for neuro and will then be ready to make a decision.  Supportive counseling provided. Palliative care team will continue to monitor and provide support.    Patient Coping Status:       [   ]   coping well        [   ]    coping with  some difficulty       [   ]   difficulty coping     [ x ]   other                                                       Patient Emotional Status:     [   ]   anxious         [   ]   depressed           [   ]  overwhelmed          [   ]   angry         [  x ]accepting       [   ]   not accepting           [   ]   other     Patient Mental Status:      [  x ]   alert              [   ]   oriented         [  x  ]   confused         [   ] lethargic         [   ]   non-responsive   [   ]   other     Advance Directives:     [ x  ]    Health Care Surrogate: Name:   Natalia Rojas                          [   ]    Health Care Proxy: Name:   [  x ]    FARAZST  [   ]    Living Will  [  x ]    DNR  [  x ]    DNI    Patient Needs:     [  x ]   Supportive Counseling                  [   ]   Family Meeting            [  x ]    Education                           [ x  ]   Advance Care Planning         Caregiver Name: Natalia Rojas   Caregiver needs:     [ x  ]   Supportive Counseling      [   ]   Family Conference      [ x  ]   Education    [   ]   Other     Referral:      [   ]   Community Resources         [   ]   Cancer Support Group     [  x ]    Hospice       [   ]   Bereavement support     [   ]   Pastoral Care      [   ]  [facility] [place]       [   ]  Child Life Services     [   ]   Other                    Spectra #: [phone]

## 2019-06-26 NOTE — PROGRESS NOTE ADULT - ASSESSMENT
89F w/ PMHx of  HTN, DLD dementia admitted for s/p fall found to have  Acute fractures of the left superior and inferior pubic rami, Lt distal radius fracture and splinting was doen by ortho to her wrist had an episode of unresponsiveness on 6/23 and transfered, CT head negative from surgery to medicine for further work up for unresponsiveness and r/o seizure.     #) JAIDA on CKD likely due to retention  -Bladder scan was doen showed 900ml of urine straight cath was done  -will follow up Creatinine    #)S/p fall and acute fracture of the left superior and inferior pubic rami, Lt distal radius fracture s/p splint for wrist  -no surgical intervention was done by ortho  -Pain control  -NWB on LUE  -PWB on LLE  -Follow up with ortho as an OP    #)Episode of unresponsiveness r/o seizure  -CT scan of head negative  -EEG negative for seizure  -MRI negative for any pathology    #)Acute on chronic anemia (Macrocytic)  -Hb stable    #)HTN  -c/w losartan    #)Dementia  -c/w donepezil and memantine    #)DLD  c/w lipitor    #)Diet: DASH  #)Activtity: Ambulate with assistance  #)DVT ppx: Hep SC  #)GI ppx: Protonix  #)Dispo; Home with home care likely today  #)Code; DNR/DNI

## 2019-06-26 NOTE — DIETITIAN INITIAL EVALUATION ADULT. - FEEDING SKILL
independent/Nutrition Hx PTA: pt with good appetite/po intake, generally consumes 2 meals daily + Ensure Enlive BID. NKFA. Currently, pt has a great appetite and is consuming 75% of her meal trays per daughter. Daughter requests for pt to receive Ensure as she drinks it at home.

## 2019-06-26 NOTE — DIETITIAN INITIAL EVALUATION ADULT. - ENERGY NEEDS
Calories: 2610-8814 kcal/day (MSJ x 1.2-1.3 AF)  Protein: 57-68 gm/day (1-1.2 gm/kg CBW)  Fluid: 1 ml/kcal

## 2019-06-26 NOTE — DIETITIAN INITIAL EVALUATION ADULT. - PHYSICAL APPEARANCE
well nourished/confused, disoriented. BMI: 23.7, IBW: 105#, UBW: ~114#, per daughter pt has been losing wt over the years but has been stable recently. 1+ edema to L hand, ecchymosis noted.

## 2019-06-26 NOTE — PROGRESS NOTE ADULT - SUBJECTIVE AND OBJECTIVE BOX
SUBJECTIVE:    Patient is a 89y old Female who presents with a chief complaint of s/p trip and fall (25 Jun 2019 15:45)    Currently admitted to medicine with the primary diagnosis of Pubic ramus fracture     Today is hospital day 5d. This morning she is resting comfortably in bed and reports no new issues or overnight events. Spoke with daughter at bedside they are still willing to take her home    PAST MEDICAL & SURGICAL HISTORY  Hyperlipidemia  Hypertension  Constipation  TIA (transient ischemic attack)  Urinary retention  No significant past surgical history    SOCIAL HISTORY:  Negative for smoking/alcohol/drug use.     ALLERGIES:  No Known Allergies    MEDICATIONS:  STANDING MEDICATIONS  acetaminophen   Tablet .. 650 milliGRAM(s) Oral every 6 hours  aspirin  chewable 81 milliGRAM(s) Oral daily  atorvastatin 20 milliGRAM(s) Oral at bedtime  chlorhexidine 4% Liquid 1 Application(s) Topical <User Schedule>  donepezil 10 milliGRAM(s) Oral at bedtime  haloperidol    Concentrate 1 milliGRAM(s) Oral at bedtime  heparin  Injectable 5000 Unit(s) SubCutaneous every 8 hours  losartan 25 milliGRAM(s) Oral daily  memantine 10 milliGRAM(s) Oral two times a day  pantoprazole    Tablet 40 milliGRAM(s) Oral before breakfast    PRN MEDICATIONS  haloperidol    Concentrate 1 milliGRAM(s) Oral every 6 hours PRN    VITALS:   T(F): 96.3  HR: 80  BP: 144/63  RR: 18  SpO2: --    LABS:                        8.0    12.10 )-----------( 169      ( 26 Jun 2019 06:16 )             25.1     06-26    145  |  112<H>  |  58<H>  ----------------------------<  115<H>  4.9   |  22  |  2.1<H>    Ca    8.6      26 Jun 2019 06:16  Mg     2.1     06-26                    RADIOLOGY:    PHYSICAL EXAM:  GEN: No acute distress  LUNGS: Clear to auscultation bilaterally   HEART: S1/S2 present. RRR.   ABD: Soft, non-tender, non-distended. Bowel sounds present  EXT: No edema  NEURO: couldn't assess

## 2019-06-27 LAB
ANION GAP SERPL CALC-SCNC: 12 MMOL/L — SIGNIFICANT CHANGE UP (ref 7–14)
BASOPHILS # BLD AUTO: 0.03 K/UL — SIGNIFICANT CHANGE UP (ref 0–0.2)
BASOPHILS NFR BLD AUTO: 0.2 % — SIGNIFICANT CHANGE UP (ref 0–1)
BUN SERPL-MCNC: 49 MG/DL — HIGH (ref 10–20)
CALCIUM SERPL-MCNC: 8.3 MG/DL — LOW (ref 8.5–10.1)
CHLORIDE SERPL-SCNC: 113 MMOL/L — HIGH (ref 98–110)
CO2 SERPL-SCNC: 20 MMOL/L — SIGNIFICANT CHANGE UP (ref 17–32)
CREAT SERPL-MCNC: 1.5 MG/DL — SIGNIFICANT CHANGE UP (ref 0.7–1.5)
EOSINOPHIL # BLD AUTO: 0.26 K/UL — SIGNIFICANT CHANGE UP (ref 0–0.7)
EOSINOPHIL NFR BLD AUTO: 2.1 % — SIGNIFICANT CHANGE UP (ref 0–8)
GLUCOSE SERPL-MCNC: 100 MG/DL — HIGH (ref 70–99)
HCT VFR BLD CALC: 23 % — LOW (ref 37–47)
HGB BLD-MCNC: 7.4 G/DL — LOW (ref 12–16)
IMM GRANULOCYTES NFR BLD AUTO: 1.3 % — HIGH (ref 0.1–0.3)
LYMPHOCYTES # BLD AUTO: 2.65 K/UL — SIGNIFICANT CHANGE UP (ref 1.2–3.4)
LYMPHOCYTES # BLD AUTO: 21.4 % — SIGNIFICANT CHANGE UP (ref 20.5–51.1)
MCHC RBC-ENTMCNC: 31.6 PG — HIGH (ref 27–31)
MCHC RBC-ENTMCNC: 32.2 G/DL — SIGNIFICANT CHANGE UP (ref 32–37)
MCV RBC AUTO: 98.3 FL — SIGNIFICANT CHANGE UP (ref 81–99)
MONOCYTES # BLD AUTO: 1.06 K/UL — HIGH (ref 0.1–0.6)
MONOCYTES NFR BLD AUTO: 8.6 % — SIGNIFICANT CHANGE UP (ref 1.7–9.3)
NEUTROPHILS # BLD AUTO: 8.2 K/UL — HIGH (ref 1.4–6.5)
NEUTROPHILS NFR BLD AUTO: 66.4 % — SIGNIFICANT CHANGE UP (ref 42.2–75.2)
NRBC # BLD: 0 /100 WBCS — SIGNIFICANT CHANGE UP (ref 0–0)
PLATELET # BLD AUTO: 167 K/UL — SIGNIFICANT CHANGE UP (ref 130–400)
POTASSIUM SERPL-MCNC: 4.5 MMOL/L — SIGNIFICANT CHANGE UP (ref 3.5–5)
POTASSIUM SERPL-SCNC: 4.5 MMOL/L — SIGNIFICANT CHANGE UP (ref 3.5–5)
RBC # BLD: 2.34 M/UL — LOW (ref 4.2–5.4)
RBC # FLD: 14.5 % — SIGNIFICANT CHANGE UP (ref 11.5–14.5)
SODIUM SERPL-SCNC: 145 MMOL/L — SIGNIFICANT CHANGE UP (ref 135–146)
WBC # BLD: 12.36 K/UL — HIGH (ref 4.8–10.8)
WBC # FLD AUTO: 12.36 K/UL — HIGH (ref 4.8–10.8)

## 2019-06-27 PROCEDURE — 99232 SBSQ HOSP IP/OBS MODERATE 35: CPT

## 2019-06-27 PROCEDURE — 76770 US EXAM ABDO BACK WALL COMP: CPT | Mod: 26

## 2019-06-27 RX ADMIN — CHLORHEXIDINE GLUCONATE 1 APPLICATION(S): 213 SOLUTION TOPICAL at 05:33

## 2019-06-27 RX ADMIN — MEMANTINE HYDROCHLORIDE 10 MILLIGRAM(S): 10 TABLET ORAL at 05:23

## 2019-06-27 RX ADMIN — HEPARIN SODIUM 5000 UNIT(S): 5000 INJECTION INTRAVENOUS; SUBCUTANEOUS at 13:27

## 2019-06-27 RX ADMIN — Medication 81 MILLIGRAM(S): at 11:11

## 2019-06-27 RX ADMIN — LOSARTAN POTASSIUM 25 MILLIGRAM(S): 100 TABLET, FILM COATED ORAL at 05:23

## 2019-06-27 RX ADMIN — HEPARIN SODIUM 5000 UNIT(S): 5000 INJECTION INTRAVENOUS; SUBCUTANEOUS at 05:23

## 2019-06-27 RX ADMIN — PANTOPRAZOLE SODIUM 40 MILLIGRAM(S): 20 TABLET, DELAYED RELEASE ORAL at 08:49

## 2019-06-27 RX ADMIN — Medication 650 MILLIGRAM(S): at 11:40

## 2019-06-27 RX ADMIN — Medication 650 MILLIGRAM(S): at 05:23

## 2019-06-27 RX ADMIN — Medication 650 MILLIGRAM(S): at 05:33

## 2019-06-27 RX ADMIN — DONEPEZIL HYDROCHLORIDE 10 MILLIGRAM(S): 10 TABLET, FILM COATED ORAL at 21:14

## 2019-06-27 RX ADMIN — HEPARIN SODIUM 5000 UNIT(S): 5000 INJECTION INTRAVENOUS; SUBCUTANEOUS at 21:13

## 2019-06-27 RX ADMIN — Medication 650 MILLIGRAM(S): at 11:11

## 2019-06-27 RX ADMIN — ATORVASTATIN CALCIUM 20 MILLIGRAM(S): 80 TABLET, FILM COATED ORAL at 21:14

## 2019-06-27 RX ADMIN — HALOPERIDOL DECANOATE 1 MILLIGRAM(S): 100 INJECTION INTRAMUSCULAR at 18:01

## 2019-06-27 RX ADMIN — MEMANTINE HYDROCHLORIDE 10 MILLIGRAM(S): 10 TABLET ORAL at 17:13

## 2019-06-27 RX ADMIN — Medication 650 MILLIGRAM(S): at 17:12

## 2019-06-27 RX ADMIN — Medication 650 MILLIGRAM(S): at 17:13

## 2019-06-27 NOTE — PROGRESS NOTE ADULT - SUBJECTIVE AND OBJECTIVE BOX
SUBJECTIVE:    Patient is a 89y old Female who presents with a chief complaint of s/p trip and fall (26 Jun 2019 16:10)    Currently admitted to medicine with the primary diagnosis of S/p fall and acute fracture of the left superior and inferior pubic rami, Lt distal radius fracture s/p splint for wrist     Today is hospital day 6d. This morning she is resting comfortably in bed spoke with daughter at bedside now she wants her mother to go to SNF  overnight nuñez was placed for urinary retention    PAST MEDICAL & SURGICAL HISTORY  Hyperlipidemia  Hypertension  Constipation  TIA (transient ischemic attack)  Urinary retention  No significant past surgical history    SOCIAL HISTORY:  Negative for smoking/alcohol/drug use.     ALLERGIES:  No Known Allergies    MEDICATIONS:  STANDING MEDICATIONS  acetaminophen   Tablet .. 650 milliGRAM(s) Oral every 6 hours  aspirin  chewable 81 milliGRAM(s) Oral daily  atorvastatin 20 milliGRAM(s) Oral at bedtime  chlorhexidine 4% Liquid 1 Application(s) Topical <User Schedule>  donepezil 10 milliGRAM(s) Oral at bedtime  haloperidol    Concentrate 1 milliGRAM(s) Oral at bedtime  heparin  Injectable 5000 Unit(s) SubCutaneous every 8 hours  losartan 25 milliGRAM(s) Oral daily  memantine 10 milliGRAM(s) Oral two times a day  pantoprazole    Tablet 40 milliGRAM(s) Oral before breakfast    PRN MEDICATIONS  haloperidol    Concentrate 1 milliGRAM(s) Oral every 6 hours PRN    VITALS:   T(F): 96.3  HR: 75  BP: 137/66  RR: 18  SpO2: --    LABS:                        7.4    12.36 )-----------( 167      ( 27 Jun 2019 05:42 )             23.0     06-27    145  |  113<H>  |  49<H>  ----------------------------<  100<H>  4.5   |  20  |  1.5    Ca    8.3<L>      27 Jun 2019 05:42  Mg     2.1     06-26                    RADIOLOGY:    PHYSICAL EXAM:  GEN: No acute distress  LUNGS: Clear to auscultation bilaterally   HEART: S1/S2 present. RRR.   ABD: Soft, non-tender, non-distended. Bowel sounds present  EXT: No edema  NEURO: couldn't assess

## 2019-06-27 NOTE — PROGRESS NOTE ADULT - ASSESSMENT
89yo F with Past Medical History HTN, Hypercholestremia, and Dementia admitted status post fall complicated by acute fractures of the left superior and inferior pubic ramus, as well as left distal radius fracture.     Fall complicated by superior/inferior pubic ramus fracture & left distal radius fracture: status post splinting to the left upper extremity.  No new complaints.  Continue Tylenol PRN for pain.  Acute kidney injury: secondary to urinary obstruction.  Status post nuñez catheter placement.  Creatinine recovered to 1.5  Acute blood loss anemia: hemoglobin decreased to 7.4 likely due to multiple fractures  AMS rule out seizures: patient vice transferred from Surgery to the Medical Service for workup of altered mental status.  Routine EEG was negative for seizures.  MRI revealed significant volume loss.  Hypertension: continue Losartan 25mg PO q24  Hypercholesteremia: continue Lipitor 20mg QHS  Dementia: continue Memantine and Donepezil as directed  GI/DVT prophylaxis    #Progress Note Handoff    Pending:  Tests: trend CBC and BMP  Future Disposition: Medically stable for discharge to SNF

## 2019-06-27 NOTE — PROGRESS NOTE ADULT - SUBJECTIVE AND OBJECTIVE BOX
GARETH CUEVAS [id number]    Hospitalist Note  89yo F with Past Medical History HTN, Hypercholestremia, and Dementia admitted status post fall complicated by acute fractures of the left superior and inferior pubic ramus, as well as left distal radius fracture.  Status post splinting by orthopedic surgery.  Her clinical course was complicated by an episode of unresponsiveness on 6/23/19 rule out seizure.    Overnight events/Updates: Neurologic workup was negative for seizures.  The patient was found to suffer from acute kidney injury with significant urinary retention.  Status post nuñez catheter placement.  Kidney function recovered decreasing to 1.5    Vital Signs Last 24 Hrs  T(C): 35.7 (27 Jun 2019 07:25), Max: 37.1 (27 Jun 2019 04:00)  T(F): 96.3 (27 Jun 2019 07:25), Max: 98.8 (27 Jun 2019 04:00)  HR: 75 (27 Jun 2019 07:25) (64 - 93)  BP: 137/66 (27 Jun 2019 07:25) (102/49 - 145/64)  BP(mean): --  RR: 18 (27 Jun 2019 07:25) (18 - 18)  SpO2: --    Physical Examination:  General: AAO x 3  HEENT: PERRLA, EOMI  CV= S1 & S2 appreciated  Lungs= CTA BL  Abdominal Examination= + BS, Soft, NT/ND, +nuñez  Extremity Examination= No C/C/E; splint to the LUE    ROS: No chest pain, no shortness of breath.  All other systems reviewed and are within normal limits except for the complaints in the HPI.    MEDICATIONS  (STANDING):  acetaminophen   Tablet .. 650 milliGRAM(s) Oral every 6 hours  aspirin  chewable 81 milliGRAM(s) Oral daily  atorvastatin 20 milliGRAM(s) Oral at bedtime  chlorhexidine 4% Liquid 1 Application(s) Topical <User Schedule>  donepezil 10 milliGRAM(s) Oral at bedtime  haloperidol    Concentrate 1 milliGRAM(s) Oral at bedtime  heparin  Injectable 5000 Unit(s) SubCutaneous every 8 hours  losartan 25 milliGRAM(s) Oral daily  memantine 10 milliGRAM(s) Oral two times a day  pantoprazole    Tablet 40 milliGRAM(s) Oral before breakfast    MEDICATIONS  (PRN):  haloperidol    Concentrate 1 milliGRAM(s) Oral every 6 hours PRN agitation                            7.4    12.36 )-----------( 167      ( 27 Jun 2019 05:42 )             23.0     06-27    145  |  113<H>  |  49<H>  ----------------------------<  100<H>  4.5   |  20  |  1.5    Ca    8.3<L>      27 Jun 2019 05:42  Mg     2.1     06-26        Case discussed with housestaff & family  TAWANDA Del Cid [id number]

## 2019-06-27 NOTE — PROGRESS NOTE ADULT - ASSESSMENT
89F w/ PMHx of  HTN, DLD dementia admitted for s/p fall found to have  Acute fractures of the left superior and inferior pubic rami, Lt distal radius fracture and splinting was doen by ortho to her wrist had an episode of unresponsiveness on 6/23 and transfered, CT head negative from surgery to medicine for further work up for unresponsiveness and r/o seizure.     #) JAIDA on CKD likely due to retention imrproving  -yesterday night nuñez was placed in for urinary retention  -creatinine is better today 1.5 from 2.3 yesterday    #)S/p fall and acute fracture of the left superior and inferior pubic rami, Lt distal radius fracture s/p splint for wrist  -no surgical intervention was done by ortho  -Pain control  -NWB on LUE  -PWB on LLE  -Follow up with ortho as an OP    #)Episode of unresponsiveness -seizure ruled out  -CT scan of head negative  -EEG negative for seizure  -MRI negative for any pathology    #)Acute on chronic anemia (Macrocytic)  -Hb stable    #)HTN  -c/w losartan     #)Dementia  -c/w donepezil and memantine    #)DLD  c/w lipitor    #)Diet: DASH  #)Activtity: Ambulate with assistance  #)DVT ppx: Hep SC  #)GI ppx: Protonix  #)Dispo; Daughter wants her mother to go to SNF pending SNF placement  #)Code; DNR/DNI 89F w/ PMHx of  HTN, DLD dementia admitted for s/p fall found to have  Acute fractures of the left superior and inferior pubic rami, Lt distal radius fracture and splinting was doen by ortho to her wrist had an episode of unresponsiveness on 6/23 and transfered, CT head negative from surgery to medicine for further work up for unresponsiveness and r/o seizure.     #) JAIDA on CKD likely due to retention imrproving  -yesterday night nuñez was placed in for urinary retention  -creatinine is better today 1.5 from 2.3 yesterday    #)S/p fall and acute fracture of the left superior and inferior pubic rami, Lt distal radius fracture s/p splint for wrist  -no surgical intervention was done by ortho  -Pain control  -NWB on LUE  -PWB on LLE  -Follow up with ortho as an OP    #)Episode of unresponsiveness -seizure ruled out  -CT scan of head negative  -EEG negative for seizure  -MRI negative for any pathology    #)Acute on chronic anemia (Macrocytic)  -Hb stable    #)HTN  -c/w losartan     #)Dementia  -c/w donepezil and memantine    #)DLD  c/w lipitor    #)Diet: DASH  #)Activity: Ambulate with assistance  #)DVT ppx: Hep SC  #)GI ppx: Protonix  #)Dispo; Daughter wants her mother to go to SNF pending SNF placement  #)Code; DNR/DNI 89F w/ PMHx of  HTN, DLD dementia admitted for s/p fall found to have  Acute fractures of the left superior and inferior pubic rami, Lt distal radius fracture and splinting was doen by ortho to her wrist had an episode of unresponsiveness on 6/23 and transfered, CT head negative from surgery to medicine for further work up for unresponsiveness and r/o seizure.     #) JAIDA on CKD likely due to retention imrproving  -yesterday night nuñez was placed in for urinary retention  -creatinine is better today 1.5 from 2.3 yesterday  -c/w nuñez for now and need to be reassessed in one week for trial of void    #)S/p fall and acute fracture of the left superior and inferior pubic rami, Lt distal radius fracture s/p splint for wrist  -no surgical intervention was done by ortho  -Pain control  -NWB on LUE  -PWB on LLE  -Follow up with ortho as an OP    #)Episode of unresponsiveness -seizure ruled out  -CT scan of head negative  -EEG negative for seizure  -MRI negative for any pathology    #)Acute on chronic anemia (Macrocytic)  -Hb stable    #)HTN  -c/w losartan     #)Dementia  -c/w donepezil and memantine    #)DLD  c/w lipitor    #)Diet: DASH  #)Activity: Ambulate with assistance  #)DVT ppx: Hep SC  #)GI ppx: Protonix  #)Dispo; Daughter wants her mother to go to SNF pending SNF placement  #)Code; DNR/DNI

## 2019-06-27 NOTE — CHART NOTE - NSCHARTNOTEFT_GEN_A_CORE
called by RN for worsening hematuria after Nuñez placement, consider traumatic nuñez placement. Requested vitals. Patient does not complain of abdominal pain. renal US and UA ordered, CBC, type and screen, and ck ordered for next lab round. If vitals are abnormal will escalate care accordingly. called by RN for worsening hematuria over 12 hours after Raman placement. Requested vitals. Patient does not complain of abdominal pain. renal US and UA ordered, CBC, type and screen, and ck ordered for next lab round. If vitals are abnormal will escalate care accordingly.

## 2019-06-28 LAB
ANION GAP SERPL CALC-SCNC: 11 MMOL/L — SIGNIFICANT CHANGE UP (ref 7–14)
APPEARANCE UR: ABNORMAL
BASOPHILS # BLD AUTO: 0.03 K/UL — SIGNIFICANT CHANGE UP (ref 0–0.2)
BASOPHILS NFR BLD AUTO: 0.2 % — SIGNIFICANT CHANGE UP (ref 0–1)
BILIRUB UR-MCNC: ABNORMAL
BLD GP AB SCN SERPL QL: SIGNIFICANT CHANGE UP
BUN SERPL-MCNC: 31 MG/DL — HIGH (ref 10–20)
CALCIUM SERPL-MCNC: 8.4 MG/DL — LOW (ref 8.5–10.1)
CHLORIDE SERPL-SCNC: 112 MMOL/L — HIGH (ref 98–110)
CK SERPL-CCNC: 185 U/L — SIGNIFICANT CHANGE UP (ref 0–225)
CO2 SERPL-SCNC: 23 MMOL/L — SIGNIFICANT CHANGE UP (ref 17–32)
COLOR SPEC: ABNORMAL
CREAT SERPL-MCNC: 0.9 MG/DL — SIGNIFICANT CHANGE UP (ref 0.7–1.5)
DIFF PNL FLD: ABNORMAL
EOSINOPHIL # BLD AUTO: 0.28 K/UL — SIGNIFICANT CHANGE UP (ref 0–0.7)
EOSINOPHIL NFR BLD AUTO: 2.3 % — SIGNIFICANT CHANGE UP (ref 0–8)
GLUCOSE SERPL-MCNC: 98 MG/DL — SIGNIFICANT CHANGE UP (ref 70–99)
GLUCOSE UR QL: NEGATIVE MG/DL — SIGNIFICANT CHANGE UP
HCT VFR BLD CALC: 23.3 % — LOW (ref 37–47)
HGB BLD-MCNC: 7.4 G/DL — LOW (ref 12–16)
IMM GRANULOCYTES NFR BLD AUTO: 1.5 % — HIGH (ref 0.1–0.3)
KETONES UR-MCNC: 15
LEUKOCYTE ESTERASE UR-ACNC: ABNORMAL
LYMPHOCYTES # BLD AUTO: 18.5 % — LOW (ref 20.5–51.1)
LYMPHOCYTES # BLD AUTO: 2.3 K/UL — SIGNIFICANT CHANGE UP (ref 1.2–3.4)
MCHC RBC-ENTMCNC: 31.6 PG — HIGH (ref 27–31)
MCHC RBC-ENTMCNC: 31.8 G/DL — LOW (ref 32–37)
MCV RBC AUTO: 99.6 FL — HIGH (ref 81–99)
MONOCYTES # BLD AUTO: 1.13 K/UL — HIGH (ref 0.1–0.6)
MONOCYTES NFR BLD AUTO: 9.1 % — SIGNIFICANT CHANGE UP (ref 1.7–9.3)
NEUTROPHILS # BLD AUTO: 8.48 K/UL — HIGH (ref 1.4–6.5)
NEUTROPHILS NFR BLD AUTO: 68.4 % — SIGNIFICANT CHANGE UP (ref 42.2–75.2)
NITRITE UR-MCNC: POSITIVE
NRBC # BLD: 0 /100 WBCS — SIGNIFICANT CHANGE UP (ref 0–0)
PH UR: 6 — SIGNIFICANT CHANGE UP (ref 5–8)
PLATELET # BLD AUTO: 193 K/UL — SIGNIFICANT CHANGE UP (ref 130–400)
POTASSIUM SERPL-MCNC: 4.4 MMOL/L — SIGNIFICANT CHANGE UP (ref 3.5–5)
POTASSIUM SERPL-SCNC: 4.4 MMOL/L — SIGNIFICANT CHANGE UP (ref 3.5–5)
PROT UR-MCNC: 100 MG/DL
RBC # BLD: 2.34 M/UL — LOW (ref 4.2–5.4)
RBC # FLD: 14.8 % — HIGH (ref 11.5–14.5)
SODIUM SERPL-SCNC: 146 MMOL/L — SIGNIFICANT CHANGE UP (ref 135–146)
SP GR SPEC: 1.02 — SIGNIFICANT CHANGE UP (ref 1.01–1.03)
UROBILINOGEN FLD QL: 1 MG/DL (ref 0.2–0.2)
WBC # BLD: 12.4 K/UL — HIGH (ref 4.8–10.8)
WBC # FLD AUTO: 12.4 K/UL — HIGH (ref 4.8–10.8)

## 2019-06-28 PROCEDURE — 93010 ELECTROCARDIOGRAM REPORT: CPT

## 2019-06-28 PROCEDURE — 99232 SBSQ HOSP IP/OBS MODERATE 35: CPT

## 2019-06-28 RX ORDER — CIPROFLOXACIN LACTATE 400MG/40ML
1 VIAL (ML) INTRAVENOUS
Qty: 0 | Refills: 0 | DISCHARGE
Start: 2019-06-28

## 2019-06-28 RX ORDER — CIPROFLOXACIN LACTATE 400MG/40ML
250 VIAL (ML) INTRAVENOUS
Refills: 0 | Status: DISCONTINUED | OUTPATIENT
Start: 2019-06-28 | End: 2019-06-29

## 2019-06-28 RX ADMIN — LOSARTAN POTASSIUM 25 MILLIGRAM(S): 100 TABLET, FILM COATED ORAL at 05:57

## 2019-06-28 RX ADMIN — Medication 250 MILLIGRAM(S): at 17:22

## 2019-06-28 RX ADMIN — ATORVASTATIN CALCIUM 20 MILLIGRAM(S): 80 TABLET, FILM COATED ORAL at 21:33

## 2019-06-28 RX ADMIN — HEPARIN SODIUM 5000 UNIT(S): 5000 INJECTION INTRAVENOUS; SUBCUTANEOUS at 13:37

## 2019-06-28 RX ADMIN — MEMANTINE HYDROCHLORIDE 10 MILLIGRAM(S): 10 TABLET ORAL at 05:57

## 2019-06-28 RX ADMIN — Medication 650 MILLIGRAM(S): at 01:02

## 2019-06-28 RX ADMIN — Medication 650 MILLIGRAM(S): at 00:54

## 2019-06-28 RX ADMIN — Medication 650 MILLIGRAM(S): at 11:25

## 2019-06-28 RX ADMIN — Medication 650 MILLIGRAM(S): at 06:16

## 2019-06-28 RX ADMIN — HEPARIN SODIUM 5000 UNIT(S): 5000 INJECTION INTRAVENOUS; SUBCUTANEOUS at 21:34

## 2019-06-28 RX ADMIN — DONEPEZIL HYDROCHLORIDE 10 MILLIGRAM(S): 10 TABLET, FILM COATED ORAL at 21:34

## 2019-06-28 RX ADMIN — Medication 81 MILLIGRAM(S): at 11:25

## 2019-06-28 RX ADMIN — HEPARIN SODIUM 5000 UNIT(S): 5000 INJECTION INTRAVENOUS; SUBCUTANEOUS at 05:58

## 2019-06-28 RX ADMIN — Medication 250 MILLIGRAM(S): at 11:25

## 2019-06-28 RX ADMIN — Medication 650 MILLIGRAM(S): at 05:58

## 2019-06-28 RX ADMIN — Medication 650 MILLIGRAM(S): at 17:22

## 2019-06-28 RX ADMIN — PANTOPRAZOLE SODIUM 40 MILLIGRAM(S): 20 TABLET, DELAYED RELEASE ORAL at 10:07

## 2019-06-28 RX ADMIN — Medication 650 MILLIGRAM(S): at 23:55

## 2019-06-28 RX ADMIN — MEMANTINE HYDROCHLORIDE 10 MILLIGRAM(S): 10 TABLET ORAL at 17:22

## 2019-06-28 RX ADMIN — HALOPERIDOL DECANOATE 1 MILLIGRAM(S): 100 INJECTION INTRAMUSCULAR at 21:40

## 2019-06-28 NOTE — PROGRESS NOTE ADULT - SUBJECTIVE AND OBJECTIVE BOX
GARETH CUEVAS [id number]    Hospitalist Note  89yo F with Past Medical History HTN, Hypercholestremia, and Dementia admitted status post fall complicated by acute fractures of the left superior and inferior pubic ramus, as well as left distal radius fracture.  Status post splinting by orthopedic surgery.  Her clinical course was complicated by an episode of unresponsiveness on 6/23/19 rule out seizure.    Overnight events/Updates: Neurologic workup was negative for seizures.  Nightfloat reported an episode of hematuria last night; UA was found to be positive for acute cystitis.  Renal function normalized following nuñez catheter placement.    Vital Signs Last 24 Hrs  T(C): 36.2 (28 Jun 2019 07:37), Max: 36.6 (27 Jun 2019 15:24)  T(F): 97.1 (28 Jun 2019 07:37), Max: 97.9 (27 Jun 2019 15:24)  HR: 63 (28 Jun 2019 07:37) (63 - 100)  BP: 160/66 (28 Jun 2019 07:37) (119/71 - 160/66)  BP(mean): --  RR: 18 (28 Jun 2019 07:37) (18 - 18)  SpO2: 100% (27 Jun 2019 21:09) (100% - 100%)    Physical Examination:  General: AAO x 2  HEENT: PERRLA, EOMI  CV= S1 & S2 appreciated  Lungs= CTA BL  Abdominal Examination= + BS, Soft, NT/ND  Extremity Examination= splint to the LUE    ROS: No chest pain, no shortness of breath.  All other systems reviewed and are within normal limits except for the complaints in the HPI.    MEDICATIONS  (STANDING):  acetaminophen   Tablet .. 650 milliGRAM(s) Oral every 6 hours  aspirin  chewable 81 milliGRAM(s) Oral daily  atorvastatin 20 milliGRAM(s) Oral at bedtime  chlorhexidine 4% Liquid 1 Application(s) Topical <User Schedule>  ciprofloxacin     Tablet 250 milliGRAM(s) Oral two times a day  donepezil 10 milliGRAM(s) Oral at bedtime  haloperidol    Concentrate 1 milliGRAM(s) Oral at bedtime  heparin  Injectable 5000 Unit(s) SubCutaneous every 8 hours  losartan 25 milliGRAM(s) Oral daily  memantine 10 milliGRAM(s) Oral two times a day  pantoprazole    Tablet 40 milliGRAM(s) Oral before breakfast    MEDICATIONS  (PRN):  haloperidol    Concentrate 1 milliGRAM(s) Oral every 6 hours PRN agitation                            7.4    12.40 )-----------( 193      ( 28 Jun 2019 05:43 )             23.3     06-28    146  |  112<H>  |  31<H>  ----------------------------<  98  4.4   |  23  |  0.9    Ca    8.4<L>      28 Jun 2019 05:43        Case discussed with housestaff & family  TAWANDA Del Cid [id number]

## 2019-06-28 NOTE — PROGRESS NOTE ADULT - SUBJECTIVE AND OBJECTIVE BOX
SUBJECTIVE:    Patient is a 89y old Female who presents with a chief complaint of s/p trip and fall (27 Jun 2019 14:17)    Currently admitted to medicine with the primary diagnosis of Pubic ramus fracture and radius fracture s/p splint     Today is hospital day 7d. This morning she is resting comfortably in bed   overnight as per night team she developed hematuria but Hb is stable in AM and urine looks dark not bloody, no fever, no inc WBC count will follow UA, urine culture.  PAST MEDICAL & SURGICAL HISTORY  Hyperlipidemia  Hypertension  Constipation  TIA (transient ischemic attack)  Urinary retention  No significant past surgical history    SOCIAL HISTORY:  Negative for smoking/alcohol/drug use.     ALLERGIES:  No Known Allergies    MEDICATIONS:  STANDING MEDICATIONS  acetaminophen   Tablet .. 650 milliGRAM(s) Oral every 6 hours  aspirin  chewable 81 milliGRAM(s) Oral daily  atorvastatin 20 milliGRAM(s) Oral at bedtime  chlorhexidine 4% Liquid 1 Application(s) Topical <User Schedule>  donepezil 10 milliGRAM(s) Oral at bedtime  haloperidol    Concentrate 1 milliGRAM(s) Oral at bedtime  heparin  Injectable 5000 Unit(s) SubCutaneous every 8 hours  losartan 25 milliGRAM(s) Oral daily  memantine 10 milliGRAM(s) Oral two times a day  pantoprazole    Tablet 40 milliGRAM(s) Oral before breakfast    PRN MEDICATIONS  haloperidol    Concentrate 1 milliGRAM(s) Oral every 6 hours PRN    VITALS:   T(F): 97.1  HR: 63  BP: 160/66  RR: 18  SpO2: 100%    LABS:                        7.4    12.40 )-----------( 193      ( 28 Jun 2019 05:43 )             23.3     06-27    145  |  113<H>  |  49<H>  ----------------------------<  100<H>  4.5   |  20  |  1.5    Ca    8.3<L>      27 Jun 2019 05:42            Creatine Kinase, Serum: 185 U/L (06-28-19 @ 01:13)      CARDIAC MARKERS ( 28 Jun 2019 01:13 )  x     / x     / 185 U/L / x     / x          RADIOLOGY:    PHYSICAL EXAM:  GEN: No acute distress  LUNGS: Clear to auscultation bilaterally   HEART: S1/S2 present. RRR.   ABD: Soft, non-tender, non-distended. Bowel sounds present  EXT: No edema  NEURO: couldn't assess SUBJECTIVE:    Patient is a 89y old Female who presents with a chief complaint of s/p trip and fall (27 Jun 2019 14:17)    Currently admitted to medicine with the primary diagnosis of S/p fall and acute fracture of the left superior and inferior pubic rami, Lt distal radius fracture s/p splint for wrist     Today is hospital day 7d. This morning she is resting comfortably in bed   overnight as per night team she developed hematuria but Hb is stable in AM and urine looks dark not bloody, no fever, no inc WBC count will follow UA, urine culture.  PAST MEDICAL & SURGICAL HISTORY  Hyperlipidemia  Hypertension  Constipation  TIA (transient ischemic attack)  Urinary retention  No significant past surgical history    SOCIAL HISTORY:  Negative for smoking/alcohol/drug use.     ALLERGIES:  No Known Allergies    MEDICATIONS:  STANDING MEDICATIONS  acetaminophen   Tablet .. 650 milliGRAM(s) Oral every 6 hours  aspirin  chewable 81 milliGRAM(s) Oral daily  atorvastatin 20 milliGRAM(s) Oral at bedtime  chlorhexidine 4% Liquid 1 Application(s) Topical <User Schedule>  donepezil 10 milliGRAM(s) Oral at bedtime  haloperidol    Concentrate 1 milliGRAM(s) Oral at bedtime  heparin  Injectable 5000 Unit(s) SubCutaneous every 8 hours  losartan 25 milliGRAM(s) Oral daily  memantine 10 milliGRAM(s) Oral two times a day  pantoprazole    Tablet 40 milliGRAM(s) Oral before breakfast    PRN MEDICATIONS  haloperidol    Concentrate 1 milliGRAM(s) Oral every 6 hours PRN    VITALS:   T(F): 97.1  HR: 63  BP: 160/66  RR: 18  SpO2: 100%    LABS:                        7.4    12.40 )-----------( 193      ( 28 Jun 2019 05:43 )             23.3     06-27    145  |  113<H>  |  49<H>  ----------------------------<  100<H>  4.5   |  20  |  1.5    Ca    8.3<L>      27 Jun 2019 05:42            Creatine Kinase, Serum: 185 U/L (06-28-19 @ 01:13)      CARDIAC MARKERS ( 28 Jun 2019 01:13 )  x     / x     / 185 U/L / x     / x          RADIOLOGY:    PHYSICAL EXAM:  GEN: No acute distress  LUNGS: Clear to auscultation bilaterally   HEART: S1/S2 present. RRR.   ABD: Soft, non-tender, non-distended. Bowel sounds present  EXT: No edema  NEURO: AOx1 able to move extremities

## 2019-06-28 NOTE — PROGRESS NOTE ADULT - ASSESSMENT
89F w/ PMHx of  HTN, DLD dementia admitted for s/p fall found to have  Acute fractures of the left superior and inferior pubic rami, Lt distal radius fracture and splinting was doen by ortho to her wrist had an episode of unresponsiveness on 6/23 and transfered, CT head negative from surgery to medicine for further work up for unresponsiveness and r/o seizure.     #)hematuria overnight  -Hb is stable in AM, no fever, no inc WBC count  -will follow UA, urine culture    #) JAIDA on CKD likely due to retention improving  -Follow up BMP today  -c/w nuñez for now and need to be reassessed in one week for trial of void    #)S/p fall and acute fracture of the left superior and inferior pubic rami, Lt distal radius fracture s/p splint for wrist  -no surgical intervention was done by ortho  -Pain control  -NWB on LUE  -PWB on LLE  -Follow up with ortho as an OP    #)Episode of unresponsiveness -seizure ruled out  -CT scan of head negative  -EEG negative for seizure  -MRI negative for any pathology    #)Acute on chronic anemia (Macrocytic)-Hb stable    #)HTN-c/w losartan     #)Dementia-c/w donepezil and memantine    #)DLD-c/w lipitor    #)Diet: DASH  #)Activity: Ambulate with assistance  #)DVT ppx: Hep SC  #)GI ppx: Protonix  #)Dispo; Daughter wants her mother to go to SNF pending SNF placement  #)Code; DNR/DNI 89F w/ PMHx of  HTN, DLD dementia admitted for s/p fall found to have  Acute fractures of the left superior and inferior pubic rami, Lt distal radius fracture and splinting was doen by ortho to her wrist had an episode of unresponsiveness on 6/23 and transfered, CT head negative from surgery to medicine for further work up for unresponsiveness and r/o seizure.     #)hematuria overnight likely due to uncomplicated cystitis  -Hb is stable in AM, no fever, no inc WBC count  -UA positive for Large LE, and blood, follow urine cultures  -EKG showed no inc Qtc will start her on cipro     #) JAIDA on CKD likely due to retention improving  -Follow up BMP today  -c/w nuñez for now and need to be reassessed in one week for trial of void    #)S/p fall and acute fracture of the left superior and inferior pubic rami, Lt distal radius fracture s/p splint for wrist  -no surgical intervention was done by ortho  -Pain control  -NWB on LUE  -PWB on LLE  -Follow up with ortho as an OP    #)Episode of unresponsiveness -seizure ruled out  -CT scan of head negative  -EEG negative for seizure  -MRI negative for any pathology    #)Acute on chronic anemia (Macrocytic)-Hb stable    #)HTN-c/w losartan     #)Dementia-c/w donepezil and memantine    #)DLD-c/w lipitor    #)Diet: DASH  #)Activity: Ambulate with assistance  #)DVT ppx: Hep SC  #)GI ppx: Protonix  #)Dispo; Daughter wants her mother to go to SNF pending SNF placement  #)Code; DNR/DNI 89F w/ PMHx of  HTN, DLD dementia admitted for s/p fall found to have  Acute fractures of the left superior and inferior pubic rami, Lt distal radius fracture and splinting was doen by ortho to her wrist had an episode of unresponsiveness on 6/23 and transfered, CT head negative from surgery to medicine for further work up for unresponsiveness and r/o seizure.     #)hematuria overnight likely due to uncomplicated cystitis  -Hb is stable in AM, no fever, no inc WBC count  -UA positive for Large LE, and blood, follow urine cultures  -EKG showed no inc Qtc will start her on cipro adjusted for renal dosing    #) JAIDA on CKD likely due to retention improving  -Follow up BMP today  -c/w nuñez for now and need to be reassessed in one week for trial of void    #)S/p fall and acute fracture of the left superior and inferior pubic rami, Lt distal radius fracture s/p splint for wrist  -no surgical intervention was done by ortho  -Pain control  -NWB on LUE  -PWB on LLE  -Follow up with ortho as an OP    #)Episode of unresponsiveness -seizure ruled out  -CT scan of head negative  -EEG negative for seizure  -MRI negative for any pathology    #)Acute on chronic anemia (Macrocytic)-Hb stable    #)HTN-c/w losartan     #)Dementia-c/w donepezil and memantine    #)DLD-c/w lipitor    #)Diet: DASH  #)Activity: Ambulate with assistance  #)DVT ppx: Hep SC  #)GI ppx: Protonix  #)Dispo; Daughter wants her mother to go to SNF pending SNF placement  #)Code; DNR/DNI

## 2019-06-29 LAB
ANION GAP SERPL CALC-SCNC: 10 MMOL/L — SIGNIFICANT CHANGE UP (ref 7–14)
BASOPHILS # BLD AUTO: 0.04 K/UL — SIGNIFICANT CHANGE UP (ref 0–0.2)
BASOPHILS NFR BLD AUTO: 0.3 % — SIGNIFICANT CHANGE UP (ref 0–1)
BUN SERPL-MCNC: 32 MG/DL — HIGH (ref 10–20)
CALCIUM SERPL-MCNC: 8.4 MG/DL — LOW (ref 8.5–10.1)
CHLORIDE SERPL-SCNC: 115 MMOL/L — HIGH (ref 98–110)
CO2 SERPL-SCNC: 22 MMOL/L — SIGNIFICANT CHANGE UP (ref 17–32)
CREAT SERPL-MCNC: 1 MG/DL — SIGNIFICANT CHANGE UP (ref 0.7–1.5)
EOSINOPHIL # BLD AUTO: 0.23 K/UL — SIGNIFICANT CHANGE UP (ref 0–0.7)
EOSINOPHIL NFR BLD AUTO: 1.7 % — SIGNIFICANT CHANGE UP (ref 0–8)
GLUCOSE SERPL-MCNC: 110 MG/DL — HIGH (ref 70–99)
HCT VFR BLD CALC: 21.8 % — LOW (ref 37–47)
HGB BLD-MCNC: 6.8 G/DL — CRITICAL LOW (ref 12–16)
IMM GRANULOCYTES NFR BLD AUTO: 1.6 % — HIGH (ref 0.1–0.3)
LYMPHOCYTES # BLD AUTO: 19.3 % — LOW (ref 20.5–51.1)
LYMPHOCYTES # BLD AUTO: 2.56 K/UL — SIGNIFICANT CHANGE UP (ref 1.2–3.4)
MCHC RBC-ENTMCNC: 31.2 G/DL — LOW (ref 32–37)
MCHC RBC-ENTMCNC: 31.3 PG — HIGH (ref 27–31)
MCV RBC AUTO: 100.5 FL — HIGH (ref 81–99)
MONOCYTES # BLD AUTO: 1.08 K/UL — HIGH (ref 0.1–0.6)
MONOCYTES NFR BLD AUTO: 8.1 % — SIGNIFICANT CHANGE UP (ref 1.7–9.3)
NEUTROPHILS # BLD AUTO: 9.15 K/UL — HIGH (ref 1.4–6.5)
NEUTROPHILS NFR BLD AUTO: 69 % — SIGNIFICANT CHANGE UP (ref 42.2–75.2)
NRBC # BLD: 0 /100 WBCS — SIGNIFICANT CHANGE UP (ref 0–0)
PLATELET # BLD AUTO: 224 K/UL — SIGNIFICANT CHANGE UP (ref 130–400)
POTASSIUM SERPL-MCNC: 4.2 MMOL/L — SIGNIFICANT CHANGE UP (ref 3.5–5)
POTASSIUM SERPL-SCNC: 4.2 MMOL/L — SIGNIFICANT CHANGE UP (ref 3.5–5)
RBC # BLD: 2.17 M/UL — LOW (ref 4.2–5.4)
RBC # FLD: 15.9 % — HIGH (ref 11.5–14.5)
SODIUM SERPL-SCNC: 147 MMOL/L — HIGH (ref 135–146)
WBC # BLD: 13.27 K/UL — HIGH (ref 4.8–10.8)
WBC # FLD AUTO: 13.27 K/UL — HIGH (ref 4.8–10.8)

## 2019-06-29 PROCEDURE — 99233 SBSQ HOSP IP/OBS HIGH 50: CPT

## 2019-06-29 RX ORDER — CIPROFLOXACIN LACTATE 400MG/40ML
400 VIAL (ML) INTRAVENOUS EVERY 12 HOURS
Refills: 0 | Status: DISCONTINUED | OUTPATIENT
Start: 2019-06-29 | End: 2019-06-30

## 2019-06-29 RX ADMIN — MEMANTINE HYDROCHLORIDE 10 MILLIGRAM(S): 10 TABLET ORAL at 05:27

## 2019-06-29 RX ADMIN — HALOPERIDOL DECANOATE 1 MILLIGRAM(S): 100 INJECTION INTRAMUSCULAR at 21:01

## 2019-06-29 RX ADMIN — ATORVASTATIN CALCIUM 20 MILLIGRAM(S): 80 TABLET, FILM COATED ORAL at 21:01

## 2019-06-29 RX ADMIN — Medication 650 MILLIGRAM(S): at 13:44

## 2019-06-29 RX ADMIN — Medication 650 MILLIGRAM(S): at 23:38

## 2019-06-29 RX ADMIN — CHLORHEXIDINE GLUCONATE 1 APPLICATION(S): 213 SOLUTION TOPICAL at 05:27

## 2019-06-29 RX ADMIN — Medication 650 MILLIGRAM(S): at 17:28

## 2019-06-29 RX ADMIN — Medication 650 MILLIGRAM(S): at 17:29

## 2019-06-29 RX ADMIN — Medication 250 MILLIGRAM(S): at 05:27

## 2019-06-29 RX ADMIN — DONEPEZIL HYDROCHLORIDE 10 MILLIGRAM(S): 10 TABLET, FILM COATED ORAL at 21:01

## 2019-06-29 RX ADMIN — LOSARTAN POTASSIUM 25 MILLIGRAM(S): 100 TABLET, FILM COATED ORAL at 05:27

## 2019-06-29 RX ADMIN — Medication 650 MILLIGRAM(S): at 05:27

## 2019-06-29 RX ADMIN — MEMANTINE HYDROCHLORIDE 10 MILLIGRAM(S): 10 TABLET ORAL at 17:28

## 2019-06-29 RX ADMIN — HEPARIN SODIUM 5000 UNIT(S): 5000 INJECTION INTRAVENOUS; SUBCUTANEOUS at 05:27

## 2019-06-29 RX ADMIN — Medication 650 MILLIGRAM(S): at 00:35

## 2019-06-29 RX ADMIN — Medication 650 MILLIGRAM(S): at 11:43

## 2019-06-29 RX ADMIN — Medication 81 MILLIGRAM(S): at 13:44

## 2019-06-29 RX ADMIN — Medication 200 MILLIGRAM(S): at 17:29

## 2019-06-29 RX ADMIN — Medication 650 MILLIGRAM(S): at 06:48

## 2019-06-29 RX ADMIN — PANTOPRAZOLE SODIUM 40 MILLIGRAM(S): 20 TABLET, DELAYED RELEASE ORAL at 05:26

## 2019-06-29 NOTE — PROGRESS NOTE ADULT - ASSESSMENT
89yo F with Past Medical History HTN, Hypercholestremia, and Dementia admitted status post fall complicated by acute fractures of the left superior and inferior pubic ramus, as well as left distal radius fracture.     Fall complicated by superior/inferior pubic ramus fracture & left distal radius fracture: status post splinting to the left upper extremity.  No new complaints.  Continue Tylenol PRN for pain.  Please encourage OOB to chair.  Acute cystitis secondary to nuñez catheter: likely related to nuñez.  Increase Cipro to 500mg IV q12 following resolution of JAIDA. Encourage fluid intake for mild dehydration.  Acute blood loss anemia: hemoglobin decreased to 6.8; will transfuse 1u PRBC.  AMS rule out seizures: patient was transferred from Surgery to the Medical Service for workup of altered mental status.  Routine EEG was negative for seizures.  MRI revealed significant volume loss.  Hypertension: continue Losartan 25mg PO q24  Hypercholesteremia: continue Lipitor 20mg QHS  Dementia: continue Memantine and Donepezil as directed  GI/DVT prophylaxis: hold Lovenox due to persistent hematuria    #Progress Note Handoff    Pending:  Tests: Pending urine cultures  Future Disposition: Discharge to SNF when bed available

## 2019-06-29 NOTE — PROGRESS NOTE ADULT - SUBJECTIVE AND OBJECTIVE BOX
GARETH CUEVAS [id number]    Hospitalist Note  89yo F with Past Medical History HTN, Hypercholestremia, and Dementia admitted status post fall complicated by acute fractures of the left superior and inferior pubic ramus, as well as left distal radius fracture.  Status post splinting by orthopedic surgery.  Her clinical course was complicated by an episode of unresponsiveness on 6/23/19 rule out seizure.    Overnight events/Updates: The patient suffers from persistent hematuria; her hemoglobin worsened to 6.8    Vital Signs Last 24 Hrs  T(C): 36.1 (29 Jun 2019 07:35), Max: 36.1 (29 Jun 2019 00:00)  T(F): 97 (29 Jun 2019 07:35), Max: 97 (29 Jun 2019 07:35)  HR: 98 (29 Jun 2019 07:35) (88 - 98)  BP: 135/62 (29 Jun 2019 07:35) (121/59 - 146/68)  BP(mean): --  RR: 18 (29 Jun 2019 07:35) (18 - 18)  SpO2: --    Physical Examination:  General: AAO x 2; responds to name  HEENT: PERRLA, EOMI  CV= S1 & S2 appreciated  Lungs= CTA BL  Abdominal Examination= + BS, Soft, NT/ND, + nuñez  Extremity Examination= No C/C/E    ROS: No chest pain, no shortness of breath.  All other systems reviewed and are within normal limits except for the complaints in the HPI.    MEDICATIONS  (STANDING):  acetaminophen   Tablet .. 650 milliGRAM(s) Oral every 6 hours  aspirin  chewable 81 milliGRAM(s) Oral daily  atorvastatin 20 milliGRAM(s) Oral at bedtime  chlorhexidine 4% Liquid 1 Application(s) Topical <User Schedule>  ciprofloxacin     Tablet 250 milliGRAM(s) Oral two times a day  donepezil 10 milliGRAM(s) Oral at bedtime  haloperidol    Concentrate 1 milliGRAM(s) Oral at bedtime  heparin  Injectable 5000 Unit(s) SubCutaneous every 8 hours  losartan 25 milliGRAM(s) Oral daily  memantine 10 milliGRAM(s) Oral two times a day  pantoprazole    Tablet 40 milliGRAM(s) Oral before breakfast    MEDICATIONS  (PRN):  haloperidol    Concentrate 1 milliGRAM(s) Oral every 6 hours PRN agitation                            6.8    13.27 )-----------( 224      ( 29 Jun 2019 05:23 )             21.8     06-29    147<H>  |  115<H>  |  32<H>  ----------------------------<  110<H>  4.2   |  22  |  1.0    Ca    8.4<L>      29 Jun 2019 05:23        Case discussed with housestaff & family  TAWANDA Del Cid [id number]

## 2019-06-29 NOTE — PROCEDURE NOTE - NSPROCDETAILS_GEN_ALL_CORE
ultrasound guidance/supine position/location identified, draped/prepped, sterile technique used/sterile dressing applied/sterile technique, catheter placed

## 2019-06-30 LAB
ANION GAP SERPL CALC-SCNC: 10 MMOL/L — SIGNIFICANT CHANGE UP (ref 7–14)
BASOPHILS # BLD AUTO: 0.04 K/UL — SIGNIFICANT CHANGE UP (ref 0–0.2)
BASOPHILS NFR BLD AUTO: 0.3 % — SIGNIFICANT CHANGE UP (ref 0–1)
BUN SERPL-MCNC: 34 MG/DL — HIGH (ref 10–20)
CALCIUM SERPL-MCNC: 8.2 MG/DL — LOW (ref 8.5–10.1)
CHLORIDE SERPL-SCNC: 116 MMOL/L — HIGH (ref 98–110)
CO2 SERPL-SCNC: 22 MMOL/L — SIGNIFICANT CHANGE UP (ref 17–32)
CREAT SERPL-MCNC: 1.1 MG/DL — SIGNIFICANT CHANGE UP (ref 0.7–1.5)
EOSINOPHIL # BLD AUTO: 0.13 K/UL — SIGNIFICANT CHANGE UP (ref 0–0.7)
EOSINOPHIL NFR BLD AUTO: 0.9 % — SIGNIFICANT CHANGE UP (ref 0–8)
GLUCOSE SERPL-MCNC: 121 MG/DL — HIGH (ref 70–99)
HCT VFR BLD CALC: 22.5 % — LOW (ref 37–47)
HGB BLD-MCNC: 7.2 G/DL — LOW (ref 12–16)
IMM GRANULOCYTES NFR BLD AUTO: 1.7 % — HIGH (ref 0.1–0.3)
LYMPHOCYTES # BLD AUTO: 14.4 % — LOW (ref 20.5–51.1)
LYMPHOCYTES # BLD AUTO: 2.16 K/UL — SIGNIFICANT CHANGE UP (ref 1.2–3.4)
MCHC RBC-ENTMCNC: 30.9 PG — SIGNIFICANT CHANGE UP (ref 27–31)
MCHC RBC-ENTMCNC: 32 G/DL — SIGNIFICANT CHANGE UP (ref 32–37)
MCV RBC AUTO: 96.6 FL — SIGNIFICANT CHANGE UP (ref 81–99)
MONOCYTES # BLD AUTO: 1.29 K/UL — HIGH (ref 0.1–0.6)
MONOCYTES NFR BLD AUTO: 8.6 % — SIGNIFICANT CHANGE UP (ref 1.7–9.3)
NEUTROPHILS # BLD AUTO: 11.1 K/UL — HIGH (ref 1.4–6.5)
NEUTROPHILS NFR BLD AUTO: 74.1 % — SIGNIFICANT CHANGE UP (ref 42.2–75.2)
NRBC # BLD: 0 /100 WBCS — SIGNIFICANT CHANGE UP (ref 0–0)
PLATELET # BLD AUTO: 215 K/UL — SIGNIFICANT CHANGE UP (ref 130–400)
POTASSIUM SERPL-MCNC: 4.1 MMOL/L — SIGNIFICANT CHANGE UP (ref 3.5–5)
POTASSIUM SERPL-SCNC: 4.1 MMOL/L — SIGNIFICANT CHANGE UP (ref 3.5–5)
RBC # BLD: 2.33 M/UL — LOW (ref 4.2–5.4)
RBC # FLD: 19 % — HIGH (ref 11.5–14.5)
SODIUM SERPL-SCNC: 148 MMOL/L — HIGH (ref 135–146)
WBC # BLD: 14.97 K/UL — HIGH (ref 4.8–10.8)
WBC # FLD AUTO: 14.97 K/UL — HIGH (ref 4.8–10.8)

## 2019-06-30 PROCEDURE — 99233 SBSQ HOSP IP/OBS HIGH 50: CPT

## 2019-06-30 RX ORDER — CEFEPIME 1 G/1
500 INJECTION, POWDER, FOR SOLUTION INTRAMUSCULAR; INTRAVENOUS EVERY 12 HOURS
Refills: 0 | Status: DISCONTINUED | OUTPATIENT
Start: 2019-06-30 | End: 2019-07-02

## 2019-06-30 RX ORDER — SODIUM CHLORIDE 9 MG/ML
1000 INJECTION, SOLUTION INTRAVENOUS
Refills: 0 | Status: DISCONTINUED | OUTPATIENT
Start: 2019-06-30 | End: 2019-07-03

## 2019-06-30 RX ADMIN — SODIUM CHLORIDE 50 MILLILITER(S): 9 INJECTION, SOLUTION INTRAVENOUS at 13:46

## 2019-06-30 RX ADMIN — HALOPERIDOL DECANOATE 1 MILLIGRAM(S): 100 INJECTION INTRAMUSCULAR at 22:07

## 2019-06-30 RX ADMIN — DONEPEZIL HYDROCHLORIDE 10 MILLIGRAM(S): 10 TABLET, FILM COATED ORAL at 22:06

## 2019-06-30 RX ADMIN — Medication 650 MILLIGRAM(S): at 13:48

## 2019-06-30 RX ADMIN — Medication 650 MILLIGRAM(S): at 05:53

## 2019-06-30 RX ADMIN — CEFEPIME 100 MILLIGRAM(S): 1 INJECTION, POWDER, FOR SOLUTION INTRAMUSCULAR; INTRAVENOUS at 18:32

## 2019-06-30 RX ADMIN — PANTOPRAZOLE SODIUM 40 MILLIGRAM(S): 20 TABLET, DELAYED RELEASE ORAL at 05:53

## 2019-06-30 RX ADMIN — Medication 81 MILLIGRAM(S): at 13:45

## 2019-06-30 RX ADMIN — Medication 650 MILLIGRAM(S): at 05:57

## 2019-06-30 RX ADMIN — Medication 200 MILLIGRAM(S): at 05:41

## 2019-06-30 RX ADMIN — LOSARTAN POTASSIUM 25 MILLIGRAM(S): 100 TABLET, FILM COATED ORAL at 05:55

## 2019-06-30 RX ADMIN — Medication 650 MILLIGRAM(S): at 18:32

## 2019-06-30 RX ADMIN — MEMANTINE HYDROCHLORIDE 10 MILLIGRAM(S): 10 TABLET ORAL at 05:54

## 2019-06-30 RX ADMIN — Medication 650 MILLIGRAM(S): at 11:45

## 2019-06-30 RX ADMIN — MEMANTINE HYDROCHLORIDE 10 MILLIGRAM(S): 10 TABLET ORAL at 18:33

## 2019-06-30 RX ADMIN — ATORVASTATIN CALCIUM 20 MILLIGRAM(S): 80 TABLET, FILM COATED ORAL at 22:07

## 2019-06-30 NOTE — PROGRESS NOTE ADULT - SUBJECTIVE AND OBJECTIVE BOX
Hospital Day:  9d    Subjective:    Patient is a 89y old  Female who presents with a chief complaint of s/p trip and fall. She had no acute events this night. This morning patient resting comfortably in bed. She denies fever, chills, CP, SOB, abdominal pain, N/V.       Past Medical Hx:   Hyperlipidemia  Hypertension  Constipation  TIA (transient ischemic attack)  Urinary retention  No pertinent past medical history    Past Sx:  No significant past surgical history    Allergies:  No Known Allergies    Current Meds:   Standng Meds:  acetaminophen   Tablet .. 650 milliGRAM(s) Oral every 6 hours  aspirin  chewable 81 milliGRAM(s) Oral daily  atorvastatin 20 milliGRAM(s) Oral at bedtime  cefepime   IVPB 500 milliGRAM(s) IV Intermittent every 12 hours  chlorhexidine 4% Liquid 1 Application(s) Topical <User Schedule>  dextrose 5%. 1000 milliLiter(s) (50 mL/Hr) IV Continuous <Continuous>  donepezil 10 milliGRAM(s) Oral at bedtime  haloperidol    Concentrate 1 milliGRAM(s) Oral at bedtime  losartan 25 milliGRAM(s) Oral daily  memantine 10 milliGRAM(s) Oral two times a day  pantoprazole    Tablet 40 milliGRAM(s) Oral before breakfast    PRN Meds:  haloperidol    Concentrate 1 milliGRAM(s) Oral every 6 hours PRN agitation    HOME MEDICATIONS:  aspirin 81 mg oral tablet: 1 tab(s) orally once a day  ciprofloxacin 250 mg oral tablet: 1 tab(s) orally 2 times a day please stop on 2019  donepezil 10 mg oral tablet: 1 tab(s) orally once a day (at bedtime)  irbesartan-hydrochlorothiazide 150mg-12.5mg oral tablet: 1 tab(s) orally once a day  LORazepam 0.5 mg oral tablet: 1 tab(s) orally 3 times a day, As Needed  lovastatin 20 mg oral tablet: 1 tab(s) orally once a day  memantine 10 mg oral tablet: 1 tab(s) orally 2 times a day  traZODone 50 mg oral tablet: 0.5 tab(s) orally once a day, As Needed      Vital Signs:   T(F): 98.5 (19 @ 09:00), Max: 100.6 (19 @ 05:55)  HR: 86 (19 @ 09:00) (86 - 95)  BP: 115/53 (19 @ 09:00) (115/53 - 146/68)  RR: 18 (19 @ 09:00) (18 - 18)  SpO2: --      19 @ 07:01  -  19 @ 07:00  --------------------------------------------------------  IN: 0 mL / OUT: 975 mL / NET: -975 mL        Physical Exam:   GENERAL: NAD  HEENT: NCAT  CHEST/LUNG: CTAB  HEART: Regular rate and rhythm; s1 s2 appreciated, No murmurs, rubs, or gallops  ABDOMEN: Soft, Nontender, Nondistended; Bowel sounds present  EXTREMITIES: No LE edema b/l  NERVOUS SYSTEM:  Alert & Oriented X3        Labs:                         7.2    14.97 )-----------( 215      ( 2019 05:32 )             22.5     Neutophil% 74.1, Lymphocyte% 14.4, Monocyte% 8.6, Bands% 1.7 19 @ 05:32    2019 05:32    148    |  116    |  34     ----------------------------<  121    4.1     |  22     |  1.1      Ca    8.2        2019 05:32    Troponin --, CKMB --,  19 @ 01:13    Urinalysis Basic - ( 2019 07:33 )    Color: Red / Appearance: Turbid / S.020 / pH: x  Gluc: x / Ketone: 15  / Bili: Large / Urobili: 1.0 mg/dL   Blood: x / Protein: 100 mg/dL / Nitrite: Positive   Leuk Esterase: Large / RBC: >50 /HPF / WBC >50 /HPF   Sq Epi: x / Non Sq Epi: x / Bacteria: Many /HPF            Radiology:

## 2019-06-30 NOTE — PROGRESS NOTE ADULT - ASSESSMENT
89yo F with Past Medical History HTN, Hypercholestremia, and Dementia admitted status post fall complicated by acute fractures of the left superior and inferior pubic ramus, as well as left distal radius fracture.     Fall complicated by superior/inferior pubic ramus fracture & left distal radius fracture: status post splinting to the left upper extremity.  No new complaints.  Continue Tylenol PRN for pain.  OOB to chair with nursing.  Acute cystitis secondary to nuñez catheter: likely related to nuñez.  Urine culture was positive for gram negatives.  Switch Cipro to Cefepime 1gm IV q12h, and adjust abx based on culture results.  Acute blood loss anemia: hemoglobin improved to 7.2 following transfusion.  Repeat CBC in AM.  No need for urology CS at this time; family prefers conservative management.  AMS rule out seizures: patient was transferred from Surgery to the Medical Service for altered mental status evaluation.  EEG was negative for seizures.  MRI revealed significant volume loss.   The pt suffers from a flat affect though responds approximately.  Hypertension: continue Losartan 25mg PO q24  Hypercholesteremia: continue Lipitor 20mg QHS  Dementia: continue Memantine and Donepezil as directed  GI/DVT prophylaxis: hold Lovenox due to persistent hematuria    #Progress Note Handoff    Pending:  Tests: Follow-up urine cultures and repeat CBC in AM  Future Disposition: Pending discharge to SNF

## 2019-06-30 NOTE — PROGRESS NOTE ADULT - ASSESSMENT
Assessment and Plan:    89yo F with PMG of HTN, Hypercholestremia, and Dementia s/p mechanical fall w/ acute fractures of the left superior and inferior pubic ramus, as well as left distal radius fracture.     #) acute blood loss anemia   - was given 1u of pRBCs for Hgb of 6.7. AM Hgb improved to 7.2  - will repeat CBC in AM   - will repeat CBC in the AM  - hematuria still present. No urology consult at this point. Will manage conservatively    #) UTI with AKD on CKD  - Urine cx + for gram negative bacteria  - will switch Abx to Cefepime IV 500mg q12 (renally dosed due to GFR of 41)  - will adjust Abx based on cx results  - c/w nuñez for now    #)S/p fall and acute fracture of the left superior and inferior pubic rami, Lt distal radius fracture s/p splint for wrist  - currently stable  - OOB chair with nursing  - c/w Pain control  - f/u with ortho as OP    #)Episode of unresponsiveness -seizure ruled out  -CT scan of head negative  -EEG negative for seizure  -MRI shows significant volume loss    #)HTN-c/w losartan     #)Dementia-c/w donepezil and memantine    #)DLD-c/w lipitor      GI/ ppx: hold Lovenox due to persistent hematuria  Code Status: DNR/DNI  DISPO: pending SNF placement

## 2019-06-30 NOTE — PROGRESS NOTE ADULT - SUBJECTIVE AND OBJECTIVE BOX
GARETH CUEVAS [id number]    Hospitalist Note  89yo F with Past Medical History HTN, Hypercholestremia, and Dementia admitted status post fall complicated by acute fractures of the left superior and inferior pubic ramus, as well as left distal radius fracture.  Status post splinting by orthopedic surgery.  Her clinical course was complicated by an episode of unresponsiveness on 6/23/19 rule out seizure.    Overnight events/Updates: Hemoglobin increased to 7.2 following transfusion.  Urine cultures were positive for gram negatives.  The patient suffers from persistent hematuria.     Vital Signs Last 24 Hrs   T(C): 36.9 (30 Jun 2019 09:00), Max: 38.1 (30 Jun 2019 05:55)  T(F): 98.5 (30 Jun 2019 09:00), Max: 100.6 (30 Jun 2019 05:55)  HR: 86 (30 Jun 2019 09:00) (86 - 95)  BP: 115/53 (30 Jun 2019 09:00) (115/53 - 146/68)  BP(mean): --  RR: 18 (30 Jun 2019 09:00) (18 - 18)  SpO2: --    Physical Examination:  General: AAO x 3  HEENT: PERRLA, EOMI  CV= S1 & S2 appreciated  Lungs= CTA BL  Abdominal Examination= + BS, Soft, NT/ND  Extremity Examination= No C/C/E    ROS: No chest pain, no shortness of breath.  All other systems reviewed and are within normal limits except for the complaints in the HPI.    MEDICATIONS  (STANDING):  acetaminophen   Tablet .. 650 milliGRAM(s) Oral every 6 hours  aspirin  chewable 81 milliGRAM(s) Oral daily  atorvastatin 20 milliGRAM(s) Oral at bedtime  cefepime   IVPB 500 milliGRAM(s) IV Intermittent every 12 hours  chlorhexidine 4% Liquid 1 Application(s) Topical <User Schedule>  dextrose 5%. 1000 milliLiter(s) (50 mL/Hr) IV Continuous <Continuous>  donepezil 10 milliGRAM(s) Oral at bedtime  haloperidol    Concentrate 1 milliGRAM(s) Oral at bedtime  losartan 25 milliGRAM(s) Oral daily  memantine 10 milliGRAM(s) Oral two times a day  pantoprazole    Tablet 40 milliGRAM(s) Oral before breakfast    MEDICATIONS  (PRN):  haloperidol    Concentrate 1 milliGRAM(s) Oral every 6 hours PRN agitation                            7.2    14.97 )-----------( 215      ( 30 Jun 2019 05:32 )             22.5     06-30    148<H>  |  116<H>  |  34<H>  ----------------------------<  121<H>  4.1   |  22  |  1.1    Ca    8.2<L>      30 Jun 2019 05:32        Case discussed with housestaff & family  TAWANDA Del Cid [id number]

## 2019-07-01 LAB
-  AMIKACIN: SIGNIFICANT CHANGE UP
-  AMPICILLIN/SULBACTAM: SIGNIFICANT CHANGE UP
-  AMPICILLIN: SIGNIFICANT CHANGE UP
-  AZTREONAM: SIGNIFICANT CHANGE UP
-  CEFEPIME: SIGNIFICANT CHANGE UP
-  CEFOXITIN: SIGNIFICANT CHANGE UP
-  CEFTRIAXONE: SIGNIFICANT CHANGE UP
-  CIPROFLOXACIN: SIGNIFICANT CHANGE UP
-  ERTAPENEM: SIGNIFICANT CHANGE UP
-  GENTAMICIN: SIGNIFICANT CHANGE UP
-  IMIPENEM: SIGNIFICANT CHANGE UP
-  LEVOFLOXACIN: SIGNIFICANT CHANGE UP
-  MEROPENEM: SIGNIFICANT CHANGE UP
-  NITROFURANTOIN: SIGNIFICANT CHANGE UP
-  PIPERACILLIN/TAZOBACTAM: SIGNIFICANT CHANGE UP
-  TIGECYCLINE: SIGNIFICANT CHANGE UP
-  TOBRAMYCIN: SIGNIFICANT CHANGE UP
-  TRIMETHOPRIM/SULFAMETHOXAZOLE: SIGNIFICANT CHANGE UP
ANION GAP SERPL CALC-SCNC: 12 MMOL/L — SIGNIFICANT CHANGE UP (ref 7–14)
BASOPHILS # BLD AUTO: 0.04 K/UL — SIGNIFICANT CHANGE UP (ref 0–0.2)
BASOPHILS NFR BLD AUTO: 0.3 % — SIGNIFICANT CHANGE UP (ref 0–1)
BUN SERPL-MCNC: 34 MG/DL — HIGH (ref 10–20)
CALCIUM SERPL-MCNC: 8.1 MG/DL — LOW (ref 8.5–10.1)
CHLORIDE SERPL-SCNC: 109 MMOL/L — SIGNIFICANT CHANGE UP (ref 98–110)
CO2 SERPL-SCNC: 21 MMOL/L — SIGNIFICANT CHANGE UP (ref 17–32)
CREAT SERPL-MCNC: 1 MG/DL — SIGNIFICANT CHANGE UP (ref 0.7–1.5)
EOSINOPHIL # BLD AUTO: 0.31 K/UL — SIGNIFICANT CHANGE UP (ref 0–0.7)
EOSINOPHIL NFR BLD AUTO: 2.4 % — SIGNIFICANT CHANGE UP (ref 0–8)
GLUCOSE SERPL-MCNC: 103 MG/DL — HIGH (ref 70–99)
HCT VFR BLD CALC: 22.8 % — LOW (ref 37–47)
HGB BLD-MCNC: 7.2 G/DL — LOW (ref 12–16)
IMM GRANULOCYTES NFR BLD AUTO: 1.7 % — HIGH (ref 0.1–0.3)
LYMPHOCYTES # BLD AUTO: 19.9 % — LOW (ref 20.5–51.1)
LYMPHOCYTES # BLD AUTO: 2.54 K/UL — SIGNIFICANT CHANGE UP (ref 1.2–3.4)
MCHC RBC-ENTMCNC: 30.6 PG — SIGNIFICANT CHANGE UP (ref 27–31)
MCHC RBC-ENTMCNC: 31.6 G/DL — LOW (ref 32–37)
MCV RBC AUTO: 97 FL — SIGNIFICANT CHANGE UP (ref 81–99)
METHOD TYPE: SIGNIFICANT CHANGE UP
MONOCYTES # BLD AUTO: 1.03 K/UL — HIGH (ref 0.1–0.6)
MONOCYTES NFR BLD AUTO: 8.1 % — SIGNIFICANT CHANGE UP (ref 1.7–9.3)
NEUTROPHILS # BLD AUTO: 8.65 K/UL — HIGH (ref 1.4–6.5)
NEUTROPHILS NFR BLD AUTO: 67.6 % — SIGNIFICANT CHANGE UP (ref 42.2–75.2)
NRBC # BLD: 0 /100 WBCS — SIGNIFICANT CHANGE UP (ref 0–0)
PLATELET # BLD AUTO: 228 K/UL — SIGNIFICANT CHANGE UP (ref 130–400)
POTASSIUM SERPL-MCNC: 4.1 MMOL/L — SIGNIFICANT CHANGE UP (ref 3.5–5)
POTASSIUM SERPL-SCNC: 4.1 MMOL/L — SIGNIFICANT CHANGE UP (ref 3.5–5)
RBC # BLD: 2.35 M/UL — LOW (ref 4.2–5.4)
RBC # FLD: 18.4 % — HIGH (ref 11.5–14.5)
SODIUM SERPL-SCNC: 142 MMOL/L — SIGNIFICANT CHANGE UP (ref 135–146)
WBC # BLD: 12.79 K/UL — HIGH (ref 4.8–10.8)
WBC # FLD AUTO: 12.79 K/UL — HIGH (ref 4.8–10.8)

## 2019-07-01 PROCEDURE — 99233 SBSQ HOSP IP/OBS HIGH 50: CPT

## 2019-07-01 RX ADMIN — ATORVASTATIN CALCIUM 20 MILLIGRAM(S): 80 TABLET, FILM COATED ORAL at 21:12

## 2019-07-01 RX ADMIN — CEFEPIME 100 MILLIGRAM(S): 1 INJECTION, POWDER, FOR SOLUTION INTRAMUSCULAR; INTRAVENOUS at 17:07

## 2019-07-01 RX ADMIN — HALOPERIDOL DECANOATE 1 MILLIGRAM(S): 100 INJECTION INTRAMUSCULAR at 21:13

## 2019-07-01 RX ADMIN — Medication 650 MILLIGRAM(S): at 17:07

## 2019-07-01 RX ADMIN — MEMANTINE HYDROCHLORIDE 10 MILLIGRAM(S): 10 TABLET ORAL at 17:07

## 2019-07-01 RX ADMIN — Medication 650 MILLIGRAM(S): at 13:19

## 2019-07-01 RX ADMIN — PANTOPRAZOLE SODIUM 40 MILLIGRAM(S): 20 TABLET, DELAYED RELEASE ORAL at 05:45

## 2019-07-01 RX ADMIN — Medication 650 MILLIGRAM(S): at 13:23

## 2019-07-01 RX ADMIN — Medication 650 MILLIGRAM(S): at 17:16

## 2019-07-01 RX ADMIN — MEMANTINE HYDROCHLORIDE 10 MILLIGRAM(S): 10 TABLET ORAL at 05:45

## 2019-07-01 RX ADMIN — Medication 81 MILLIGRAM(S): at 13:20

## 2019-07-01 RX ADMIN — LOSARTAN POTASSIUM 25 MILLIGRAM(S): 100 TABLET, FILM COATED ORAL at 05:45

## 2019-07-01 RX ADMIN — DONEPEZIL HYDROCHLORIDE 10 MILLIGRAM(S): 10 TABLET, FILM COATED ORAL at 21:12

## 2019-07-01 RX ADMIN — Medication 650 MILLIGRAM(S): at 05:45

## 2019-07-01 RX ADMIN — CEFEPIME 100 MILLIGRAM(S): 1 INJECTION, POWDER, FOR SOLUTION INTRAMUSCULAR; INTRAVENOUS at 05:45

## 2019-07-01 RX ADMIN — CHLORHEXIDINE GLUCONATE 1 APPLICATION(S): 213 SOLUTION TOPICAL at 09:31

## 2019-07-01 NOTE — PROGRESS NOTE ADULT - SUBJECTIVE AND OBJECTIVE BOX
Hospital Day:  10d    Subjective:    Patient is a 89y old  Female who presents with a mechanical fall complicated by pubic rami fracture. Patient had no acute events overnight.  Bladder scan done this morning shows retention of 247 cc of urine.  This morning she is more alert. She denies fever, chills, SOB, CP, abdominal pain, N/V, diarrhea or constipation.      Past Medical Hx:   Hyperlipidemia  Hypertension  Constipation  TIA (transient ischemic attack)  Urinary retention  No pertinent past medical history    Past Sx:  No significant past surgical history    Allergies:  No Known Allergies    Current Meds:   Standng Meds:  acetaminophen   Tablet .. 650 milliGRAM(s) Oral every 6 hours  aspirin  chewable 81 milliGRAM(s) Oral daily  atorvastatin 20 milliGRAM(s) Oral at bedtime  cefepime   IVPB 500 milliGRAM(s) IV Intermittent every 12 hours  chlorhexidine 4% Liquid 1 Application(s) Topical <User Schedule>  dextrose 5%. 1000 milliLiter(s) (50 mL/Hr) IV Continuous <Continuous>  donepezil 10 milliGRAM(s) Oral at bedtime  haloperidol    Concentrate 1 milliGRAM(s) Oral at bedtime  losartan 25 milliGRAM(s) Oral daily  memantine 10 milliGRAM(s) Oral two times a day  pantoprazole    Tablet 40 milliGRAM(s) Oral before breakfast    PRN Meds:  haloperidol    Concentrate 1 milliGRAM(s) Oral every 6 hours PRN agitation    HOME MEDICATIONS:  aspirin 81 mg oral tablet: 1 tab(s) orally once a day  ciprofloxacin 250 mg oral tablet: 1 tab(s) orally 2 times a day please stop on 2019  donepezil 10 mg oral tablet: 1 tab(s) orally once a day (at bedtime)  irbesartan-hydrochlorothiazide 150mg-12.5mg oral tablet: 1 tab(s) orally once a day  LORazepam 0.5 mg oral tablet: 1 tab(s) orally 3 times a day, As Needed  lovastatin 20 mg oral tablet: 1 tab(s) orally once a day  memantine 10 mg oral tablet: 1 tab(s) orally 2 times a day  traZODone 50 mg oral tablet: 0.5 tab(s) orally once a day, As Needed      Vital Signs:   T(F): 97.4 (19 @ 07:18), Max: 97.6 (19 @ 00:00)  HR: 77 (19 @ 07:18) (77 - 82)  BP: 128/58 (19 @ 07:18) (119/52 - 128/58)  RR: 18 (19 @ 07:18) (18 - 18)  SpO2: --      19 @ 07:01  -  19 @ 07:00  --------------------------------------------------------  IN: 450 mL / OUT: 300 mL / NET: 150 mL    19 @ 07:01  -  19 @ 16:07  --------------------------------------------------------  IN: 360 mL / OUT: 0 mL / NET: 360 mL        Physical Exam:   GENERAL: NAD  HEENT: NCAT  CHEST/LUNG: CTA b/l  HEART: Regular rate and rhythm; s1 s2 appreciated, No murmurs, rubs, or gallops  ABDOMEN: Soft, Nontender, Nondistended; Bowel sounds present   EXTREMITIES: No LE edema b/l  Genitourinary: Raman cath in place, minimal urine w/ johnny blood & no coths  NERVOUS SYSTEM:  Alert & Oriented X3        Labs:                         7.2    12.79 )-----------( 228      ( 2019 06:20 )             22.8     Neutophil% 67.6, Lymphocyte% 19.9, Monocyte% 8.1, Bands% 1.7 19 @ 06:20    2019 06:20    142    |  109    |  34     ----------------------------<  103    4.1     |  21     |  1.0      Ca    8.1        2019 06:20    Troponin --, CKMB --,  19 @ 01:13  Urinalysis Basic - ( 2019 07:33 )    Color: Red / Appearance: Turbid / S.020 / pH: x  Gluc: x / Ketone: 15  / Bili: Large / Urobili: 1.0 mg/dL   Blood: x / Protein: 100 mg/dL / Nitrite: Positive   Leuk Esterase: Large / RBC: >50 /HPF / WBC >50 /HPF   Sq Epi: x / Non Sq Epi: x / Bacteria: Many /HPF    Radiology:

## 2019-07-01 NOTE — PROGRESS NOTE ADULT - ASSESSMENT
Assessment and Plan:    89yo F with PMH of HTN, Hypercholestremia, and Dementia s/p mechanical fall w/ acute fractures of the left superior and inferior pubic ramus, as well as left distal radius fracture. Hospital course complicated by one episode unresposiveness, resolved spontaneously, JAIDA 2/2 to obstruction s/p nuñez w/ Cr improvement, no with UTI and hematuria, acute blood loss anemia s/p 1 unit pRBC. Patient remains in stable condition    #) UTI with JAIDA on CKD, stable  - Urine cx + for E Coli & Morganella Morganii  - c/w  Cefepime IV 500mg q12 (renally dosed due to GFR of 41)  - will adjust Abx based on sensitivities (pending for M. Morganii)  - c/w nuñez for now  - bladder U/S shows b/l renal cysts, no hydroneprhosis    #) acute blood loss anemia, stable   - s/p 1u of pRBCs for Hgb of 6.7. on 6/29  - AM Hgb remains stable at 7.2  - Will consult urology as hematuria is persistent  - will continue to follow CBC    #)S/p fall and acute fracture of the left superior and inferior pubic rami, Lt distal radius fracture s/p splint for wrist, stable  - currently stable  - OOB chair with nursing  - c/w Pain control (acetaminophen 650mg PO Q6)  - f/u with ortho as OP    #)Episode of unresponsiveness -seizure ruled out, improved  -CT scan of head negative  -EEG negative for seizure  -MRI shows significant volume loss    #)HTN, stable-c/w losartan     #)Dementia, stable-c/w donepezil and memantine    #)DLD, stable-c/w lipitor      GI/ ppx: hold Lovenox due to persistent hematuria  Code Status: DNR/DNI  DISPO: pending SNF placement

## 2019-07-01 NOTE — PROGRESS NOTE ADULT - SUBJECTIVE AND OBJECTIVE BOX
Hospital Day:  10d    Subjective:    Patient is a 89y old  Female who presents with a chief complaint of s/p trip and fall (2019 13:34)      Past Medical Hx:   Hyperlipidemia  Hypertension  Constipation  TIA (transient ischemic attack)  Urinary retention  No pertinent past medical history    Past Sx:  No significant past surgical history    Allergies:  No Known Allergies    Current Meds:   Standng Meds:  acetaminophen   Tablet .. 650 milliGRAM(s) Oral every 6 hours  aspirin  chewable 81 milliGRAM(s) Oral daily  atorvastatin 20 milliGRAM(s) Oral at bedtime  cefepime   IVPB 500 milliGRAM(s) IV Intermittent every 12 hours  chlorhexidine 4% Liquid 1 Application(s) Topical <User Schedule>  dextrose 5%. 1000 milliLiter(s) (50 mL/Hr) IV Continuous <Continuous>  donepezil 10 milliGRAM(s) Oral at bedtime  haloperidol    Concentrate 1 milliGRAM(s) Oral at bedtime  losartan 25 milliGRAM(s) Oral daily  memantine 10 milliGRAM(s) Oral two times a day  pantoprazole    Tablet 40 milliGRAM(s) Oral before breakfast    PRN Meds:  haloperidol    Concentrate 1 milliGRAM(s) Oral every 6 hours PRN agitation    HOME MEDICATIONS:  aspirin 81 mg oral tablet: 1 tab(s) orally once a day  ciprofloxacin 250 mg oral tablet: 1 tab(s) orally 2 times a day please stop on 2019  donepezil 10 mg oral tablet: 1 tab(s) orally once a day (at bedtime)  irbesartan-hydrochlorothiazide 150mg-12.5mg oral tablet: 1 tab(s) orally once a day  LORazepam 0.5 mg oral tablet: 1 tab(s) orally 3 times a day, As Needed  lovastatin 20 mg oral tablet: 1 tab(s) orally once a day  memantine 10 mg oral tablet: 1 tab(s) orally 2 times a day  traZODone 50 mg oral tablet: 0.5 tab(s) orally once a day, As Needed      Vital Signs:   T(F): 97.4 (19 @ 07:18), Max: 98.7 (19 @ 15:45)  HR: 77 (19 @ 07:18) (77 - 85)  BP: 128/58 (19 @ 07:18) (119/52 - 141/62)  RR: 18 (19 @ 07:18) (18 - 18)  SpO2: --      19 @ 07:01  -  19 @ 07:00  --------------------------------------------------------  IN: 450 mL / OUT: 300 mL / NET: 150 mL        Physical Exam:   GENERAL: NAD  HEENT: NCAT  CHEST/LUNG: CTAB  HEART: Regular rate and rhythm; s1 s2 appreciated, No murmurs, rubs, or gallops  ABDOMEN: Soft, Nontender, Nondistended; Bowel sounds present  EXTREMITIES: No LE edema b/l  NERVOUS SYSTEM:  Alert & Oriented X3        Labs:                         7.2    12.79 )-----------( 228      ( 2019 06:20 )             22.8     Neutophil% 67.6, Lymphocyte% 19.9, Monocyte% 8.1, Bands% 1.7 19 @ 06:20    2019 06:20    142    |  109    |  34     ----------------------------<  103    4.1     |  21     |  1.0      Ca    8.1        2019 06:20                Troponin --, CKMB --,  19 @ 01:13        Urinalysis Basic - ( 2019 07:33 )    Color: Red / Appearance: Turbid / S.020 / pH: x  Gluc: x / Ketone: 15  / Bili: Large / Urobili: 1.0 mg/dL   Blood: x / Protein: 100 mg/dL / Nitrite: Positive   Leuk Esterase: Large / RBC: >50 /HPF / WBC >50 /HPF   Sq Epi: x / Non Sq Epi: x / Bacteria: Many /HPF        Radiology:      Assessment and Plan:     DVT ppx:    GI ppx:     Code Status:     DISPO:

## 2019-07-01 NOTE — PROGRESS NOTE ADULT - SUBJECTIVE AND OBJECTIVE BOX
GARETH CUEVAS MRN-[id number]    Hospitalist Note  89yo F with Past Medical History HTN, Hypercholestremia, and Dementia admitted status post fall complicated by acute fractures of the left superior and inferior pubic ramus, as well as left distal radius fracture.  Status post splinting by orthopedic surgery.  Her clinical course was complicated by an episode of unresponsiveness on 6/23/19 rule out seizure.    Overnight events/Updates: Hemoglobin increased to 7.2 following transfusion.  Urine cultures were positive for gram negatives.  The patient suffers from persistent hematuria.     Vital Signs Last 24 Hrs  T(C): 36.3 (01 Jul 2019 07:18), Max: 36.4 (01 Jul 2019 00:00)  T(F): 97.4 (01 Jul 2019 07:18), Max: 97.6 (01 Jul 2019 00:00)  HR: 77 (01 Jul 2019 07:18) (77 - 82)  BP: 128/58 (01 Jul 2019 07:18) (119/52 - 128/58)  RR: 18 (01 Jul 2019 07:18) (18 - 18)      Physical Examination:  General: AAO x 3  HEENT: PERRLA, EOMI  CV= S1 & S2 appreciated  Lungs= CTA BL  Abdominal Examination= + BS, Soft, NT/ND  Extremity Examination= No C/C/E    ROS: No chest pain, no shortness of breath.  All other systems reviewed and are within normal limits except for the complaints in the HPI.    MEDICATIONS  (STANDING):  acetaminophen   Tablet .. 650 milliGRAM(s) Oral every 6 hours  aspirin  chewable 81 milliGRAM(s) Oral daily  atorvastatin 20 milliGRAM(s) Oral at bedtime  cefepime   IVPB 500 milliGRAM(s) IV Intermittent every 12 hours  chlorhexidine 4% Liquid 1 Application(s) Topical <User Schedule>  dextrose 5%. 1000 milliLiter(s) (50 mL/Hr) IV Continuous <Continuous>  donepezil 10 milliGRAM(s) Oral at bedtime  haloperidol    Concentrate 1 milliGRAM(s) Oral at bedtime  losartan 25 milliGRAM(s) Oral daily  memantine 10 milliGRAM(s) Oral two times a day  pantoprazole    Tablet 40 milliGRAM(s) Oral before breakfast    MEDICATIONS  (PRN):  haloperidol    Concentrate 1 milliGRAM(s) Oral every 6 hours PRN agitation    LAB -                         7.2    14.97 )-----------( 215      ( 30 Jun 2019 05:32 )             22.5     06-30    148<H>  |  116<H>  |  34<H>  ----------------------------<  121<H>  4.1   |  22  |  1.1    Ca    8.2<L>      30 Jun 2019 05:32

## 2019-07-02 LAB
-  AMIKACIN: SIGNIFICANT CHANGE UP
-  AMOXICILLIN/CLAVULANIC ACID: SIGNIFICANT CHANGE UP
-  AMPICILLIN/SULBACTAM: SIGNIFICANT CHANGE UP
-  AMPICILLIN: SIGNIFICANT CHANGE UP
-  AZTREONAM: SIGNIFICANT CHANGE UP
-  CEFAZOLIN: SIGNIFICANT CHANGE UP
-  CEFEPIME: SIGNIFICANT CHANGE UP
-  CEFOTAXIME: SIGNIFICANT CHANGE UP
-  CEFOXITIN: SIGNIFICANT CHANGE UP
-  CEFTAZIDIME: SIGNIFICANT CHANGE UP
-  CEFTRIAXONE: SIGNIFICANT CHANGE UP
-  CEFUROXIME: SIGNIFICANT CHANGE UP
-  CEPHALOTHIN: SIGNIFICANT CHANGE UP
-  CIPROFLOXACIN: SIGNIFICANT CHANGE UP
-  ERTAPENEM: SIGNIFICANT CHANGE UP
-  GENTAMICIN: SIGNIFICANT CHANGE UP
-  IMIPENEM: SIGNIFICANT CHANGE UP
-  LEVOFLOXACIN: SIGNIFICANT CHANGE UP
-  MEROPENEM: SIGNIFICANT CHANGE UP
-  NITROFURANTOIN: SIGNIFICANT CHANGE UP
-  PIPERACILLIN/TAZOBACTAM: SIGNIFICANT CHANGE UP
-  TETRACYCLINE: SIGNIFICANT CHANGE UP
-  TIGECYCLINE: SIGNIFICANT CHANGE UP
-  TOBRAMYCIN: SIGNIFICANT CHANGE UP
-  TRIMETHOPRIM/SULFAMETHOXAZOLE: SIGNIFICANT CHANGE UP
ALBUMIN SERPL ELPH-MCNC: 2.4 G/DL — LOW (ref 3.5–5.2)
ALP SERPL-CCNC: 141 U/L — HIGH (ref 30–115)
ALT FLD-CCNC: 40 U/L — SIGNIFICANT CHANGE UP (ref 0–41)
ANION GAP SERPL CALC-SCNC: 14 MMOL/L — SIGNIFICANT CHANGE UP (ref 7–14)
AST SERPL-CCNC: 46 U/L — HIGH (ref 0–41)
BASOPHILS # BLD AUTO: 0.04 K/UL — SIGNIFICANT CHANGE UP (ref 0–0.2)
BASOPHILS NFR BLD AUTO: 0.3 % — SIGNIFICANT CHANGE UP (ref 0–1)
BILIRUB SERPL-MCNC: 0.8 MG/DL — SIGNIFICANT CHANGE UP (ref 0.2–1.2)
BUN SERPL-MCNC: 39 MG/DL — HIGH (ref 10–20)
CALCIUM SERPL-MCNC: 7.9 MG/DL — LOW (ref 8.5–10.1)
CHLORIDE SERPL-SCNC: 103 MMOL/L — SIGNIFICANT CHANGE UP (ref 98–110)
CO2 SERPL-SCNC: 20 MMOL/L — SIGNIFICANT CHANGE UP (ref 17–32)
CREAT SERPL-MCNC: 1 MG/DL — SIGNIFICANT CHANGE UP (ref 0.7–1.5)
CULTURE RESULTS: SIGNIFICANT CHANGE UP
EOSINOPHIL # BLD AUTO: 0.24 K/UL — SIGNIFICANT CHANGE UP (ref 0–0.7)
EOSINOPHIL NFR BLD AUTO: 1.9 % — SIGNIFICANT CHANGE UP (ref 0–8)
GLUCOSE BLDC GLUCOMTR-MCNC: 118 MG/DL — HIGH (ref 70–99)
GLUCOSE SERPL-MCNC: 101 MG/DL — HIGH (ref 70–99)
HCT VFR BLD CALC: 21.4 % — LOW (ref 37–47)
HGB BLD-MCNC: 6.7 G/DL — CRITICAL LOW (ref 12–16)
IMM GRANULOCYTES NFR BLD AUTO: 2.1 % — HIGH (ref 0.1–0.3)
LYMPHOCYTES # BLD AUTO: 17.5 % — LOW (ref 20.5–51.1)
LYMPHOCYTES # BLD AUTO: 2.17 K/UL — SIGNIFICANT CHANGE UP (ref 1.2–3.4)
MCHC RBC-ENTMCNC: 30.7 PG — SIGNIFICANT CHANGE UP (ref 27–31)
MCHC RBC-ENTMCNC: 31.3 G/DL — LOW (ref 32–37)
MCV RBC AUTO: 98.2 FL — SIGNIFICANT CHANGE UP (ref 81–99)
METHOD TYPE: SIGNIFICANT CHANGE UP
MONOCYTES # BLD AUTO: 0.95 K/UL — HIGH (ref 0.1–0.6)
MONOCYTES NFR BLD AUTO: 7.7 % — SIGNIFICANT CHANGE UP (ref 1.7–9.3)
NEUTROPHILS # BLD AUTO: 8.75 K/UL — HIGH (ref 1.4–6.5)
NEUTROPHILS NFR BLD AUTO: 70.5 % — SIGNIFICANT CHANGE UP (ref 42.2–75.2)
NRBC # BLD: 0 /100 WBCS — SIGNIFICANT CHANGE UP (ref 0–0)
ORGANISM # SPEC MICROSCOPIC CNT: SIGNIFICANT CHANGE UP
PLATELET # BLD AUTO: 198 K/UL — SIGNIFICANT CHANGE UP (ref 130–400)
POTASSIUM SERPL-MCNC: 4.7 MMOL/L — SIGNIFICANT CHANGE UP (ref 3.5–5)
POTASSIUM SERPL-SCNC: 4.7 MMOL/L — SIGNIFICANT CHANGE UP (ref 3.5–5)
PROT SERPL-MCNC: 5.1 G/DL — LOW (ref 6–8)
RBC # BLD: 2.18 M/UL — LOW (ref 4.2–5.4)
RBC # FLD: 17.5 % — HIGH (ref 11.5–14.5)
SODIUM SERPL-SCNC: 137 MMOL/L — SIGNIFICANT CHANGE UP (ref 135–146)
SPECIMEN SOURCE: SIGNIFICANT CHANGE UP
WBC # BLD: 12.41 K/UL — HIGH (ref 4.8–10.8)
WBC # FLD AUTO: 12.41 K/UL — HIGH (ref 4.8–10.8)

## 2019-07-02 PROCEDURE — 99221 1ST HOSP IP/OBS SF/LOW 40: CPT

## 2019-07-02 PROCEDURE — 99233 SBSQ HOSP IP/OBS HIGH 50: CPT

## 2019-07-02 RX ORDER — TAMSULOSIN HYDROCHLORIDE 0.4 MG/1
0.4 CAPSULE ORAL AT BEDTIME
Refills: 0 | Status: DISCONTINUED | OUTPATIENT
Start: 2019-07-02 | End: 2019-07-03

## 2019-07-02 RX ORDER — CEFTRIAXONE 500 MG/1
1000 INJECTION, POWDER, FOR SOLUTION INTRAMUSCULAR; INTRAVENOUS EVERY 24 HOURS
Refills: 0 | Status: DISCONTINUED | OUTPATIENT
Start: 2019-07-02 | End: 2019-07-03

## 2019-07-02 RX ADMIN — DONEPEZIL HYDROCHLORIDE 10 MILLIGRAM(S): 10 TABLET, FILM COATED ORAL at 21:06

## 2019-07-02 RX ADMIN — MEMANTINE HYDROCHLORIDE 10 MILLIGRAM(S): 10 TABLET ORAL at 05:10

## 2019-07-02 RX ADMIN — Medication 650 MILLIGRAM(S): at 17:20

## 2019-07-02 RX ADMIN — Medication 650 MILLIGRAM(S): at 05:10

## 2019-07-02 RX ADMIN — CEFEPIME 100 MILLIGRAM(S): 1 INJECTION, POWDER, FOR SOLUTION INTRAMUSCULAR; INTRAVENOUS at 05:12

## 2019-07-02 RX ADMIN — PANTOPRAZOLE SODIUM 40 MILLIGRAM(S): 20 TABLET, DELAYED RELEASE ORAL at 05:10

## 2019-07-02 RX ADMIN — ATORVASTATIN CALCIUM 20 MILLIGRAM(S): 80 TABLET, FILM COATED ORAL at 21:06

## 2019-07-02 RX ADMIN — Medication 650 MILLIGRAM(S): at 13:31

## 2019-07-02 RX ADMIN — TAMSULOSIN HYDROCHLORIDE 0.4 MILLIGRAM(S): 0.4 CAPSULE ORAL at 21:06

## 2019-07-02 RX ADMIN — SODIUM CHLORIDE 50 MILLILITER(S): 9 INJECTION, SOLUTION INTRAVENOUS at 05:12

## 2019-07-02 RX ADMIN — Medication 650 MILLIGRAM(S): at 00:28

## 2019-07-02 RX ADMIN — Medication 650 MILLIGRAM(S): at 23:29

## 2019-07-02 RX ADMIN — Medication 650 MILLIGRAM(S): at 13:28

## 2019-07-02 RX ADMIN — CEFTRIAXONE 100 MILLIGRAM(S): 500 INJECTION, POWDER, FOR SOLUTION INTRAMUSCULAR; INTRAVENOUS at 13:29

## 2019-07-02 RX ADMIN — Medication 650 MILLIGRAM(S): at 17:19

## 2019-07-02 RX ADMIN — Medication 650 MILLIGRAM(S): at 01:06

## 2019-07-02 RX ADMIN — MEMANTINE HYDROCHLORIDE 10 MILLIGRAM(S): 10 TABLET ORAL at 17:20

## 2019-07-02 RX ADMIN — HALOPERIDOL DECANOATE 1 MILLIGRAM(S): 100 INJECTION INTRAMUSCULAR at 21:47

## 2019-07-02 RX ADMIN — HALOPERIDOL DECANOATE 1 MILLIGRAM(S): 100 INJECTION INTRAMUSCULAR at 15:42

## 2019-07-02 RX ADMIN — CHLORHEXIDINE GLUCONATE 1 APPLICATION(S): 213 SOLUTION TOPICAL at 05:11

## 2019-07-02 RX ADMIN — Medication 81 MILLIGRAM(S): at 13:28

## 2019-07-02 NOTE — CONSULT NOTE ADULT - ASSESSMENT
89 yo female PMH hyperlipidemia, HTN, TIA, UTI admitted on 6/21/10 (HD #11) s/p mechanical fall found to have acute fracture of the left superior and inferior pubic rami, Lt distal radius fracture. Now being treated for:    1)acute blood loss anemia  2)hematuria  3) UTI +urine Cx Ecoli and morgenlla (on rocephin)  4) Urinary retention  5) Pelvic fx and left distal radius fracture     Recommendations:    - Hematuria is improving likely from UTI  - cont IVF  - Cont IV rocephin  - Pedro Pablo nobles attending and follow 89 yo female PMH hyperlipidemia, HTN, TIA, UTI admitted on 6/21/10 (HD #11) s/p mechanical fall found to have acute fracture of the left superior and inferior pubic rami, Lt distal radius fracture. Now being treated for:    1)acute blood loss anemia  2)hematuria  3) UTI +urine Cx Ecoli and morgenlla (on rocephin)  4) Urinary retention  5) Pelvic fx and left distal radius fracture     Recommendations:    - Hematuria is improving likely from UTI  - cont IVF  - Cont IV rocephin  - remove nuñez and start CIC (intermittent catheterization) Q8h, if patient does not tolerate may need SPT by IR  - patient seen and examined with Dr Vanessa 89 yo female PMH hyperlipidemia, HTN, TIA, UTI admitted on 6/21/10 (HD #11) s/p mechanical fall found to have acute fracture of the left superior and inferior pubic rami, Lt distal radius fracture. Now being treated for:    1)acute blood loss anemia  2)hematuria  3) UTI +urine Cx Ecoli and morgenlla (on rocephin)  4) Urinary retention  5) Pelvic fx and left distal radius fracture     Recommendations:    - Hematuria is improving likely from UTI  - cont IVF  - Cont IV rocephin  - remove nuñez and start CIC (intermittent catheterization) Q8h, if patient does not tolerate may need SPT by IR  - patient seen and examined with Dr Vanessa    I spoke with the daughter is until now the family’s been refusing CIC. I explained that though there is not the absence of a risk of infection given that she is not urinating and that as long as the catheter is in we can tell if she begins to urinate the risk of infection is less with the catheter and she may start avoiding. If she cannot void then in the short term we might leave her with a Nuñez catheter but long-term, if she does not void once she’s feeling better if she gets better, a suprapubic tube would be the safest thing. The daughter agreed after the discussion and this was communicated to the medical attending and nursing staff.

## 2019-07-02 NOTE — PROGRESS NOTE ADULT - SUBJECTIVE AND OBJECTIVE BOX
GARETH CUEVAS MRN-[id number]    Hospitalist Note  89yo F with Past Medical History HTN, Hypercholestremia, and Dementia admitted status post fall complicated by acute fractures of the left superior and inferior pubic ramus, as well as left distal radius fracture.  Status post splinting by orthopedic surgery.  Her clinical course was complicated by an episode of unresponsiveness on 6/23/19 rule out seizure.    Overnight events/Updates: Hemoglobin worsened to 6.7 despite blood transfusion.  Hematuria has resolved over the past 24 hours.  Urine cultures were positive for pan-sensitive E.coli.      Vital Signs Last 24 Hrs  T(C): 36.1 (02 Jul 2019 10:39), Max: 36.6 (02 Jul 2019 09:51)  T(F): 97 (02 Jul 2019 10:39), Max: 97.9 (02 Jul 2019 09:51)  HR: 75 (02 Jul 2019 10:39) (75 - 96)  BP: 125/57 (02 Jul 2019 10:39) (116/56 - 139/57)  BP(mean): --  RR: 19 (02 Jul 2019 10:39) (18 - 19)  SpO2: 100% (02 Jul 2019 10:39) (100% - 100%)    Physical Examination:  General: AAO x 2; lethargic this AM  HEENT: PERRLA, EOMI  CV= S1 & S2 appreciated  Lungs= CTA BL  Abdominal Examination= + BS, Soft, NT/ND  Extremity Examination= + splint to the LUE hand    ROS: No chest pain, no shortness of breath.  All other systems reviewed and are within normal limits except for the complaints in the HPI.    MEDICATIONS  (STANDING):  acetaminophen   Tablet .. 650 milliGRAM(s) Oral every 6 hours  aspirin  chewable 81 milliGRAM(s) Oral daily  atorvastatin 20 milliGRAM(s) Oral at bedtime  cefTRIAXone   IVPB 1000 milliGRAM(s) IV Intermittent every 24 hours  chlorhexidine 4% Liquid 1 Application(s) Topical <User Schedule>  dextrose 5%. 1000 milliLiter(s) (50 mL/Hr) IV Continuous <Continuous>  donepezil 10 milliGRAM(s) Oral at bedtime  haloperidol    Concentrate 1 milliGRAM(s) Oral at bedtime  losartan 25 milliGRAM(s) Oral daily  memantine 10 milliGRAM(s) Oral two times a day  pantoprazole    Tablet 40 milliGRAM(s) Oral before breakfast    MEDICATIONS  (PRN):  haloperidol    Concentrate 1 milliGRAM(s) Oral every 6 hours PRN agitation                            6.7    12.41 )-----------( 198      ( 02 Jul 2019 05:28 )             21.4     07-02    137  |  103  |  39<H>  ----------------------------<  101<H>  4.7   |  20  |  1.0    Ca    7.9<L>      02 Jul 2019 05:28    TPro  5.1<L>  /  Alb  2.4<L>  /  TBili  0.8  /  DBili  x   /  AST  46<H>  /  ALT  40  /  AlkPhos  141<H>  07-02      Case discussed with housestaff & family  TAWANDA Del Cid [id number]

## 2019-07-02 NOTE — PROGRESS NOTE ADULT - ASSESSMENT
Assessment and Plan:    89yo F with PMH of HTN, Hypercholesteremia and Dementia s/p mechanical fall w/ acute fractures of the left superior and inferior pubic ramus, as well as left distal radius fracture. Hospital course complicated by one episode unresponsiveness resolved spontaneously, JAIDA on CKD 2/2 to obstruction s/p nuñez w/ Cr improvement, now with UTI and hematuria, acute blood loss anemia s/p 1 unit pRBC. Patient remains in stable condition    #) acute blood loss anemia, worsened  - s/p 1u of pRBCs for Hgb of 6.7. on 6/29  - AM Hgb was 6.7. Will transfuse 1 u pRBC  - hematuria is persistent, with mild improvement  - Will f/u w/ urology  - will f/u Hgb with AM labs    #) UTI with JAIDA on CKD, stable  - Urine cx + for E Coli & Morganella Morganii  - will switch ABx to Rocephin IV given culture sensitivities   - c/w nuñez  - bladder U/S shows b/l renal cysts, no hydroneprhosis    #)S/p fall and acute fracture of the left superior and inferior pubic rami, Lt distal radius fracture s/p splint for wrist, stable  - currently stable  - OOB chair with nursing  - c/w Pain control (acetaminophen 650mg PO Q6)  - f/u with ortho as OP    #)Episode of unresponsiveness -seizure ruled out, resolved  -CT scan of head negative  -EEG negative for seizure  -MRI shows significant volume loss    #)HTN, stable-c/w losartan     #)Dementia, stable-c/w donepezil and memantine    #)DLD, stable-c/w lipitor      GI/ ppx: hold Lovenox due to persistent hematuria  Code Status: DNR/DNI  DISPO: pending SNF placement

## 2019-07-02 NOTE — CONSULT NOTE ADULT - CONSULT REASON
Goals of care
LEFT INF/SUP RAMI FXR, L DR ORTA
left wrist fx  left pubic ramus fx
obtundation
hematuria

## 2019-07-02 NOTE — CONSULT NOTE ADULT - ATTENDING COMMENTS
I personally saw and examined the patient, reviewed the chart and available data. I discussed the situation with the  daughter, medical attending and nursing staff.. I also reviewed and/or amended the note as necessary.

## 2019-07-02 NOTE — CONSULT NOTE ADULT - SUBJECTIVE AND OBJECTIVE BOX
89 yo female PMH hyperlipidemia, HTN, TIA, UTI admitted on 6/21/10 (HD #11) s/p mechanical fall found to have acute fracture of the left superior and inferior pubic rami, Lt distal radius fracture. Patient seen and examined at bedside.  Her clinical course was complicated by an episode of unresponsiveness on 6/23/19, a routine EEG was negative for seizure activity while MRI Brain was negative for acute hemorrhage or mass effect.  In addition the patient developed worsening anemia during her hospital stay, as well as JAIDA (Cr increased to 2.1 from 0.9).  Nuñez catheter placed 6/27/19 ?650cc PVR per EMR.  Cr has since improved back to normal 0.9  However, patient has required 2U PRBC for acute blood loss anemia in past 2 days, found to have hematuria.    PAST MEDICAL & SURGICAL HISTORY:  Hyperlipidemia  Hypertension  Constipation  TIA (transient ischemic attack)  Urinary retention  No significant past surgical history     MEDICATIONS  (STANDING):  acetaminophen   Tablet .. 650 milliGRAM(s) Oral every 6 hours  aspirin  chewable 81 milliGRAM(s) Oral daily  atorvastatin 20 milliGRAM(s) Oral at bedtime  cefTRIAXone   IVPB 1000 milliGRAM(s) IV Intermittent every 24 hours  chlorhexidine 4% Liquid 1 Application(s) Topical <User Schedule>  dextrose 5%. 1000 milliLiter(s) (50 mL/Hr) IV Continuous <Continuous>  donepezil 10 milliGRAM(s) Oral at bedtime  haloperidol    Concentrate 1 milliGRAM(s) Oral at bedtime  losartan 25 milliGRAM(s) Oral daily  memantine 10 milliGRAM(s) Oral two times a day  pantoprazole    Tablet 40 milliGRAM(s) Oral before breakfast  MEDICATIONS  (PRN):  haloperidol    Concentrate 1 milliGRAM(s) Oral every 6 hours PRN agitation    Allergies  No Known Allergies  Intolerances    Vital Signs Last 24 Hrs  T(C): 36.1 (02 Jul 2019 10:39), Max: 36.6 (02 Jul 2019 09:51)  T(F): 97 (02 Jul 2019 10:39), Max: 97.9 (02 Jul 2019 09:51)  HR: 75 (02 Jul 2019 10:39) (75 - 96)  BP: 125/57 (02 Jul 2019 10:39) (116/56 - 139/57)  RR: 19 (02 Jul 2019 10:39) (18 - 19)  SpO2: 100% (02 Jul 2019 10:39) (100% - 100%)      07-02    137  |  103  |  39<H>  ----------------------------<  101<H>  4.7   |  20  |  1.0    Ca    7.9<L>      02 Jul 2019 05:28    TPro  5.1<L>  /  Alb  2.4<L>  /  TBili  0.8  /  DBili  x   /  AST  46<H>  /  ALT  40  /  AlkPhos  141<H>  07-02               6.7    12.41 )-----------( 198      ( 02 Jul 2019 05:28 )             21.4     EXAM:  CT ABDOMEN AND PELVIS IC        PROCEDURE DATE:  06/21/2019    INTERPRETATION:  CLINICAL HISTORY / REASON FOR EXAM: Pain status post   trauma    COMPARISON CT: CT abdomen pelvis from October 21, 2018  OTHER STUDIES USED FOR CORRELATION: None.   FINDINGS:  CHEST:  TUBES/LINES: None.  LUNGS, PLEURA, AND AIRWAYS: No pneumothorax. No consolidation or pleural   effusion. Central airways patent. Previously seen 4mm right lower lobe   pulmonary nodule is not visualized on this examination.  MEDIASTINUM/THORACIC NODES: No mediastinal, hilar or axillary   lymphadenopathy.  HEART/GREAT VESSELS: No pericardial effusion. Heart size is unremarkable.   Coronary artery and thoracic aorta atherosclerotic calcifications. No   aneurysmal dilation of the thoracic aorta.  ABDOMEN/PELVIS:  HEPATOBILIARY: Stable right hepatic lobe hypodensity measuring 6 mm. No   evidence of intra or extrahepatic biliary dilatation. The gallbladder is   suboptimally imaged.  SPLEN: Unremarkable.  PANCREAS: Atrophic pancreas.  ADRENAL GLANDS: Unremarkable.  KIDNEYS: Symmetric pattern of renal enhancement. No evidence of a mass,   hydronephrosis or hydroureter. Leftrenal cyst measuring 6.7 cm.   Subcentimeter hypodensities, too small to further characterize.  ABDOMINOPELVIC NODES: Unremarkable.  PELVIC ORGANS: Unremarkable.  PERITONEUM/MESENTERY/BOWEL: Colonic diverticulosis. No bowel obstruction,   ascites or intraperitoneal free air.  BONES/SOFT TISSUES: Acute fractures of the left superior and inferior   pubic rami. Diffuse osteopenia. Degenerative changes of thoracolumbar   spine.  VASCULAR: Calcified atherosclerotic disease of the abdominal aorta.  IMPRESSION:   Acute fractures of the left superior and inferior pubic rami.  OLENA JOHNSON M.D., RESIDENT RADIOLOGIST  This document has been electronically signed.  JADEN CAMACHO M.D., ATTENDING RADIOLOGIST  This document has been electronically signed. Jun 21 2019  6:58AM      EXAM:  US KIDNEYS AND BLADDER        PROCEDURE DATE:  06/27/2019  INTERPRETATION:  CLINICAL HISTORY: Renal cysts  Correlation: CT abdomen and pelvis June 21, 2019.  PROCEDURE: Retroperitoneal Ultrasound was performed.  FINDINGS:  RIGHT KIDNEY: Normal in echogenicity, size measuring 10.5 cm in length   containing cysts the largest of which measures 1.6 cm in its greatest   diameter. No evidence of hydronephrosis, calculus, perinephric fluid or   solid mass. Vascular flow isdemonstrated at the hilum.  LEFT KIDNEY: Normal in echogenicity, size measuring 13.1 cm in length   containing cysts the largest of which measures 6.6 cm in its greatest   diameter.. No evidence of hydronephrosis, calculus, perinephric fluid or   solid mass. Vascular flow is demonstrated at the hilum.   URINARY BLADDER: Not filled. Nuñez catheter history.   IMPRESSION:  1.  No hydronephrosis. Bilateral renal cysts correlating with CT   examination.  2.  Nondiagnostic examination of the urinary bladder.    ELAINE BRADSHAW M.D., ATTENDING RADIOLOGIST  This document has been electronically signed. Jun 28 2019  8:10AM    Urine Microscopic-Add On (NC) (06.28.19 @ 07:33)    Red Blood Cell - Urine: >50 /HPF    White Blood Cell - Urine: >50 /HPF    Bacteria: Many /HPF    Culture - Urine (06.28.19 @ 07:33)    -  Amikacin: S <=16    -  Amikacin: S <=8    -  Amoxicillin/Clavulanic Acid: R >16/8    -  Ampicillin: S <=8 These ampicillin results predict results for amoxicillin    -  Ampicillin: R >16 These ampicillin results predict results for amoxicillin    -  Ampicillin/Sulbactam: R >16/8 Enterobacter, Citrobacter, and Serratia may develop resistance during prolonged therapy (3-4 days)    -  Ampicillin/Sulbactam: S <=8/4 Enterobacter, Citrobacter, and Serratia may develop resistance during prolonged therapy (3-4 days)    -  Aztreonam: S <=4    -  Aztreonam: S <=4    -  Cefazolin: R >16    -  Cefepime: S <=4    -  Cefepime: S <=2    -  Cefotaxime: S <=2    -  Cefoxitin: S <=8    -  Cefoxitin: S 8    -  Ceftazidime: S <=1    -  Ceftriaxone: S <=1 Enterobacter, Citrobacter, and Serratia may develop resistance during prolonged therapy    -  Ceftriaxone: S <=1 Enterobacter, Citrobacter, and Serratia may develop resistance during prolonged therapy    -  Cefuroxime: R >16    -  Cephalothin: R >16    -  Ciprofloxacin: S <=1    -  Ciprofloxacin: S <=0.5    -  Ertapenem: S <=1    -  Ertapenem: S <=0.5    -  Gentamicin: S <=4    -  Gentamicin: S <=1    -  Imipenem: S <=1    -  Imipenem: R 4    -  Levofloxacin: S <=2    -  Levofloxacin: S <=1    -  Meropenem: S <=1    -  Meropenem: S <=1    -  Nitrofurantoin: S <=32 Should not be used to treat pyelonephritis    -  Nitrofurantoin: R 64 Should not be used to treat pyelonephritis    -  Piperacillin/Tazobactam: S <=16    -  Piperacillin/Tazobactam: S <=8    -  Tetra/Doxy: R <=2    -  Tigecycline: S <=2    -  Tigecycline: R <=1    -  Tobramycin: S <=4    -  Tobramycin: S <=2    -  Trimethoprim/Sulfamethoxazole: S <=2/38    -  Trimethoprim/Sulfamethoxazole: S <=0.5/9.5    Specimen Source: .Urine Catheterized    Culture Results:   >100,000 CFU/ml Escherichia coli  >100,000 CFU/ml Morganella morganii          Physical Exam  General: elderly female, awake and alert, no acute distress  ABdomen: soft, nontender, nondistended, no suprapubic tenderness  : + nuñez with clear yellow urine, +bloody/dark sediments

## 2019-07-02 NOTE — PROGRESS NOTE ADULT - ASSESSMENT
89yo F with Past Medical History HTN, Hypercholestremia, and Dementia admitted status post fall complicated by acute fractures of the left superior and inferior pubic ramus, as well as left distal radius fracture.     Fall complicated by superior/inferior pubic ramus fracture & left distal radius fracture: status post splinting to the left upper extremity.  No new complaints.  Continue Tylenol PRN for pain.  OOB to chair with nursing.  Acute cystitis secondary to nuñez catheter: likely related to nuñez.  Deescalate abx to Rocephin 1gm IV q24.    Acute blood loss anemia: hemoglobin decreased to 6.7.  Transfuse 1u PRBC this afternoon, and consult urology due to persistent hematuria.  AMS rule out seizures: patient was transferred from Surgery to the Medical Service for altered mental status evaluation.  EEG was negative for seizures.  MRI revealed significant volume loss.   The pt suffers from a flat affect though responds approximately.  Hypertension: continue Losartan 25mg PO q24  Hypercholesteremia: continue Lipitor 20mg QHS  Dementia: continue Memantine and Donepezil as directed  GI/DVT prophylaxis: hold Lovenox due to persistent hematuria    #Progress Note Handoff    Pending:  Consults: Urology  Other: 1u PRBC transfusion    Future Disposition: SNF placement once hematuria resolves

## 2019-07-02 NOTE — PROGRESS NOTE ADULT - SUBJECTIVE AND OBJECTIVE BOX
Hospital Day:  11d    Subjective:    Patient is a 89y old  Female who presents with a chief complaint of pubic pain 2/2 to mechanical fall. Overnight no acute events. This morning she has no complaints. She denies fever, chills, CP, SOB, abdominal pain, N/V, diarrhea or constipation.       Past Medical Hx:   Hyperlipidemia  Hypertension  Constipation  TIA (transient ischemic attack)  Urinary retention  No pertinent past medical history    Past Sx:  No significant past surgical history    Allergies:  No Known Allergies    Current Meds:   Standng Meds:  acetaminophen   Tablet .. 650 milliGRAM(s) Oral every 6 hours  aspirin  chewable 81 milliGRAM(s) Oral daily  atorvastatin 20 milliGRAM(s) Oral at bedtime  cefTRIAXone   IVPB 1000 milliGRAM(s) IV Intermittent every 24 hours  chlorhexidine 4% Liquid 1 Application(s) Topical <User Schedule>  dextrose 5%. 1000 milliLiter(s) (50 mL/Hr) IV Continuous <Continuous>  donepezil 10 milliGRAM(s) Oral at bedtime  haloperidol    Concentrate 1 milliGRAM(s) Oral at bedtime  losartan 25 milliGRAM(s) Oral daily  memantine 10 milliGRAM(s) Oral two times a day  pantoprazole    Tablet 40 milliGRAM(s) Oral before breakfast    PRN Meds:  haloperidol    Concentrate 1 milliGRAM(s) Oral every 6 hours PRN agitation    HOME MEDICATIONS:  aspirin 81 mg oral tablet: 1 tab(s) orally once a day  ciprofloxacin 250 mg oral tablet: 1 tab(s) orally 2 times a day please stop on 7/2/2019  donepezil 10 mg oral tablet: 1 tab(s) orally once a day (at bedtime)  irbesartan-hydrochlorothiazide 150mg-12.5mg oral tablet: 1 tab(s) orally once a day  LORazepam 0.5 mg oral tablet: 1 tab(s) orally 3 times a day, As Needed  lovastatin 20 mg oral tablet: 1 tab(s) orally once a day  memantine 10 mg oral tablet: 1 tab(s) orally 2 times a day  traZODone 50 mg oral tablet: 0.5 tab(s) orally once a day, As Needed      Vital Signs:   T(F): 97.9 (07-02-19 @ 09:51), Max: 97.9 (07-02-19 @ 09:51)  HR: 96 (07-02-19 @ 09:51) (79 - 96)  BP: 120/57 (07-02-19 @ 09:51) (116/56 - 120/57)  RR: 18 (07-02-19 @ 09:51) (18 - 18)  SpO2: 100% (07-02-19 @ 09:51) (100% - 100%)      07-01-19 @ 07:01  -  07-02-19 @ 07:00  --------------------------------------------------------  IN: 480 mL / OUT: 500 mL / NET: -20 mL    07-02-19 @ 07:01  -  07-02-19 @ 10:20  --------------------------------------------------------  IN: 120 mL / OUT: 0 mL / NET: 120 mL        Physical Exam:   GENERAL: NAD  HEENT: NCAT  CHEST/LUNG: CTA bl  HEART: Regular rate and rhythm; s1 s2 appreciated, No murmurs, rubs, or gallops  ABDOMEN: Soft, Nontender, Nondistended; Bowel sounds present  EXTREMITIES: No LE edema b/l  Genitourinary: Raman in place. Hematuria still present, but improved, no clots  NERVOUS SYSTEM:  Alert & Oriented X2        Labs:                         6.7    12.41 )-----------( 198      ( 02 Jul 2019 05:28 )             21.4     Neutophil% 70.5, Lymphocyte% 17.5, Monocyte% 7.7, Bands% 2.1 07-02-19 @ 05:28    02 Jul 2019 05:28    137    |  103    |  39     ----------------------------<  101    4.7     |  20     |  1.0      Ca    7.9        02 Jul 2019 05:28    TPro  5.1    /  Alb  2.4    /  TBili  0.8    /  DBili  x      /  AST  46     /  ALT  40     /  AlkPhos  141    02 Jul 2019 05:28      Radiology:

## 2019-07-03 VITALS
HEART RATE: 98 BPM | DIASTOLIC BLOOD PRESSURE: 74 MMHG | RESPIRATION RATE: 18 BRPM | TEMPERATURE: 97 F | SYSTOLIC BLOOD PRESSURE: 101 MMHG

## 2019-07-03 LAB
ALBUMIN SERPL ELPH-MCNC: 2.7 G/DL — LOW (ref 3.5–5.2)
ALP SERPL-CCNC: 162 U/L — HIGH (ref 30–115)
ALT FLD-CCNC: 32 U/L — SIGNIFICANT CHANGE UP (ref 0–41)
ANION GAP SERPL CALC-SCNC: 11 MMOL/L — SIGNIFICANT CHANGE UP (ref 7–14)
AST SERPL-CCNC: 29 U/L — SIGNIFICANT CHANGE UP (ref 0–41)
BASOPHILS # BLD AUTO: 0.03 K/UL — SIGNIFICANT CHANGE UP (ref 0–0.2)
BASOPHILS NFR BLD AUTO: 0.2 % — SIGNIFICANT CHANGE UP (ref 0–1)
BILIRUB SERPL-MCNC: 0.7 MG/DL — SIGNIFICANT CHANGE UP (ref 0.2–1.2)
BUN SERPL-MCNC: 35 MG/DL — HIGH (ref 10–20)
CALCIUM SERPL-MCNC: 8.2 MG/DL — LOW (ref 8.5–10.1)
CHLORIDE SERPL-SCNC: 105 MMOL/L — SIGNIFICANT CHANGE UP (ref 98–110)
CO2 SERPL-SCNC: 20 MMOL/L — SIGNIFICANT CHANGE UP (ref 17–32)
CREAT SERPL-MCNC: 0.9 MG/DL — SIGNIFICANT CHANGE UP (ref 0.7–1.5)
EOSINOPHIL # BLD AUTO: 0.17 K/UL — SIGNIFICANT CHANGE UP (ref 0–0.7)
EOSINOPHIL NFR BLD AUTO: 1.4 % — SIGNIFICANT CHANGE UP (ref 0–8)
GLUCOSE SERPL-MCNC: 115 MG/DL — HIGH (ref 70–99)
HCT VFR BLD CALC: 27.6 % — LOW (ref 37–47)
HGB BLD-MCNC: 9 G/DL — LOW (ref 12–16)
IMM GRANULOCYTES NFR BLD AUTO: 1.5 % — HIGH (ref 0.1–0.3)
LYMPHOCYTES # BLD AUTO: 1.75 K/UL — SIGNIFICANT CHANGE UP (ref 1.2–3.4)
LYMPHOCYTES # BLD AUTO: 14.2 % — LOW (ref 20.5–51.1)
MCHC RBC-ENTMCNC: 30.1 PG — SIGNIFICANT CHANGE UP (ref 27–31)
MCHC RBC-ENTMCNC: 32.6 G/DL — SIGNIFICANT CHANGE UP (ref 32–37)
MCV RBC AUTO: 92.3 FL — SIGNIFICANT CHANGE UP (ref 81–99)
MONOCYTES # BLD AUTO: 0.97 K/UL — HIGH (ref 0.1–0.6)
MONOCYTES NFR BLD AUTO: 7.9 % — SIGNIFICANT CHANGE UP (ref 1.7–9.3)
NEUTROPHILS # BLD AUTO: 9.18 K/UL — HIGH (ref 1.4–6.5)
NEUTROPHILS NFR BLD AUTO: 74.8 % — SIGNIFICANT CHANGE UP (ref 42.2–75.2)
NRBC # BLD: 0 /100 WBCS — SIGNIFICANT CHANGE UP (ref 0–0)
PLATELET # BLD AUTO: 264 K/UL — SIGNIFICANT CHANGE UP (ref 130–400)
POTASSIUM SERPL-MCNC: 4.3 MMOL/L — SIGNIFICANT CHANGE UP (ref 3.5–5)
POTASSIUM SERPL-SCNC: 4.3 MMOL/L — SIGNIFICANT CHANGE UP (ref 3.5–5)
PROT SERPL-MCNC: 5.1 G/DL — LOW (ref 6–8)
RBC # BLD: 2.99 M/UL — LOW (ref 4.2–5.4)
RBC # FLD: 18.1 % — HIGH (ref 11.5–14.5)
SODIUM SERPL-SCNC: 136 MMOL/L — SIGNIFICANT CHANGE UP (ref 135–146)
WBC # BLD: 12.29 K/UL — HIGH (ref 4.8–10.8)
WBC # FLD AUTO: 12.29 K/UL — HIGH (ref 4.8–10.8)

## 2019-07-03 PROCEDURE — 99231 SBSQ HOSP IP/OBS SF/LOW 25: CPT

## 2019-07-03 PROCEDURE — 99239 HOSP IP/OBS DSCHRG MGMT >30: CPT

## 2019-07-03 RX ORDER — HALOPERIDOL DECANOATE 100 MG/ML
1 INJECTION INTRAMUSCULAR
Qty: 30 | Refills: 0
Start: 2019-07-03 | End: 2019-08-01

## 2019-07-03 RX ORDER — LANOLIN ALCOHOL/MO/W.PET/CERES
1 CREAM (GRAM) TOPICAL
Qty: 0 | Refills: 0 | DISCHARGE
Start: 2019-07-03

## 2019-07-03 RX ORDER — CEFPODOXIME PROXETIL 100 MG
1 TABLET ORAL
Qty: 10 | Refills: 0
Start: 2019-07-03 | End: 2019-07-07

## 2019-07-03 RX ORDER — TAMSULOSIN HYDROCHLORIDE 0.4 MG/1
1 CAPSULE ORAL
Qty: 0 | Refills: 0 | DISCHARGE
Start: 2019-07-03

## 2019-07-03 RX ORDER — TRAZODONE HCL 50 MG
0.5 TABLET ORAL
Qty: 0 | Refills: 0 | DISCHARGE

## 2019-07-03 RX ORDER — CEFPODOXIME PROXETIL 100 MG
100 TABLET ORAL EVERY 12 HOURS
Refills: 0 | Status: DISCONTINUED | OUTPATIENT
Start: 2019-07-03 | End: 2019-07-03

## 2019-07-03 RX ORDER — LANOLIN ALCOHOL/MO/W.PET/CERES
5 CREAM (GRAM) TOPICAL AT BEDTIME
Refills: 0 | Status: DISCONTINUED | OUTPATIENT
Start: 2019-07-03 | End: 2019-07-03

## 2019-07-03 RX ADMIN — MEMANTINE HYDROCHLORIDE 10 MILLIGRAM(S): 10 TABLET ORAL at 05:24

## 2019-07-03 RX ADMIN — MEMANTINE HYDROCHLORIDE 10 MILLIGRAM(S): 10 TABLET ORAL at 17:17

## 2019-07-03 RX ADMIN — Medication 81 MILLIGRAM(S): at 11:35

## 2019-07-03 RX ADMIN — Medication 650 MILLIGRAM(S): at 11:35

## 2019-07-03 RX ADMIN — PANTOPRAZOLE SODIUM 40 MILLIGRAM(S): 20 TABLET, DELAYED RELEASE ORAL at 05:24

## 2019-07-03 RX ADMIN — Medication 650 MILLIGRAM(S): at 13:37

## 2019-07-03 RX ADMIN — LOSARTAN POTASSIUM 25 MILLIGRAM(S): 100 TABLET, FILM COATED ORAL at 05:25

## 2019-07-03 RX ADMIN — Medication 100 MILLIGRAM(S): at 11:34

## 2019-07-03 RX ADMIN — Medication 650 MILLIGRAM(S): at 17:17

## 2019-07-03 RX ADMIN — Medication 650 MILLIGRAM(S): at 05:24

## 2019-07-03 RX ADMIN — Medication 100 MILLIGRAM(S): at 17:17

## 2019-07-03 RX ADMIN — CHLORHEXIDINE GLUCONATE 1 APPLICATION(S): 213 SOLUTION TOPICAL at 05:25

## 2019-07-03 RX ADMIN — Medication 650 MILLIGRAM(S): at 17:18

## 2019-07-03 NOTE — PROGRESS NOTE ADULT - SUBJECTIVE AND OBJECTIVE BOX
Pt. seen and examined at bedside in [facility], Raman catheter removed this AM, started on CIC. Pt.s daughter at bedside.  Explained that if Pt. is not able to tolerate CIC well, Pt. might be a candidate for SPT. Pt's daughter will consider that option, will d/w Family. Afebrile.     Vital Signs Last 24 Hrs  T(C): 36.1 (03 Jul 2019 07:29), Max: 36.6 (02 Jul 2019 09:51)  T(F): 96.9 (03 Jul 2019 07:29), Max: 97.9 (02 Jul 2019 09:51)  HR: 77 (03 Jul 2019 07:29) (75 - 106)  BP: 144/63 (03 Jul 2019 07:29) (117/55 - 144/63)  RR: 18 (03 Jul 2019 07:29) (18 - 19)  SpO2: 100% (02 Jul 2019 10:39) (100% - 100%)    PE:  General: Awake and Alert in [facility]  HEENT: NC/AT, EOMI  Resp: No accessory muscle use  Abd: Soft, NT,  Mildly distended bladder   Extremities: SALAS x4    I&O's Detail    02 Jul 2019 07:01  -  03 Jul 2019 07:00  --------------------------------------------------------  IN:    dextrose 5%.: 500 mL    Oral Fluid: 480 mL  Total IN: 980 mL    OUT:    Indwelling Catheter - Urethral: 400 mL    Intermittent Catheterization - Urethral: 1200 mL  Total OUT: 1600 mL    Total NET: -620 mL      LABS:                        9.0    12.29 )-----------( 264      ( 03 Jul 2019 05:56 )             27.6     07-03    136  |  105  |  35<H>  ----------------------------<  115<H>  4.3   |  20  |  0.9    Ca    8.2<L>      03 Jul 2019 05:56    TPro  5.1<L>  /  Alb  2.7<L>  /  TBili  0.7  /  DBili  x   /  AST  29  /  ALT  32  /  AlkPhos  162<H>  07-03    Urinalysis (06.28.19 @ 07:33)    Blood, Urine: Large    Glucose Qualitative, Urine: Negative mg/dL    pH Urine: 6.0    Color: Red    Urine Appearance: Turbid    Bilirubin: Large    Ketone - Urine: 15    Specific Gravity: 1.020    Protein, Urine: 100 mg/dL    Urobilinogen: 1.0 mg/dL    Nitrite: Positive    Leukocyte Esterase Concentration: Large    Culture - Urine (06.28.19 @ 07:33)    -  Amikacin: S <=16    -  Amikacin: S <=8    -  Amoxicillin/Clavulanic Acid: R >16/8    -  Ampicillin: S <=8 These ampicillin results predict results for amoxicillin    -  Ampicillin: R >16 These ampicillin results predict results for amoxicillin    -  Ampicillin/Sulbactam: R >16/8 Enterobacter, Citrobacter, and Serratia may develop resistance during prolonged therapy (3-4 days)    -  Ampicillin/Sulbactam: S <=8/4 Enterobacter, Citrobacter, and Serratia may develop resistance during prolonged therapy (3-4 days)    -  Aztreonam: S <=4    -  Aztreonam: S <=4    -  Cefazolin: R >16    -  Cefepime: S <=4    -  Cefepime: S <=2    -  Cefotaxime: S <=2    -  Cefoxitin: S <=8    -  Cefoxitin: S 8    -  Ceftazidime: S <=1    -  Ceftriaxone: S <=1 Enterobacter, Citrobacter, and Serratia may develop resistance during prolonged therapy    -  Ceftriaxone: S <=1 Enterobacter, Citrobacter, and Serratia may develop resistance during prolonged therapy    -  Cefuroxime: R >16    -  Cephalothin: R >16    -  Ciprofloxacin: S <=1    -  Ciprofloxacin: S <=0.5    -  Ertapenem: S <=1    -  Ertapenem: S <=0.5    -  Gentamicin: S <=4    -  Gentamicin: S <=1    -  Imipenem: S <=1    -  Imipenem: R 4    -  Levofloxacin: S <=2    -  Levofloxacin: S <=1    -  Meropenem: S <=1    -  Meropenem: S <=1    -  Nitrofurantoin: S <=32 Should not be used to treat pyelonephritis    -  Nitrofurantoin: R 64 Should not be used to treat pyelonephritis    -  Piperacillin/Tazobactam: S <=16    -  Piperacillin/Tazobactam: S <=8    -  Tetra/Doxy: R <=2    -  Tigecycline: S <=2    -  Tigecycline: R <=1    -  Tobramycin: S <=4    -  Tobramycin: S <=2    -  Trimethoprim/Sulfamethoxazole: S <=2/38    -  Trimethoprim/Sulfamethoxazole: S <=0.5/9.5    Specimen Source: .Urine Catheterized    Culture Results:   >100,000 CFU/ml Escherichia coli  >100,000 CFU/ml Morganella morganii    Organism Identification: Escherichia coli  Morganella morganii    Organism: Escherichia coli    Organism: Morganella morganii    Method Type: ATIF    Method Type: ATIF      RADIOLOGY & ADDITIONAL STUDIES:   < from: US Kidney and Bladder (06.27.19 @ 22:29) >    EXAM:  US KIDNEYS AND BLADDER            PROCEDURE DATE:  06/27/2019            INTERPRETATION:  CLINICAL HISTORY: Renal cysts    Correlation: CT abdomen and pelvis June 21, 2019.    PROCEDURE: Retroperitoneal Ultrasound was performed.    FINDINGS:    RIGHT KIDNEY: Normal in echogenicity, size measuring 10.5 cm in length   containing cysts the largest of which measures 1.6 cm in its greatest   diameter. No evidence of hydronephrosis, calculus, perinephric fluid or   solid mass. Vascular flow isdemonstrated at the hilum.    LEFT KIDNEY: Normal in echogenicity, size measuring 13.1 cm in length   containing cysts the largest of which measures 6.6 cm in its greatest   diameter.. No evidence of hydronephrosis, calculus, perinephric fluid or   solid mass. Vascular flow is demonstrated at the hilum.     URINARY BLADDER: Not filled. Raman catheter history.       IMPRESSION:  1.  No hydronephrosis. Bilateral renal cysts correlating with CT   examination.    2.  Nondiagnostic examination of the urinary bladder.    < end of copied text >

## 2019-07-03 NOTE — PROGRESS NOTE ADULT - ATTENDING COMMENTS
Pending (specify):  Hematuria - Persistent. Urology evaluation pending.   Family discussion: D/W daughter goals of care.   Family agrees for DNR / DNI but not ready for palliative care or hospice care.     Disposition: SNF when stable
awaiting snf
patient seen and evaluated  I agree with the content of the consultation  patient will require treatment of her UTI  monitor post void following voiding trial  preferred method for decompression is CIC but if not feasible then a temporary catheter diversion until patient returns to baseline functional status is appropriate  the patients hematuria is most likely related to UTI but bladder trauma associated with pelvic fracture is also a likely etiology
waxing waning mental status, likely related to dementia  awaiting dispo

## 2019-07-03 NOTE — PROGRESS NOTE ADULT - SUBJECTIVE AND OBJECTIVE BOX
Hospital Day:  12d    Subjective:    Patient is a 89y old  Female who presents with a chief complaint of s/p trip and fall w/ inferior and superior pubic rami fracture. Overnight the patient had no acute events. This morning, patient states she feels well. Denies fever, chills, SOB, CP, abdominal pain, N/V diarrhea or constipation.      Past Medical Hx:   Hyperlipidemia  Hypertension  Constipation  TIA (transient ischemic attack)  Urinary retention  No pertinent past medical history    Past Sx:  No significant past surgical history    Allergies:  No Known Allergies    Current Meds:   Standng Meds:  acetaminophen   Tablet .. 650 milliGRAM(s) Oral every 6 hours  aspirin  chewable 81 milliGRAM(s) Oral daily  atorvastatin 20 milliGRAM(s) Oral at bedtime  cefTRIAXone   IVPB 1000 milliGRAM(s) IV Intermittent every 24 hours  chlorhexidine 4% Liquid 1 Application(s) Topical <User Schedule>  dextrose 5%. 1000 milliLiter(s) (50 mL/Hr) IV Continuous <Continuous>  donepezil 10 milliGRAM(s) Oral at bedtime  haloperidol    Concentrate 1 milliGRAM(s) Oral at bedtime  losartan 25 milliGRAM(s) Oral daily  melatonin 5 milliGRAM(s) Oral at bedtime  memantine 10 milliGRAM(s) Oral two times a day  pantoprazole    Tablet 40 milliGRAM(s) Oral before breakfast  tamsulosin 0.4 milliGRAM(s) Oral at bedtime    PRN Meds:  haloperidol    Concentrate 1 milliGRAM(s) Oral every 6 hours PRN agitation    HOME MEDICATIONS:  aspirin 81 mg oral tablet: 1 tab(s) orally once a day  ciprofloxacin 250 mg oral tablet: 1 tab(s) orally 2 times a day please stop on 7/2/2019  donepezil 10 mg oral tablet: 1 tab(s) orally once a day (at bedtime)  irbesartan-hydrochlorothiazide 150mg-12.5mg oral tablet: 1 tab(s) orally once a day  LORazepam 0.5 mg oral tablet: 1 tab(s) orally 3 times a day, As Needed  lovastatin 20 mg oral tablet: 1 tab(s) orally once a day  memantine 10 mg oral tablet: 1 tab(s) orally 2 times a day  traZODone 50 mg oral tablet: 0.5 tab(s) orally once a day, As Needed      Vital Signs:   T(F): 96.9 (07-03-19 @ 07:29), Max: 97.9 (07-02-19 @ 09:51)  HR: 77 (07-03-19 @ 07:29) (75 - 106)  BP: 144/63 (07-03-19 @ 07:29) (117/55 - 144/63)  RR: 18 (07-03-19 @ 07:29) (18 - 19)  SpO2: 100% (07-02-19 @ 10:39) (100% - 100%)      07-02-19 @ 07:01  -  07-03-19 @ 07:00  --------------------------------------------------------  IN: 980 mL / OUT: 1600 mL / NET: -620 mL        Physical Exam:   GENERAL: NAD  HEENT: NCAT  CHEST/LUNG: CTA bl  HEART: Regular rate and rhythm; s1 s2 appreciated, No murmurs, rubs, or gallops  ABDOMEN: Soft, Nontender, Nondistended; Bowel sounds present  EXTREMITIES: No LE edema b/l  NERVOUS SYSTEM:  Alert & Oriented X3        Labs:                         9.0    12.29 )-----------( 264      ( 03 Jul 2019 05:56 )             27.6     Neutophil% 74.8, Lymphocyte% 14.2, Monocyte% 7.9, Bands% 1.5 07-03-19 @ 05:56    03 Jul 2019 05:56    136    |  105    |  35     ----------------------------<  115    4.3     |  20     |  0.9      Ca    8.2        03 Jul 2019 05:56    TPro  5.1    /  Alb  2.7    /  TBili  0.7    /  DBili  x      /  AST  29     /  ALT  32     /  AlkPhos  162    03 Jul 2019 05:56      Radiology:

## 2019-07-03 NOTE — PROGRESS NOTE ADULT - REASON FOR ADMISSION
s/p trip and fall

## 2019-07-03 NOTE — PROGRESS NOTE ADULT - PROVIDER SPECIALTY LIST ADULT
Hospitalist
Internal Medicine
Palliative Care
Surgery
Trauma Surgery
Trauma Surgery
Urology
Internal Medicine
Hospitalist
Hospitalist

## 2019-07-03 NOTE — PROGRESS NOTE ADULT - ASSESSMENT
Assessment and Plan:     89yo F with PMH of HTN, Hypercholesteremia and Dementia s/p mechanical fall w/ acute fractures of the left superior and inferior pubic ramus, as well as left distal radius fracture. Hospital course complicated by one episode unresponsiveness resolved spontaneously, JAIDA on CKD 2/2 to obstruction s/p nuñez w/ Cr improvement, UTI and hematuria, which is improving, acute blood loss anemia s/p 2 units pRBC. Patient remains in stable condition    #) acute blood loss anemia, improved  - Hg this morning  - s/p 1u of pRBCs for Hgb of 6.7. on 6/29  - AM Hgb was 6.7. Will transfuse 1 u pRBC  - hematuria is persistent, with mild improvement  - Will f/u w/ urology  - will f/u Hgb with AM labs    #) UTI with JAIDA on CKD, stable  - Urine cx + for E Coli & Morganella Morganii  - will switch ABx to Rocephin IV given culture sensitivities   - c/w nuñez  - bladder U/S shows b/l renal cysts, no hydroneprhosis    #)S/p fall and acute fracture of the left superior and inferior pubic rami, Lt distal radius fracture s/p splint for wrist, stable  - currently stable  - OOB chair with nursing  - c/w Pain control (acetaminophen 650mg PO Q6)  - f/u with ortho as OP    #)Episode of unresponsiveness -seizure ruled out, resolved  -CT scan of head negative  -EEG negative for seizure  -MRI shows significant volume loss    #)HTN, stable-c/w losartan     #)Dementia, stable-c/w donepezil and memantine    #)DLD, stable-c/w lipitor      GI/ ppx: hold Lovenox due to persistent hematuria  Code Status: DNR/DNI  DISPO: pending SNF placement Assessment and Plan:     89yo F with PMH of HTN, Hypercholesteremia and Dementia s/p mechanical fall w/ acute fractures of the left superior and inferior pubic ramus, as well as left distal radius fracture. Hospital course complicated by one episode unresponsiveness resolved spontaneously, JAIDA on CKD 2/2 to obstruction s/p nuñez w/ Cr improvement, UTI and hematuria, which is improving, acute blood loss anemia s/p 2 units pRBC. Patient remains in stable condition    #) acute blood loss anemia, improved  - Hg this morning is 9 s/p 1 unit on 7/2  - also received 1u of pRBCs for Hgb of 6.7 on 6/29  - hematuria has resolved  - will continue to follow Hgb with AM labs    #) UTI with JAIDA on CKD, improved  - Urine cx + for E Coli & Morganella Morganii  - c/w Rocephin IV  - uro recs appreciated, will c/w q8 intermittent cath  - bladder U/S shows b/l renal cysts, no hydroneprhosis    #)S/p fall and acute fracture of the left superior and inferior pubic rami, Lt distal radius fracture s/p splint for wrist, stable  - currently stable  - OOB chair with nursing  - c/w Pain control (acetaminophen 650mg PO Q6)  - f/u with ortho as OP    #)Episode of unresponsiveness -seizure ruled out, resolved  -CT scan of head negative  -EEG negative for seizure  -MRI shows significant volume loss    #)HTN, stable-c/w losartan     #)Dementia, stable-c/w donepezil and memantine    #)DLD, stable-c/w lipitor    Code Status: DNR/DNI  DISPO: pending SNF placement Assessment and Plan:     89yo F with PMH of HTN, Hypercholesteremia and Dementia s/p mechanical fall w/ acute fractures of the left superior and inferior pubic ramus, as well as left distal radius fracture. Hospital course complicated by one episode unresponsiveness resolved spontaneously, JAIDA on CKD 2/2 to obstruction s/p nuñez w/ Cr improvement, UTI and hematuria, which is improving, acute blood loss anemia s/p 2 units pRBC. Patient remains in stable condition    #) acute blood loss anemia, improved  - Hg this morning is 9 s/p 1 unit on 7/2  - also received 1u of pRBCs for Hgb of 6.7 on 6/29  - hematuria has resolved  - will continue to follow Hgb with AM labs    #) UTI with JAIDA on CKD, improved  - Urine cx + for E Coli & Morganella Morganii  - c/w Rocephin IV  - uro recs appreciated, will c/w q8 intermittent cath  - bladder U/S shows b/l renal cysts, no hydroneprhosis    #)S/p fall and acute fracture of the left superior and inferior pubic rami, Lt distal radius fracture s/p splint for wrist, stable  - currently stable  - OOB chair with nursing  - c/w Pain control (acetaminophen 650mg PO Q6)  - f/u with ortho as OP    #)Episode of unresponsiveness -seizure ruled out, resolved  -CT scan of head negative  -EEG negative for seizure  -MRI shows significant volume loss    #)HTN, stable-c/w losartan     #)Dementia, stable-c/w donepezil and memantine    #)DLD, stable-c/w lipitor    Code Status: DNR/DNI  DISPO: will discharge to SNF today

## 2019-07-03 NOTE — PROGRESS NOTE ADULT - ASSESSMENT
89 y.o F with UTI, Urinary retention, hematuria- impoved  pelvic fx   left radius fx       Plan:  Cont. IVF  Cont. Abx,   Started on CIC please F/U with Bladder Scan Q4-6 Hr, CIC if PVR >350-400 cc.   Cont. Flomax   If still in retention, OK for Raman catheter as outpatient for short term, but if long term urinary retention might benefit from SPT.   will F/U

## 2019-07-03 NOTE — PROGRESS NOTE ADULT - SUBJECTIVE AND OBJECTIVE BOX
GARETH CUEVAS [id number]    Hospitalist Note  89yo F with Past Medical History HTN, Hypercholestremia, and Dementia admitted status post fall complicated by acute fractures of the left superior and inferior pubic ramus, as well as left distal radius fracture.  Status post splinting by orthopedic surgery.  Her clinical course was complicated by an episode of unresponsiveness on 6/23/19 rule out seizure.    Overnight events/Updates: Hematuria has resolved following treatment with antibiotics.  Hemoglobin improved to 9 following transfusion.  Urology recommended discontinuation of nuñez catheter with intermittent straight catheterization.    Vital Signs Last 24 Hrs  T(C): 36.1 (03 Jul 2019 07:29), Max: 36.1 (03 Jul 2019 07:29)  T(F): 96.9 (03 Jul 2019 07:29), Max: 96.9 (03 Jul 2019 07:29)  HR: 77 (03 Jul 2019 07:29) (77 - 106)  BP: 144/63 (03 Jul 2019 07:29) (117/55 - 144/63)  BP(mean): --  RR: 18 (03 Jul 2019 07:29) (18 - 18)  SpO2: --    Physical Examination:  General: AAO x 1  HEENT: PERRLA, EOMI  CV= S1 & S2 appreciated   Lungs= CTA BL  Abdominal Examination= + BS, Soft, NT/ND  Extremity Examination= Splint to the LUE    ROS: No chest pain, no shortness of breath.  All other systems reviewed and are within normal limits except for the complaints in the HPI.    MEDICATIONS  (STANDING):  acetaminophen   Tablet .. 650 milliGRAM(s) Oral every 6 hours  aspirin  chewable 81 milliGRAM(s) Oral daily  atorvastatin 20 milliGRAM(s) Oral at bedtime  cefpodoxime 100 milliGRAM(s) Oral every 12 hours  chlorhexidine 4% Liquid 1 Application(s) Topical <User Schedule>  dextrose 5%. 1000 milliLiter(s) (50 mL/Hr) IV Continuous <Continuous>  donepezil 10 milliGRAM(s) Oral at bedtime  haloperidol    Concentrate 1 milliGRAM(s) Oral at bedtime  losartan 25 milliGRAM(s) Oral daily  melatonin 5 milliGRAM(s) Oral at bedtime  memantine 10 milliGRAM(s) Oral two times a day  pantoprazole    Tablet 40 milliGRAM(s) Oral before breakfast  tamsulosin 0.4 milliGRAM(s) Oral at bedtime    MEDICATIONS  (PRN):  haloperidol    Concentrate 1 milliGRAM(s) Oral every 6 hours PRN agitation                            9.0    12.29 )-----------( 264      ( 03 Jul 2019 05:56 )             27.6     07-03    136  |  105  |  35<H>  ----------------------------<  115<H>  4.3   |  20  |  0.9    Ca    8.2<L>      03 Jul 2019 05:56    TPro  5.1<L>  /  Alb  2.7<L>  /  TBili  0.7  /  DBili  x   /  AST  29  /  ALT  32  /  AlkPhos  162<H>  07-03      Case discussed with housestaff & family  TAWANDA Del Cid [id number]

## 2019-07-03 NOTE — CHART NOTE - NSCHARTNOTEFT_GEN_A_CORE
Registered Dietitian Limited Follow Up    Hematuria has resolved following treatment with antibiotics. Medically stable for discharge to SNF. Meds and labs reviewed; no GI distress noted, LBM 7/3. BS 16. Pt's wt appears to be stable and no edema noted. Spoke with pt's daughter, who reports that pt remains with good appetite/po intake and is consuming 50-75% of her meals + Ensure Enlive BID. All nutrition interventions currently in place, no further interventions. RD to reassess pt again in 7 days.

## 2019-07-03 NOTE — PROGRESS NOTE ADULT - ASSESSMENT
89yo F with Past Medical History HTN, Hypercholestremia, and Dementia admitted status post fall complicated by acute fractures of the left superior and inferior pubic ramus, as well as left distal radius fracture.     Fall complicated by superior/inferior pubic ramus fracture & left distal radius fracture: status post splinting to the left upper extremity.  No new complaints.  Continue Tylenol PRN for pain.  OOB to chair with nursing.  Acute cystitis secondary to nuñez catheter: likely related to nuñez.  Switch IV Rocephin 1gm q24 to Vantin 100mg PO q12h.  Acute blood loss anemia: hemoglobin improved to 9 following transfusion.   recommendations were reviewed; status post nuñez catheter removal.   The patient's daughter agreed to intermittent straight catheterization.  AMS rule out seizures: mental status has returned to baseline; pt alert, but speaks on a minimal basis.  EEG was negative for seizures.  MRI revealed significant volume loss.     Hypertension: continue Losartan 25mg PO q24  Hypercholesteremia: continue Lipitor 20mg QHS  Dementia: continue Memantine and Donepezil as directed  GI/DVT prophylaxis: off Lovenox due to hematuria    #Progress Note Handoff    Pending:  Tests: monitor CBC    Future Disposition: Medically stable for discharge to SNF

## 2019-07-09 ENCOUNTER — OUTPATIENT (OUTPATIENT)
Dept: OUTPATIENT SERVICES | Facility: HOSPITAL | Age: 84
LOS: 1 days | Discharge: HOME | End: 2019-07-09

## 2019-07-09 DIAGNOSIS — R79.9 ABNORMAL FINDING OF BLOOD CHEMISTRY, UNSPECIFIED: ICD-10-CM

## 2019-07-10 DIAGNOSIS — I12.9 HYPERTENSIVE CHRONIC KIDNEY DISEASE WITH STAGE 1 THROUGH STAGE 4 CHRONIC KIDNEY DISEASE, OR UNSPECIFIED CHRONIC KIDNEY DISEASE: ICD-10-CM

## 2019-07-10 DIAGNOSIS — K59.00 CONSTIPATION, UNSPECIFIED: ICD-10-CM

## 2019-07-10 DIAGNOSIS — S52.92XA UNSPECIFIED FRACTURE OF LEFT FOREARM, INITIAL ENCOUNTER FOR CLOSED FRACTURE: ICD-10-CM

## 2019-07-10 DIAGNOSIS — S52.122A DISPLACED FRACTURE OF HEAD OF LEFT RADIUS, INITIAL ENCOUNTER FOR CLOSED FRACTURE: ICD-10-CM

## 2019-07-10 DIAGNOSIS — S60.212A CONTUSION OF LEFT WRIST, INITIAL ENCOUNTER: ICD-10-CM

## 2019-07-10 DIAGNOSIS — T83.511A INFECTION AND INFLAMMATORY REACTION DUE TO INDWELLING URETHRAL CATHETER, INITIAL ENCOUNTER: ICD-10-CM

## 2019-07-10 DIAGNOSIS — E78.5 HYPERLIPIDEMIA, UNSPECIFIED: ICD-10-CM

## 2019-07-10 DIAGNOSIS — Z86.73 PERSONAL HISTORY OF TRANSIENT ISCHEMIC ATTACK (TIA), AND CEREBRAL INFARCTION WITHOUT RESIDUAL DEFICITS: ICD-10-CM

## 2019-07-10 DIAGNOSIS — N30.01 ACUTE CYSTITIS WITH HEMATURIA: ICD-10-CM

## 2019-07-10 DIAGNOSIS — S32.512A FRACTURE OF SUPERIOR RIM OF LEFT PUBIS, INITIAL ENCOUNTER FOR CLOSED FRACTURE: ICD-10-CM

## 2019-07-10 DIAGNOSIS — Z79.82 LONG TERM (CURRENT) USE OF ASPIRIN: ICD-10-CM

## 2019-07-10 DIAGNOSIS — R31.9 HEMATURIA, UNSPECIFIED: ICD-10-CM

## 2019-07-10 DIAGNOSIS — S52.602A UNSPECIFIED FRACTURE OF LOWER END OF LEFT ULNA, INITIAL ENCOUNTER FOR CLOSED FRACTURE: ICD-10-CM

## 2019-07-10 DIAGNOSIS — E87.6 HYPOKALEMIA: ICD-10-CM

## 2019-07-10 DIAGNOSIS — R33.8 OTHER RETENTION OF URINE: ICD-10-CM

## 2019-07-10 DIAGNOSIS — Z66 DO NOT RESUSCITATE: ICD-10-CM

## 2019-07-10 DIAGNOSIS — B96.4 PROTEUS (MIRABILIS) (MORGANII) AS THE CAUSE OF DISEASES CLASSIFIED ELSEWHERE: ICD-10-CM

## 2019-07-10 DIAGNOSIS — R41.82 ALTERED MENTAL STATUS, UNSPECIFIED: ICD-10-CM

## 2019-07-10 DIAGNOSIS — F03.90 UNSPECIFIED DEMENTIA WITHOUT BEHAVIORAL DISTURBANCE: ICD-10-CM

## 2019-07-10 DIAGNOSIS — N17.9 ACUTE KIDNEY FAILURE, UNSPECIFIED: ICD-10-CM

## 2019-07-10 DIAGNOSIS — B96.20 UNSPECIFIED ESCHERICHIA COLI [E. COLI] AS THE CAUSE OF DISEASES CLASSIFIED ELSEWHERE: ICD-10-CM

## 2019-07-10 DIAGNOSIS — N18.9 CHRONIC KIDNEY DISEASE, UNSPECIFIED: ICD-10-CM

## 2019-07-10 DIAGNOSIS — D62 ACUTE POSTHEMORRHAGIC ANEMIA: ICD-10-CM
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
## 2019-07-11 ENCOUNTER — OUTPATIENT (OUTPATIENT)
Dept: OUTPATIENT SERVICES | Facility: HOSPITAL | Age: 84
LOS: 1 days | Discharge: HOME | End: 2019-07-11

## 2019-07-11 DIAGNOSIS — N39.0 URINARY TRACT INFECTION, SITE NOT SPECIFIED: ICD-10-CM

## 2019-07-12 ENCOUNTER — OUTPATIENT (OUTPATIENT)
Dept: OUTPATIENT SERVICES | Facility: HOSPITAL | Age: 84
LOS: 1 days | Discharge: HOME | End: 2019-07-12

## 2019-07-13 DIAGNOSIS — N39.9 DISORDER OF URINARY SYSTEM, UNSPECIFIED: ICD-10-CM

## 2019-07-15 ENCOUNTER — OUTPATIENT (OUTPATIENT)
Dept: OUTPATIENT SERVICES | Facility: HOSPITAL | Age: 84
LOS: 1 days | Discharge: HOME | End: 2019-07-15

## 2019-07-15 ENCOUNTER — INPATIENT (INPATIENT)
Facility: HOSPITAL | Age: 84
LOS: 5 days | Discharge: HOSPICE | End: 2019-07-21
Attending: HOSPITALIST | Admitting: HOSPITALIST
Payer: MEDICARE

## 2019-07-15 VITALS
TEMPERATURE: 99 F | RESPIRATION RATE: 18 BRPM | SYSTOLIC BLOOD PRESSURE: 109 MMHG | OXYGEN SATURATION: 100 % | DIASTOLIC BLOOD PRESSURE: 51 MMHG | HEART RATE: 82 BPM

## 2019-07-15 DIAGNOSIS — R94.5 ABNORMAL RESULTS OF LIVER FUNCTION STUDIES: ICD-10-CM

## 2019-07-15 DIAGNOSIS — A09 INFECTIOUS GASTROENTERITIS AND COLITIS, UNSPECIFIED: ICD-10-CM

## 2019-07-15 LAB
ALBUMIN SERPL ELPH-MCNC: 2.5 G/DL — LOW (ref 3.5–5.2)
ALP SERPL-CCNC: 125 U/L — HIGH (ref 30–115)
ALT FLD-CCNC: 20 U/L — SIGNIFICANT CHANGE UP (ref 0–41)
ANION GAP SERPL CALC-SCNC: 10 MMOL/L — SIGNIFICANT CHANGE UP (ref 7–14)
APPEARANCE UR: CLEAR — SIGNIFICANT CHANGE UP
APTT BLD: 26.3 SEC — LOW (ref 27–39.2)
AST SERPL-CCNC: 17 U/L — SIGNIFICANT CHANGE UP (ref 0–41)
BASOPHILS # BLD AUTO: 0.03 K/UL — SIGNIFICANT CHANGE UP (ref 0–0.2)
BASOPHILS NFR BLD AUTO: 0.2 % — SIGNIFICANT CHANGE UP (ref 0–1)
BILIRUB SERPL-MCNC: 0.3 MG/DL — SIGNIFICANT CHANGE UP (ref 0.2–1.2)
BILIRUB UR-MCNC: NEGATIVE — SIGNIFICANT CHANGE UP
BLD GP AB SCN SERPL QL: SIGNIFICANT CHANGE UP
BUN SERPL-MCNC: 41 MG/DL — HIGH (ref 10–20)
BURR CELLS BLD QL SMEAR: SIGNIFICANT CHANGE UP
CALCIUM SERPL-MCNC: 7.7 MG/DL — LOW (ref 8.5–10.1)
CHLORIDE SERPL-SCNC: 104 MMOL/L — SIGNIFICANT CHANGE UP (ref 98–110)
CO2 SERPL-SCNC: 21 MMOL/L — SIGNIFICANT CHANGE UP (ref 17–32)
COLOR SPEC: SIGNIFICANT CHANGE UP
CREAT SERPL-MCNC: 0.8 MG/DL — SIGNIFICANT CHANGE UP (ref 0.7–1.5)
DIFF PNL FLD: NEGATIVE — SIGNIFICANT CHANGE UP
EOSINOPHIL # BLD AUTO: 0.25 K/UL — SIGNIFICANT CHANGE UP (ref 0–0.7)
EOSINOPHIL NFR BLD AUTO: 2 % — SIGNIFICANT CHANGE UP (ref 0–8)
GIANT PLATELETS BLD QL SMEAR: PRESENT — SIGNIFICANT CHANGE UP
GLUCOSE SERPL-MCNC: 104 MG/DL — HIGH (ref 70–99)
GLUCOSE UR QL: NEGATIVE — SIGNIFICANT CHANGE UP
HCT VFR BLD CALC: 21.9 % — LOW (ref 37–47)
HGB BLD-MCNC: 6.8 G/DL — CRITICAL LOW (ref 12–16)
HYPOCHROMIA BLD QL: SLIGHT — SIGNIFICANT CHANGE UP
IMM GRANULOCYTES NFR BLD AUTO: 2.4 % — HIGH (ref 0.1–0.3)
INR BLD: 1.07 RATIO — SIGNIFICANT CHANGE UP (ref 0.65–1.3)
KETONES UR-MCNC: NEGATIVE — SIGNIFICANT CHANGE UP
LACTATE SERPL-SCNC: 1.9 MMOL/L — SIGNIFICANT CHANGE UP (ref 0.5–2.2)
LEUKOCYTE ESTERASE UR-ACNC: ABNORMAL
LIDOCAIN IGE QN: 42 U/L — SIGNIFICANT CHANGE UP (ref 7–60)
LYMPHOCYTES # BLD AUTO: 2.74 K/UL — SIGNIFICANT CHANGE UP (ref 1.2–3.4)
LYMPHOCYTES # BLD AUTO: 21.9 % — SIGNIFICANT CHANGE UP (ref 20.5–51.1)
MANUAL SMEAR VERIFICATION: SIGNIFICANT CHANGE UP
MCHC RBC-ENTMCNC: 30.6 PG — SIGNIFICANT CHANGE UP (ref 27–31)
MCHC RBC-ENTMCNC: 31.1 G/DL — LOW (ref 32–37)
MCV RBC AUTO: 98.6 FL — SIGNIFICANT CHANGE UP (ref 81–99)
MONOCYTES # BLD AUTO: 0.99 K/UL — HIGH (ref 0.1–0.6)
MONOCYTES NFR BLD AUTO: 7.9 % — SIGNIFICANT CHANGE UP (ref 1.7–9.3)
NEUTROPHILS # BLD AUTO: 8.18 K/UL — HIGH (ref 1.4–6.5)
NEUTROPHILS NFR BLD AUTO: 65.6 % — SIGNIFICANT CHANGE UP (ref 42.2–75.2)
NITRITE UR-MCNC: NEGATIVE — SIGNIFICANT CHANGE UP
NRBC # BLD: 0 /100 WBCS — SIGNIFICANT CHANGE UP (ref 0–0)
OVALOCYTES BLD QL SMEAR: SLIGHT — SIGNIFICANT CHANGE UP
PH UR: 6 — SIGNIFICANT CHANGE UP (ref 5–8)
PLAT MORPH BLD: NORMAL — SIGNIFICANT CHANGE UP
PLATELET # BLD AUTO: 206 K/UL — SIGNIFICANT CHANGE UP (ref 130–400)
POTASSIUM SERPL-MCNC: 4.6 MMOL/L — SIGNIFICANT CHANGE UP (ref 3.5–5)
POTASSIUM SERPL-SCNC: 4.6 MMOL/L — SIGNIFICANT CHANGE UP (ref 3.5–5)
PROT SERPL-MCNC: 4.8 G/DL — LOW (ref 6–8)
PROT UR-MCNC: SIGNIFICANT CHANGE UP
PROTHROM AB SERPL-ACNC: 12.3 SEC — SIGNIFICANT CHANGE UP (ref 9.95–12.87)
RBC # BLD: 2.22 M/UL — LOW (ref 4.2–5.4)
RBC # FLD: 16.8 % — HIGH (ref 11.5–14.5)
RBC BLD AUTO: NORMAL — SIGNIFICANT CHANGE UP
SODIUM SERPL-SCNC: 135 MMOL/L — SIGNIFICANT CHANGE UP (ref 135–146)
SP GR SPEC: 1.02 — SIGNIFICANT CHANGE UP (ref 1.01–1.02)
TROPONIN T SERPL-MCNC: 0.02 NG/ML — HIGH
UROBILINOGEN FLD QL: SIGNIFICANT CHANGE UP
VARIANT LYMPHS # BLD: 3.5 % — SIGNIFICANT CHANGE UP (ref 0–5)
WBC # BLD: 12.49 K/UL — HIGH (ref 4.8–10.8)
WBC # FLD AUTO: 12.49 K/UL — HIGH (ref 4.8–10.8)

## 2019-07-15 PROCEDURE — 93010 ELECTROCARDIOGRAM REPORT: CPT

## 2019-07-15 PROCEDURE — 99285 EMERGENCY DEPT VISIT HI MDM: CPT

## 2019-07-15 PROCEDURE — 71045 X-RAY EXAM CHEST 1 VIEW: CPT | Mod: 26

## 2019-07-15 PROCEDURE — 93971 EXTREMITY STUDY: CPT | Mod: 26,LT

## 2019-07-15 RX ORDER — SODIUM CHLORIDE 9 MG/ML
500 INJECTION INTRAMUSCULAR; INTRAVENOUS; SUBCUTANEOUS ONCE
Refills: 0 | Status: COMPLETED | OUTPATIENT
Start: 2019-07-15 | End: 2019-07-15

## 2019-07-15 RX ORDER — PANTOPRAZOLE SODIUM 20 MG/1
40 TABLET, DELAYED RELEASE ORAL ONCE
Refills: 0 | Status: COMPLETED | OUTPATIENT
Start: 2019-07-15 | End: 2019-07-15

## 2019-07-15 RX ADMIN — PANTOPRAZOLE SODIUM 40 MILLIGRAM(S): 20 TABLET, DELAYED RELEASE ORAL at 21:24

## 2019-07-15 RX ADMIN — SODIUM CHLORIDE 1000 MILLILITER(S): 9 INJECTION INTRAMUSCULAR; INTRAVENOUS; SUBCUTANEOUS at 19:27

## 2019-07-15 NOTE — ED ADULT TRIAGE NOTE - CHIEF COMPLAINT QUOTE
Pt sent to ED from [facility] for Low Hgb of 6.1 and bloody stools. Pt c/o of abdominal pain.

## 2019-07-15 NOTE — ED ADULT NURSE NOTE - CHIEF COMPLAINT QUOTE
Pt sent to ED from [facility] for Low Hgb of 6.1 and bloody stools. Pt c/o of abdominal pain.

## 2019-07-16 LAB
BACTERIA # UR AUTO: NEGATIVE — SIGNIFICANT CHANGE UP
BASOPHILS # BLD AUTO: 0.04 K/UL — SIGNIFICANT CHANGE UP (ref 0–0.2)
BASOPHILS # BLD AUTO: 0.06 K/UL — SIGNIFICANT CHANGE UP (ref 0–0.2)
BASOPHILS NFR BLD AUTO: 0.3 % — SIGNIFICANT CHANGE UP (ref 0–1)
BASOPHILS NFR BLD AUTO: 0.4 % — SIGNIFICANT CHANGE UP (ref 0–1)
EOSINOPHIL # BLD AUTO: 0.2 K/UL — SIGNIFICANT CHANGE UP (ref 0–0.7)
EOSINOPHIL # BLD AUTO: 0.23 K/UL — SIGNIFICANT CHANGE UP (ref 0–0.7)
EOSINOPHIL NFR BLD AUTO: 1.5 % — SIGNIFICANT CHANGE UP (ref 0–8)
EOSINOPHIL NFR BLD AUTO: 1.6 % — SIGNIFICANT CHANGE UP (ref 0–8)
EPI CELLS # UR: 9 /HPF — HIGH (ref 0–5)
HCT VFR BLD CALC: 23.4 % — LOW (ref 37–47)
HCT VFR BLD CALC: 24 % — LOW (ref 37–47)
HGB BLD-MCNC: 7.5 G/DL — LOW (ref 12–16)
HGB BLD-MCNC: 7.9 G/DL — LOW (ref 12–16)
HYALINE CASTS # UR AUTO: 2 /LPF — SIGNIFICANT CHANGE UP (ref 0–7)
IMM GRANULOCYTES NFR BLD AUTO: 2.3 % — HIGH (ref 0.1–0.3)
IMM GRANULOCYTES NFR BLD AUTO: 2.9 % — HIGH (ref 0.1–0.3)
LYMPHOCYTES # BLD AUTO: 18.1 % — LOW (ref 20.5–51.1)
LYMPHOCYTES # BLD AUTO: 19.6 % — LOW (ref 20.5–51.1)
LYMPHOCYTES # BLD AUTO: 2.47 K/UL — SIGNIFICANT CHANGE UP (ref 1.2–3.4)
LYMPHOCYTES # BLD AUTO: 2.86 K/UL — SIGNIFICANT CHANGE UP (ref 1.2–3.4)
MCHC RBC-ENTMCNC: 30.1 PG — SIGNIFICANT CHANGE UP (ref 27–31)
MCHC RBC-ENTMCNC: 31 PG — SIGNIFICANT CHANGE UP (ref 27–31)
MCHC RBC-ENTMCNC: 32.1 G/DL — SIGNIFICANT CHANGE UP (ref 32–37)
MCHC RBC-ENTMCNC: 32.9 G/DL — SIGNIFICANT CHANGE UP (ref 32–37)
MCV RBC AUTO: 94 FL — SIGNIFICANT CHANGE UP (ref 81–99)
MCV RBC AUTO: 94.1 FL — SIGNIFICANT CHANGE UP (ref 81–99)
MONOCYTES # BLD AUTO: 1.11 K/UL — HIGH (ref 0.1–0.6)
MONOCYTES # BLD AUTO: 1.16 K/UL — HIGH (ref 0.1–0.6)
MONOCYTES NFR BLD AUTO: 8 % — SIGNIFICANT CHANGE UP (ref 1.7–9.3)
MONOCYTES NFR BLD AUTO: 8.1 % — SIGNIFICANT CHANGE UP (ref 1.7–9.3)
NEUTROPHILS # BLD AUTO: 9.42 K/UL — HIGH (ref 1.4–6.5)
NEUTROPHILS # BLD AUTO: 9.97 K/UL — HIGH (ref 1.4–6.5)
NEUTROPHILS NFR BLD AUTO: 68.2 % — SIGNIFICANT CHANGE UP (ref 42.2–75.2)
NEUTROPHILS NFR BLD AUTO: 69 % — SIGNIFICANT CHANGE UP (ref 42.2–75.2)
NRBC # BLD: 0 /100 WBCS — SIGNIFICANT CHANGE UP (ref 0–0)
NRBC # BLD: 0 /100 WBCS — SIGNIFICANT CHANGE UP (ref 0–0)
PLATELET # BLD AUTO: 164 K/UL — SIGNIFICANT CHANGE UP (ref 130–400)
PLATELET # BLD AUTO: 168 K/UL — SIGNIFICANT CHANGE UP (ref 130–400)
RBC # BLD: 2.49 M/UL — LOW (ref 4.2–5.4)
RBC # BLD: 2.55 M/UL — LOW (ref 4.2–5.4)
RBC # FLD: 17.8 % — HIGH (ref 11.5–14.5)
RBC # FLD: 18.6 % — HIGH (ref 11.5–14.5)
RBC CASTS # UR COMP ASSIST: 4 /HPF — SIGNIFICANT CHANGE UP (ref 0–4)
WBC # BLD: 13.65 K/UL — HIGH (ref 4.8–10.8)
WBC # BLD: 14.59 K/UL — HIGH (ref 4.8–10.8)
WBC # FLD AUTO: 13.65 K/UL — HIGH (ref 4.8–10.8)
WBC # FLD AUTO: 14.59 K/UL — HIGH (ref 4.8–10.8)
WBC UR QL: 12 /HPF — HIGH (ref 0–5)

## 2019-07-16 PROCEDURE — 99223 1ST HOSP IP/OBS HIGH 75: CPT | Mod: AI

## 2019-07-16 PROCEDURE — 74174 CTA ABD&PLVS W/CONTRAST: CPT | Mod: 26

## 2019-07-16 RX ORDER — ATORVASTATIN CALCIUM 80 MG/1
20 TABLET, FILM COATED ORAL AT BEDTIME
Refills: 0 | Status: DISCONTINUED | OUTPATIENT
Start: 2019-07-16 | End: 2019-07-18

## 2019-07-16 RX ORDER — DOCUSATE SODIUM 100 MG
100 CAPSULE ORAL THREE TIMES A DAY
Refills: 0 | Status: DISCONTINUED | OUTPATIENT
Start: 2019-07-16 | End: 2019-07-17

## 2019-07-16 RX ORDER — SODIUM CHLORIDE 9 MG/ML
1000 INJECTION INTRAMUSCULAR; INTRAVENOUS; SUBCUTANEOUS
Refills: 0 | Status: DISCONTINUED | OUTPATIENT
Start: 2019-07-16 | End: 2019-07-17

## 2019-07-16 RX ORDER — HALOPERIDOL DECANOATE 100 MG/ML
1 INJECTION INTRAMUSCULAR DAILY
Refills: 0 | Status: DISCONTINUED | OUTPATIENT
Start: 2019-07-16 | End: 2019-07-17

## 2019-07-16 RX ORDER — TAMSULOSIN HYDROCHLORIDE 0.4 MG/1
0.4 CAPSULE ORAL AT BEDTIME
Refills: 0 | Status: DISCONTINUED | OUTPATIENT
Start: 2019-07-16 | End: 2019-07-21

## 2019-07-16 RX ORDER — LOSARTAN POTASSIUM 100 MG/1
25 TABLET, FILM COATED ORAL DAILY
Refills: 0 | Status: DISCONTINUED | OUTPATIENT
Start: 2019-07-16 | End: 2019-07-21

## 2019-07-16 RX ORDER — DONEPEZIL HYDROCHLORIDE 10 MG/1
10 TABLET, FILM COATED ORAL AT BEDTIME
Refills: 0 | Status: DISCONTINUED | OUTPATIENT
Start: 2019-07-16 | End: 2019-07-18

## 2019-07-16 RX ORDER — PANTOPRAZOLE SODIUM 20 MG/1
40 TABLET, DELAYED RELEASE ORAL EVERY 12 HOURS
Refills: 0 | Status: DISCONTINUED | OUTPATIENT
Start: 2019-07-16 | End: 2019-07-18

## 2019-07-16 RX ORDER — MEMANTINE HYDROCHLORIDE 10 MG/1
10 TABLET ORAL
Refills: 0 | Status: DISCONTINUED | OUTPATIENT
Start: 2019-07-16 | End: 2019-07-18

## 2019-07-16 RX ADMIN — ATORVASTATIN CALCIUM 20 MILLIGRAM(S): 80 TABLET, FILM COATED ORAL at 21:47

## 2019-07-16 RX ADMIN — MEMANTINE HYDROCHLORIDE 10 MILLIGRAM(S): 10 TABLET ORAL at 05:49

## 2019-07-16 RX ADMIN — PANTOPRAZOLE SODIUM 40 MILLIGRAM(S): 20 TABLET, DELAYED RELEASE ORAL at 18:32

## 2019-07-16 RX ADMIN — MEMANTINE HYDROCHLORIDE 10 MILLIGRAM(S): 10 TABLET ORAL at 18:32

## 2019-07-16 RX ADMIN — LOSARTAN POTASSIUM 25 MILLIGRAM(S): 100 TABLET, FILM COATED ORAL at 05:49

## 2019-07-16 RX ADMIN — PANTOPRAZOLE SODIUM 40 MILLIGRAM(S): 20 TABLET, DELAYED RELEASE ORAL at 06:01

## 2019-07-16 RX ADMIN — SODIUM CHLORIDE 75 MILLILITER(S): 9 INJECTION INTRAMUSCULAR; INTRAVENOUS; SUBCUTANEOUS at 06:01

## 2019-07-16 RX ADMIN — TAMSULOSIN HYDROCHLORIDE 0.4 MILLIGRAM(S): 0.4 CAPSULE ORAL at 21:48

## 2019-07-16 RX ADMIN — DONEPEZIL HYDROCHLORIDE 10 MILLIGRAM(S): 10 TABLET, FILM COATED ORAL at 21:48

## 2019-07-16 RX ADMIN — SODIUM CHLORIDE 75 MILLILITER(S): 9 INJECTION INTRAMUSCULAR; INTRAVENOUS; SUBCUTANEOUS at 18:32

## 2019-07-16 NOTE — ED PROVIDER NOTE - PROGRESS NOTE DETAILS
Patient to be admitted to an inpatient floor. Case discussed with and care endorsed to medical admitting resident. d/w vascular tech- LUE duplex limited 2/2 cast, visualized portion negative for dvt pt signed out to Dr. Cevallos- pending CTA, AP, admit to med. MAR aware

## 2019-07-16 NOTE — ED PROVIDER NOTE - PLAN OF CARE
pt here for LGIB, on asa and heparin for dvt ppx, will get full labs, coags, T&S, CTA abdo-pelvis r/o diverticulitis, assess for active bleed pt here for LGIB, on asa and heparin for dvt ppx, will get full labs, coags, T&S, CTA abdo-pelvis r/o diverticulitis, assess for active bleed. splint loosened and LUE Duplex r/o dvt

## 2019-07-16 NOTE — ED PROVIDER NOTE - PHYSICAL EXAMINATION
PHYSICAL EXAM:    Constitutional: awake, alert, NAD  Eyes: EOMI, no conj injection  HENT: NC AT  Respiratory: no respiratory distress, breath sounds equal b/l, no wheezing, rhonchi or stridor.   Cardiovascular: RRR nml S1S2  Gastrointestinal: soft, no masses, nontender, nondistended. No guarding or rebound. Rectal exam- maroon colored stool  Extremities: LUE splinted w/ mild swelling to hand , cast loosened, cap refil <2 seconds, radial pulse 2+  Neurological: AAOx3, CN II-XII grossly intact, no focal numbness or weakness  Skin: no rash  Musculoskeletal: no gross deformity

## 2019-07-16 NOTE — H&P ADULT - NSHPPHYSICALEXAM_GEN_ALL_CORE
Vital Signs Last 24 Hrs  T(F): 98.3 (16 Jul 2019 00:31), Max: 98.6 (15 Jul 2019 17:16)  HR: 95 (16 Jul 2019 00:31) (82 - 100)  BP: 105/55 (16 Jul 2019 00:31) (105/55 - 124/57)  RR: 16 (16 Jul 2019 00:31) (16 - 18)  SpO2: 100% (16 Jul 2019 00:31) (100% - 100%)    General: awake, alert, NAD  Eyes: EOMI, no conj injection  HENT: NC AT  Respiratory: no respiratory distress, breath sounds equal b/l, no wheezing, rhonchi or stridor.   Cardiovascular: RRR nml S1S2  Gastrointestinal: soft, no masses, nontender, nondistended. No guarding or rebound. Rectal exam- maroon colored stool  Extremities: LUE splinted w/ mild swelling to hand , cap refil <2 seconds, radial pulse 2+  Neurological: AAOx3, CN II-XII grossly intact, no focal numbness or weakness  Skin: no rash  Musculoskeletal: no gross deformity

## 2019-07-16 NOTE — CONSULT NOTE ADULT - SUBJECTIVE AND OBJECTIVE BOX
Patient is a 89y old  Female who presents with a chief complaint of   HPI:  89 F PMHx HTN, HLD and dementia, recent admission for trauma w/ pelvic fx and distal radius/ulna fx to [facility] (Admission -7/3) here for blood in stool and Hb 6.2 in [facility]. Pt daughter / HCP at bedside. She states for 2 days now Pt has had dark DM, dark brown/black BM's and several times with dark clots. No f/c/n/v/cp/sob/dysuria. Daughter states she was doing well in rehab, decent appetite and mental status with intermittent confusion which is baseline. Of note during last course was complicated by episode of unresponsiveness seizures ruled out), JAIDA and hematuria secondary urinary retention and UTI. She was made DNR / DNI and daughter wishes to keep her DNR / DNI and states she does not want aggressive measures, procedures, or surgery. (2019 03:25)      PAST MEDICAL & SURGICAL HISTORY:  Hyperlipidemia  Hypertension  Constipation  TIA (transient ischemic attack)  Urinary retention  No significant past surgical history      Hospital Course:sp transfusion PRBCs    TODAY'S SUBJECTIVE & REVIEW OF SYMPTOMS:     Constitutional Weakness   Cardio WNL   Resp WNL   GI WNL  Heme WNL  Endo WNL  Skin WNL  MSK WNL  Neuro WNL  Cognitive confusion  Psych WNL  + incontinence    MEDICATIONS  (STANDING):  atorvastatin 20 milliGRAM(s) Oral at bedtime  docusate sodium 100 milliGRAM(s) Oral three times a day  donepezil 10 milliGRAM(s) Oral at bedtime  haloperidol     Tablet 1 milliGRAM(s) Oral daily  losartan 25 milliGRAM(s) Oral daily  memantine 10 milliGRAM(s) Oral two times a day  pantoprazole  Injectable 40 milliGRAM(s) IV Push every 12 hours  sodium chloride 0.9%. 1000 milliLiter(s) (75 mL/Hr) IV Continuous <Continuous>  tamsulosin 0.4 milliGRAM(s) Oral at bedtime    MEDICATIONS  (PRN):      FAMILY HISTORY:  No pertinent family history in first degree relatives      Allergies    No Known Allergies    Intolerances        SOCIAL HISTORY:    [    ] Etoh  [    ] Smoking  [    ] Substance abuse     Home Environment:  [    ] Home Alone  [    ] Lives with Family  [    ] Home Health Aid    Dwelling:  [    ] Apartment  [    ] Private House  [    ] Adult Home  [  x  ] Skilled Nursing Facility      [   x ] Short Term  [    ] Long Term  [    ] Stairs                           [    ] Elevator     FUNCTIONAL STATUS PTA: (Check all that apply)  Ambulation: [     ]Independent    [   x ] Dependent     [    ] Non-Ambulatory  Assistive Device: [    ] SA Cane  [    ]  Q Cane  [    ] Walker  [    ]  Wheelchair  ADL : [    ] Independent  [  x  ]  Dependent       Vital Signs Last 24 Hrs  T(C): 37 (2019 08:24), Max: 37 (15 Jul 2019 17:16)  T(F): 98.6 (2019 08:24), Max: 98.6 (15 Jul 2019 17:16)  HR: 62 (2019 08:24) (62 - 100)  BP: 110/51 (2019 08:24) (105/55 - 129/58)  BP(mean): --  RR: 18 (2019 08:24) (16 - 18)  SpO2: 98% (2019 08:24) (98% - 100%)      PHYSICAL EXAM: Alert confused  GENERAL: NAD, well-groomed, well-developed  HEAD:  Atraumatic, Normocephalic  EYES: EOMI, PERRLA, conjunctiva and sclera clear  NECK: Supple  CHEST/LUNG: Clear bilaterally  HEART: Regular rate and rhythm  ABDOMEN: Soft, Nontender, Nondistended; Bowel sounds present  EXTREMITIES:  no calf tenderness,no edema BLES LUE in splint +digital edema    NERVOUS SYSTEM:  Cranial Nerves 2-12 intact [ x   ] Abnormal  [    ]  ROM: WFL all extremities [    ]  Abnormal [   x  ]WFL except L wrist  Motor Strength: WFL all extremities  [    ]  Abnormal [   x ]3-3+/5 except L wrist- No pain L groin  Sensation: intact to light touch [   x ] Abnormal [    ]      FUNCTIONAL STATUS:  Bed Mobility: [   ]  Independent [    ]  Supervision [  x  ]  Needs Assistance [  ]  N/A  Transfers: [    ]  Independent [    ]  Supervision [   x ]  Needs Assistance [    ]  N/A    Ambulation:  [    ]  Independent [    ]  Supervision [   x ]  Needs Assistance [    ]  N/A   ADL:  [    ]   Independent [x    ] Requires Assistance [    ] N/A       LABS:                        7.5    13.65 )-----------( 164      ( 2019 11:36 )             23.4     07-15    135  |  104  |  41<H>  ----------------------------<  104<H>  4.6   |  21  |  0.8    Ca    7.7<L>      15 Jul 2019 19:40    TPro  4.8<L>  /  Alb  2.5<L>  /  TBili  0.3  /  DBili  x   /  AST  17  /  ALT  20  /  AlkPhos  125<H>  07-15    PT/INR - ( 15 Jul 2019 19:40 )   PT: 12.30 sec;   INR: 1.07 ratio         PTT - ( 15 Jul 2019 19:40 )  PTT:26.3 sec  Urinalysis Basic - ( 15 Jul 2019 23:00 )    Color: Light Yellow / Appearance: Clear / S.018 / pH: x  Gluc: x / Ketone: Negative  / Bili: Negative / Urobili: <2 mg/dL   Blood: x / Protein: Trace / Nitrite: Negative   Leuk Esterase: Moderate / RBC: 4 /HPF / WBC 12 /HPF   Sq Epi: x / Non Sq Epi: 9 /HPF / Bacteria: Negative        RADIOLOGY & ADDITIONAL STUDIES:

## 2019-07-16 NOTE — H&P ADULT - ASSESSMENT
89 F PMH HTN, HLD, and Dementia recent admission s/p fall complicated by L eft superior and inferior pubic ramus fx, as well as left distal radius fracture s/p splint, course complicated by episode of unresponsiveness (seizures ruled out), JAIDA and hematuria secondary urinary retention and UTI here for dark colored stool, clots in stool and Hb 6.2 in [facility].     # Acute blood loss anemia secondary GI bleed  - Recent transfusion during hospital stay, Hb was 9.0 on discharge s/p 2 pRBC and hematuria resolved  - s/p 1pRBC in ED. f/u post transfusion CBC. Trend Hb. CTA negative  - Family wants conservative management. Not interested in procedures. Supportive care for now  - 2 18G IV, NPO and IVF.     # superior/inferior pubic ramus fx & L distal radius fracture s/p splinting of LUE   - Pain control. Tylenol PRN. OOB to chair. PT rehab        # Recent UTI s/p Rocephin and PO Vantin likely related to Raman. Resume flomax. TOV  # HTN-  continue Losartan 25mg PO q24  # HLD - continue Lipitor 20mg QHS  # Dementia - continue Memantine and Donepezil   # GI PPx - (X)Protonix IV given bleed  # DVT PPx - (X) SCDs  () Contraindicated given r/o GI bleed.   # Activity -  (X) Increase as Tolerated  () OOB w/ assist  () Bed Rest  # Dispo -   Patient to be discharged when medically optimized.  # Code Status - (X) DNR / DNI  --> MOLST in Chart.

## 2019-07-16 NOTE — H&P ADULT - HISTORY OF PRESENT ILLNESS
89 F PMHx HTN, HLD and dementia, recent admission for trauma w/ pelvic fx and distal radius/ulna fx to [facility] (Admission 6/17-7/3) here for blood in stool and Hb 6.2 in [facility]. Pt daughter / HCP at bedside. She states for 2 days now Pt has had dark DM, dark brown/black BM's and several times with dark clots. No f/c/n/v/cp/sob/dysuria. Daughter states she was doing well in rehab, decent appetite and mental status with intermittent confusion which is baseline. Of note during last course was complicated by episode of unresponsiveness seizures ruled out), JAIDA and hematuria secondary urinary retention and UTI. She was made DNR / DNI and daughter wishes to keep her DNR / DNI and states she does not want aggressive measures, procedures, or surgery.

## 2019-07-16 NOTE — H&P ADULT - ATTENDING COMMENTS
Patient seen and examined independently. Agree with resident note/ history / physical exam and plan of care with following exceptions/additions/updates. Case discussed with house-staff, nursing. resident spoke with daughter.   pt is unable to provide any info.  Vital Signs Last 24 Hrs  T(C): 37 (16 Jul 2019 08:24), Max: 37 (15 Jul 2019 17:16)  T(F): 98.6 (16 Jul 2019 08:24), Max: 98.6 (15 Jul 2019 17:16)  HR: 62 (16 Jul 2019 08:24) (62 - 100)  BP: 110/51 (16 Jul 2019 08:24) (105/55 - 129/59)   RR: 18 (16 Jul 2019 08:24) (16 - 18)  SpO2: 98% (16 Jul 2019 08:24) (98% - 100%)  Physical exam:   constitutional NAD, at this time she is sleeping, according to records she has dementia, Respiratory  lungs CTA, CVS heart RRR, GI: abdomen Soft NT, ND, BS+, left arm has a case on. skin: buttock excoriation but no open wound ( per RN )   neuro exam unable to participate. arousable  ( sleeping now).                         7.9    14.59 )-----------( 168      ( 16 Jul 2019 07:07 )( post transfusion)              24.0   Culture - Urine (06.28.19 @ 07:33)  >100,000 CFU/ml Escherichia coli  >100,000 CFU/ml Morganella morganii    Culture - Urine (06.28.19 @ 07:33)    -  Imipenem: R 4    -  Nitrofurantoin: R 64     -  Tetra/Doxy: R <=2    -  Tigecycline: R <=1    -  Amoxicillin/Clavulanic Acid: R >16/8    -  Ampicillin: R >16     -  Ampicillin/Sulbactam: R >16/8     -  Cefazolin: R >16    -  Cefuroxime: R >16    -  Cephalothin: R >16         < from: CT Angio Abdomen and Pelvis w/ IV Cont (07.16.19 @ 00:59) >    1.  No evidence of intravenous contrast extravasation into bowel lumen to   suggest active gastrointestinal bleed.     2.  Circumferential thickening of the rectum with surrounding   inflammatory changes. Findings may represent stercoral colitis in the   appropriate clinical setting.    3.  Bilateral pleural effusions, new since 6/21/2019.    Additional Findings/Recommendations After Attending Radiologist Review:    Rectal hyperemia without focal contrast extravasation. Healing left   sacral fracture is seen. Left hip subcutaneous fluid collection 4.8 cm,   new. Right psoas 4.3 x 3.4 x 6.5 cm hypoattenuating region consistent   with chronic hematoma.    < end of copied text >    a/p  1- drop in hgb, probably due to GIB , also has a hematoma ( psoas)  , sp transfusion, monitor cbc, if hgb drops further will need to discuss possible IR procedure ( family refusing GI procedure and prefer conservative management ) ,   2- leukocytosis, previously positive uc, repeat uc, also has a new fluid on the hip that could be potentially source of infection, Ortho and ID consult. doubt the source is infected hematoma but will need to discuss with ortho. dc Raman and use intermittent catheterization.   3- dementia, pt's daughter/hcp wants only conservative treatment and no aggressive measures, which is reasonable. DNR , DNI.   4- left upper ext fracture, and pelvic fracture, from last admission, ortho follow up  5-constipation and stercoral colitis , stool softner.   6- Hyperlipidemia  7- Hypertension  8- hx of urinary retension, cont flomax, intermittent cath.   9- DVT prophylaxis , start hep sc( dc if hgb drops again)     #Progress Note Handoff  Pending (specify):  Consults: ID and ortho, tests: uc  Family discussion: resident spoke with daughter/HCP, plan is conservative management only. DNR, DNI  Disposition: SNF vs home with home care( comes from [facility])

## 2019-07-16 NOTE — ED PROVIDER NOTE - CARE PLAN
Principal Discharge DX:	Gastrointestinal hemorrhage, unspecified gastrointestinal hemorrhage type  Assessment and plan of treatment:	pt here for LGIB, on asa and heparin for dvt ppx, will get full labs, coags, T&S, CTA abdo-pelvis r/o diverticulitis, assess for active bleed Principal Discharge DX:	Gastrointestinal hemorrhage, unspecified gastrointestinal hemorrhage type  Assessment and plan of treatment:	pt here for LGIB, on asa and heparin for dvt ppx, will get full labs, coags, T&S, CTA abdo-pelvis r/o diverticulitis, assess for active bleed. splint loosened and LUE Duplex r/o dvt

## 2019-07-16 NOTE — H&P ADULT - NSHPLABSRESULTS_GEN_ALL_CORE
6.8    12.49 )-----------( 206      ( 15 Jul 2019 19:40 )             21.9   07-15    135  |  104  |  41<H>  ----------------------------<  104<H>  4.6   |  21  |  0.8    Ca    7.7<L>      15 Jul 2019 19:40    TPro  4.8<L>  /  Alb  2.5<L>  /  TBili  0.3  /  DBili  x   /  AST  17  /  ALT  20  /  AlkPhos  125<H>  07-15    PT/INR - ( 15 Jul 2019 19:40 )   PT: 12.30 sec;   INR: 1.07 ratio      PTT - ( 15 Jul 2019 19:40 )  PTT:26.3 sec    Lactate, Blood: 1.9 mmol/L (07.15.19 @ 19:40)    < from: CT Angio Abdomen and Pelvis w/ IV Cont (07.16.19 @ 00:59) >    IMPRESSION:    1.  No evidence of intravenous contrast extravasation into bowel lumen to   suggest active gastrointestinal bleed.     2.  Circumferential thickening of the rectum with surrounding   inflammatory changes. Findings may represent stercoral colitis in the   appropriate clinical setting.    3.  Bilateral pleural effusions, new since 6/21/2019.    < end of copied text >

## 2019-07-16 NOTE — ED PROVIDER NOTE - CLINICAL SUMMARY MEDICAL DECISION MAKING FREE TEXT BOX
89 female here for GIB, had labs imaging and transfusion for anemia with CTA for screening of surgical cause of bleeding, no acute findings, remains hemodynamically unchanged and will admit to inpt setting, case d/w hospitalist for admission

## 2019-07-16 NOTE — ED PROVIDER NOTE - OBJECTIVE STATEMENT
89 yo f hx htn, hld, dementia, on asa 81mg and subq heparin, recent admission for trauma w/ pelvic fx and distal radius/ulna fx to LUE here for blood in stool. last night pt had dark bm and throughout the night BM had blood/clot so pt was brought in. no f/c/n/v/cp/sob. no ap/dysuria. no hx EGD/colonoscopy

## 2019-07-17 LAB
ANISOCYTOSIS BLD QL: SLIGHT — SIGNIFICANT CHANGE UP
BASOPHILS # BLD AUTO: 0 K/UL — SIGNIFICANT CHANGE UP (ref 0–0.2)
BASOPHILS NFR BLD AUTO: 0 % — SIGNIFICANT CHANGE UP (ref 0–1)
CULTURE RESULTS: NO GROWTH — SIGNIFICANT CHANGE UP
EOSINOPHIL # BLD AUTO: 0.1 K/UL — SIGNIFICANT CHANGE UP (ref 0–0.7)
EOSINOPHIL NFR BLD AUTO: 0.9 % — SIGNIFICANT CHANGE UP (ref 0–8)
GIANT PLATELETS BLD QL SMEAR: PRESENT — SIGNIFICANT CHANGE UP
HCT VFR BLD CALC: 19.4 % — LOW (ref 37–47)
HGB BLD-MCNC: 6.1 G/DL — CRITICAL LOW (ref 12–16)
HYPOCHROMIA BLD QL: SLIGHT — SIGNIFICANT CHANGE UP
LYMPHOCYTES # BLD AUTO: 1.65 K/UL — SIGNIFICANT CHANGE UP (ref 1.2–3.4)
LYMPHOCYTES # BLD AUTO: 15 % — LOW (ref 20.5–51.1)
MACROCYTES BLD QL: SLIGHT — SIGNIFICANT CHANGE UP
MANUAL SMEAR VERIFICATION: SIGNIFICANT CHANGE UP
MCHC RBC-ENTMCNC: 31.1 PG — HIGH (ref 27–31)
MCHC RBC-ENTMCNC: 31.4 G/DL — LOW (ref 32–37)
MCV RBC AUTO: 99 FL — SIGNIFICANT CHANGE UP (ref 81–99)
METAMYELOCYTES # FLD: 2.6 % — HIGH (ref 0–0)
MONOCYTES # BLD AUTO: 0.1 K/UL — SIGNIFICANT CHANGE UP (ref 0.1–0.6)
MONOCYTES NFR BLD AUTO: 0.9 % — LOW (ref 1.7–9.3)
MYELOCYTES NFR BLD: 1.8 % — HIGH (ref 0–0)
NEUTROPHILS # BLD AUTO: 8.65 K/UL — HIGH (ref 1.4–6.5)
NEUTROPHILS NFR BLD AUTO: 78.8 % — HIGH (ref 42.2–75.2)
PLAT MORPH BLD: NORMAL — SIGNIFICANT CHANGE UP
PLATELET # BLD AUTO: 145 K/UL — SIGNIFICANT CHANGE UP (ref 130–400)
POIKILOCYTOSIS BLD QL AUTO: SLIGHT — SIGNIFICANT CHANGE UP
POLYCHROMASIA BLD QL SMEAR: SLIGHT — SIGNIFICANT CHANGE UP
RBC # BLD: 1.96 M/UL — LOW (ref 4.2–5.4)
RBC # FLD: 19.8 % — HIGH (ref 11.5–14.5)
RBC BLD AUTO: ABNORMAL
SPECIMEN SOURCE: SIGNIFICANT CHANGE UP
WBC # BLD: 10.98 K/UL — HIGH (ref 4.8–10.8)
WBC # FLD AUTO: 10.98 K/UL — HIGH (ref 4.8–10.8)

## 2019-07-17 PROCEDURE — 99233 SBSQ HOSP IP/OBS HIGH 50: CPT

## 2019-07-17 PROCEDURE — 74018 RADEX ABDOMEN 1 VIEW: CPT | Mod: 26

## 2019-07-17 PROCEDURE — 73110 X-RAY EXAM OF WRIST: CPT | Mod: 26,LT

## 2019-07-17 RX ORDER — TRAMADOL HYDROCHLORIDE 50 MG/1
50 TABLET ORAL ONCE
Refills: 0 | Status: DISCONTINUED | OUTPATIENT
Start: 2019-07-17 | End: 2019-07-17

## 2019-07-17 RX ADMIN — TRAMADOL HYDROCHLORIDE 50 MILLIGRAM(S): 50 TABLET ORAL at 22:10

## 2019-07-17 RX ADMIN — SODIUM CHLORIDE 75 MILLILITER(S): 9 INJECTION INTRAMUSCULAR; INTRAVENOUS; SUBCUTANEOUS at 05:59

## 2019-07-17 RX ADMIN — ATORVASTATIN CALCIUM 20 MILLIGRAM(S): 80 TABLET, FILM COATED ORAL at 22:15

## 2019-07-17 RX ADMIN — TAMSULOSIN HYDROCHLORIDE 0.4 MILLIGRAM(S): 0.4 CAPSULE ORAL at 22:16

## 2019-07-17 RX ADMIN — MEMANTINE HYDROCHLORIDE 10 MILLIGRAM(S): 10 TABLET ORAL at 18:09

## 2019-07-17 RX ADMIN — MEMANTINE HYDROCHLORIDE 10 MILLIGRAM(S): 10 TABLET ORAL at 05:59

## 2019-07-17 RX ADMIN — TRAMADOL HYDROCHLORIDE 50 MILLIGRAM(S): 50 TABLET ORAL at 20:28

## 2019-07-17 RX ADMIN — PANTOPRAZOLE SODIUM 40 MILLIGRAM(S): 20 TABLET, DELAYED RELEASE ORAL at 06:32

## 2019-07-17 RX ADMIN — PANTOPRAZOLE SODIUM 40 MILLIGRAM(S): 20 TABLET, DELAYED RELEASE ORAL at 18:09

## 2019-07-17 RX ADMIN — DONEPEZIL HYDROCHLORIDE 10 MILLIGRAM(S): 10 TABLET, FILM COATED ORAL at 22:15

## 2019-07-17 NOTE — PROGRESS NOTE ADULT - SUBJECTIVE AND OBJECTIVE BOX
SUBJECTIVE:    Patient is a 89y old Female who presents with a chief complaint of   Currently admitted to medicine with the primary diagnosis of Gastrointestinal hemorrhage, unspecified gastrointestinal hemorrhage type     Today is hospital day 1d. This morning she is resting comfortably in bed and reports no new issues or overnight events.     INTERVAL EVENTS: Loose tarry BMs     PAST MEDICAL & SURGICAL HISTORY  Hyperlipidemia  Hypertension  Constipation  TIA (transient ischemic attack)  Urinary retention  No significant past surgical history      ALLERGIES:  No Known Allergies    MEDICATIONS:  STANDING MEDICATIONS  atorvastatin 20 milliGRAM(s) Oral at bedtime  donepezil 10 milliGRAM(s) Oral at bedtime  haloperidol     Tablet 1 milliGRAM(s) Oral daily  losartan 25 milliGRAM(s) Oral daily  memantine 10 milliGRAM(s) Oral two times a day  pantoprazole  Injectable 40 milliGRAM(s) IV Push every 12 hours  sodium chloride 0.9%. 1000 milliLiter(s) IV Continuous <Continuous>  tamsulosin 0.4 milliGRAM(s) Oral at bedtime    PRN MEDICATIONS    VITALS:   T(F): 97  HR: 88  BP: 121/60  RR: 18  SpO2: 99%    LABS:                        7.5    13.65 )-----------( 164      ( 2019 11:36 )             23.4     07-15    135  |  104  |  41<H>  ----------------------------<  104<H>  4.6   |  21  |  0.8    Ca    7.7<L>      15 Jul 2019 19:40    TPro  4.8<L>  /  Alb  2.5<L>  /  TBili  0.3  /  DBili  x   /  AST  17  /  ALT  20  /  AlkPhos  125<H>  07-15    PT/INR - ( 15 Jul 2019 19:40 )   PT: 12.30 sec;   INR: 1.07 ratio         PTT - ( 15 Jul 2019 19:40 )  PTT:26.3 sec  Urinalysis Basic - ( 15 Jul 2019 23:00 )    Color: Light Yellow / Appearance: Clear / S.018 / pH: x  Gluc: x / Ketone: Negative  / Bili: Negative / Urobili: <2 mg/dL   Blood: x / Protein: Trace / Nitrite: Negative   Leuk Esterase: Moderate / RBC: 4 /HPF / WBC 12 /HPF   Sq Epi: x / Non Sq Epi: 9 /HPF / Bacteria: Negative      CARDIAC MARKERS ( 15 Jul 2019 19:40 )  x     / 0.02 ng/mL / x     / x     / x          RADIOLOGY:    < from: Xray Wrist 3 Views, Left (19 @ 06:07) >  Findings/  impression: Distal radial fracture with dorsal radial articular surface   tilt present with 6 mm impaction. There is increasing sclerosis at the   distal radial fracture site consistent with partial healing. Ulnar   styloid base avulsion reidentified and unchanged. 1 mm ulnar negative   variance present. Wrist degenerative changes present. Overlying cast   limits assessment of fine underlying bone detail.    < end of copied text >    PHYSICAL EXAM:  GEN: No acute distress  PULM/CHEST: Clear to auscultation bilaterally, no rales, rhonchi or wheezes   CVS: Regular rate and rhythm, S1-S2, no murmurs  ABD: Soft, non-tender, non-distended, +BS  EXT: No edema  NEURO: AAOx1

## 2019-07-17 NOTE — PROGRESS NOTE ADULT - ASSESSMENT
89 F PMH HTN, HLD, and Dementia recent admission s/p fall complicated by L eft superior and inferior pubic ramus fx, as well as left distal radius fracture s/p splint, course complicated by episode of unresponsiveness (seizures ruled out), AJIDA and hematuria secondary urinary retention and UTI here for dark colored stool, clots in stool and Hb 6.2 in [facility].     # Acute blood loss anemia likely due to UGIB - s/p 1 pRBC in ED  - Recent transfusion during hospital stay, on discharge Hgb ~9, s/p 2 pRBC at that time and hematuria resolved  - CTA Abdomen with no blood extravasation  - Stop Aspirin 81 mg  - Family prefers non-invasive management  - Protonix 40 mg IV BID  - Monitor Hgb and transfuse PRN  - Palliative care eval    # Leukocytosis - reactive vs infectious  - Recent UTI due to Raman - discontinued Raman for TOV with intermittent cath. Daughter requesting to replace for comfort  - CT A/P with stercoral colitis  - New fluid collection in hip  - ID eval    # Recent superior/inferior pubic ramus and Left distal radius fracture s/p splinting of LUE   - Pain control   - Ortho eval: repeat X-R, LUE NWB, B/L LE WBAT, no acute intervention  - PT/Rehab eval - STR    # Dementia - Continue Memantine and Donepezil     # HTN - controlled  - Continue Losartan  - DASH diet    # HLD - continue Lipitor    # GI PPx - Protonix IV BID  # DVT PPx - SCDs given active GIB    Dispo: STR once medically stable    DNR/DNI 89 F PMH HTN, HLD, and Dementia recent admission s/p fall complicated by L eft superior and inferior pubic ramus fx, as well as left distal radius fracture s/p splint, course complicated by episode of unresponsiveness (seizures ruled out), JAIDA and hematuria secondary urinary retention and UTI here for dark colored stool, clots in stool and Hb 6.2 in [facility].     # Acute blood loss anemia likely due to GIB - s/p 1 pRBC in ED  - Recent transfusion during hospital stay, on discharge Hgb ~9, s/p 2 pRBC at that time and hematuria resolved  - CTA Abdomen with no blood extravasation  - Stop Aspirin 81 mg  - Family prefers non-invasive management  - Protonix 40 mg IV BID  - Monitor Hgb and transfuse PRN  - Palliative care eval    # Leukocytosis - reactive vs infectious  - Recent UTI due to Raman - discontinued Raman for TOV with intermittent cath. Daughter requesting to replace for comfort  - CT A/P with stercoral colitis  - New fluid collection in hip  - ID eval    # Recent superior/inferior pubic ramus and Left distal radius fracture s/p splinting of LUE   - Pain control   - Ortho eval: repeat X-R, LUE NWB, B/L LE WBAT, no acute intervention  - PT/Rehab eval - STR    # Dementia - Continue Memantine and Donepezil     # HTN - controlled  - Continue Losartan  - DASH diet    # HLD - continue Lipitor    # GI PPx - Protonix IV BID  # DVT PPx - SCDs given active GIB    Dispo: STR once medically stable    DNR/DNI

## 2019-07-17 NOTE — CONSULT NOTE ADULT - SUBJECTIVE AND OBJECTIVE BOX
GARETH CUEVAS  89y, Female  Allergy: No Known Allergies      HPI:  89 F PMHx HTN, HLD and dementia, recent admission for trauma w/ pelvic fx and distal radius/ulna fx to [facility] (Admission -7/3) here for blood in stool and Hb 6.2 in [facility]. Pt daughter / HCP at bedside. She states for 2 days now Pt has had dark DM, dark brown/black BM's and several times with dark clots. No f/c/n/v/cp/sob/dysuria. Daughter states she was doing well in rehab, decent appetite and mental status with intermittent confusion which is baseline. Of note during last course was complicated by episode of unresponsiveness (seizures ruled out), JAIDA and hematuria with urinary retention . She was made DNR / DNI and daughter wishes no  aggressive measures, procedures, or surgery. (2019 03:25)    FAMILY HISTORY:  No pertinent family history in first degree relatives    PAST MEDICAL & SURGICAL HISTORY:  Hyperlipidemia  Hypertension  Constipation  TIA (transient ischemic attack)  Urinary retention  No significant past surgical history        VITALS:  T(F): 97, Max: 98.7 (19 @ 16:08)  HR: 88  BP: 121/60  RR: 18Vital Signs Last 24 Hrs  T(C): 36.1 (2019 07:43), Max: 37.1 (2019 16:08)  T(F): 97 (2019 07:43), Max: 98.7 (2019 16:08)  HR: 88 (2019 07:43) (87 - 99)  BP: 121/60 (2019 07:43) (119/56 - 125/58)  BP(mean): 86 (2019 19:57) (86 - 86)  RR: 18 (2019 07:43) (16 - 19)  SpO2: 99% (2019 07:43) (98% - 100%)    TESTS & MEASUREMENTS:                        7.5    13.65 )-----------( 164      ( 2019 11:36 )             23.4     07-15    135  |  104  |  41<H>  ----------------------------<  104<H>  4.6   |  21  |  0.8    Ca    7.7<L>      15 Jul 2019 19:40    TPro  4.8<L>  /  Alb  2.5<L>  /  TBili  0.3  /  DBili  x   /  AST  17  /  ALT  20  /  AlkPhos  125<H>  07-15    LIVER FUNCTIONS - ( 15 Jul 2019 19:40 )  Alb: 2.5 g/dL / Pro: 4.8 g/dL / ALK PHOS: 125 U/L / ALT: 20 U/L / AST: 17 U/L / GGT: x             Urinalysis Basic - ( 15 Jul 2019 23:00 )    Color: Light Yellow / Appearance: Clear / S.018 / pH: x  Gluc: x / Ketone: Negative  / Bili: Negative / Urobili: <2 mg/dL   Blood: x / Protein: Trace / Nitrite: Negative   Leuk Esterase: Moderate / RBC: 4 /HPF / WBC 12 /HPF   Sq Epi: x / Non Sq Epi: 9 /HPF / Bacteria: Negative          RADIOLOGY & ADDITIONAL TESTS:    ANTIBIOTICS:

## 2019-07-17 NOTE — CONSULT NOTE ADULT - SUBJECTIVE AND OBJECTIVE BOX
REQUESTED OF: DR Glass (formerly Jose)     CLINICAL ISSUE TO BE EVALUATED BY CONSULTANT: Goals of care and symptom management         GARETH CUEVAS 89yFemale  HPI:  89 F PMHx HTN, HLD and dementia, recent admission for trauma w/ pelvic fx and distal radius/ulna fx to [facility] (Admission 6/17-7/3) here for blood in stool and Hb 6.2 in [facility]. Pt daughter / HCP at bedside. She states for 2 days now Pt has had dark DM, dark brown/black BM's and several times with dark clots. No f/c/n/v/cp/sob/dysuria. Daughter states she was doing well in rehab, decent appetite and mental status with intermittent confusion which is baseline. Of note during last course was complicated by episode of unresponsiveness seizures ruled out), JAIDA and hematuria secondary urinary retention and UTI. She was made DNR / DNI and daughter wishes to keep her DNR / DNI and states she does not want aggressive measures, procedures, or surgery. (16 Jul 2019 03:25)        PAST MEDICAL & SURGICAL HISTORY:  Hyperlipidemia  Hypertension  Constipation  TIA (transient ischemic attack)  Urinary retention  No significant past surgical history        PHYSICAL EXAM:      Constitutional: appears comfortable, debilitated with advanced dementia     Respiratory: No dyspnea or tachypnea     Cardiovascular: (-) JVD     Gastrointestinal: soft NT (+) BS      Genitourinary: no s/p tenderness     Extremities: no edema     Neurological: a+ox 1     Skin: intact       T(C): 36.1, Max: 37.1 (16:08)  HR: 88 (87 - 99)  BP: 121/60 (119/56 - 125/58)  RR: 18 (16 - 19)  SpO2: 99% (98% - 100%)      LABS/STUDIES:  07-15    135  |  104  |  41<H>  ----------------------------<  104<H>  4.6   |  21  |  0.8    Ca    7.7<L>      15 Jul 2019 19:40    TPro  4.8<L>  /  Alb  2.5<L>  /  TBili  0.3  /  DBili  x   /  AST  17  /  ALT  20  /  AlkPhos  125<H>  07-15                            7.5    13.65 )-----------( 164      ( 16 Jul 2019 11:36 )             23.4       MEDICATIONS  (STANDING):  atorvastatin 20 milliGRAM(s) Oral at bedtime  donepezil 10 milliGRAM(s) Oral at bedtime  haloperidol     Tablet 1 milliGRAM(s) Oral daily  losartan 25 milliGRAM(s) Oral daily  memantine 10 milliGRAM(s) Oral two times a day  pantoprazole  Injectable 40 milliGRAM(s) IV Push every 12 hours  tamsulosin 0.4 milliGRAM(s) Oral at bedtime    MEDICATIONS  (PRN):        PPS (Palliative Performance Scale): 20

## 2019-07-17 NOTE — CONSULT NOTE ADULT - ASSESSMENT
89yFemale being evaluated for goals of care and symptom management. Pt is not very alert at this time. Responsive to verbal and tactile stimuli. Daughter who is caretaker not at bedside, she was here all night. Daughter had conversation with MD in ER and filled out MOLST. Currently pt is being admitted for anemia r/t UGIB. Pt recently in hospital s/p right pubic rami and iliac fx w UTI and hematuria. Met with palliative MD and SW. Daughter has been mom's caretaker. She wanted STR. Overall pt has very poor prognosis and would be hospice appropriate. At this time according to H&P daughter refused endoscopy but wants blood transfusions. Currently without pain or respiratory distress.          Recommendations:  DNR/DNI  no endoscopy  left message for daughter that we want to meet
IMPRESSION: Rehab of Debilitation secondary to GI Bleed recent L pelvic fx, L wrist fx    PRECAUTIONS: [ x   ] Cardiac  [    ] Respiratory  [    ] Seizures [    ] Contact Isolation  [    ] Droplet Isolation  [    ] Other    Weight Bearing Status: WB LUE with platform                                   PWB LLE    RECOMMENDATION:  ORTHO FU TO ADVANCE WB LLE    Out of Bed to Chair     DVT/Decubiti Prophylaxis    REHAB PLAN:     [  x   ] Bedside P/T 3-5 times a week   [     ] Bedside O/T  2-3 times a week   [     ] No Rehab Therapy Indicated   [     ]  Speech Therapy   Conditioning/ROM                                 ADL  Bed Mobility                                            Conditioning/ROM  Transfers                                                  Bed Mobility  Sitting /Standing Balance                      Transfers                                        Gait Training                                            Sitting/Standing Balance  Stair Training [   ]Applicable                 Home equipment Eval                                                                     Splinting  [   ] Only      GOALS:   ADL   [ x   ]   Independent         Transfers  [ x   ] Independent            Ambulation  [x     ] Independent     [  x   ] With device                            [    ]  CG                                               [    ]  CG                                                    [     ] CG                            [    ] Min A                                          [    ] Min A                                                [     ] Min  A          DISCHARGE PLAN:   [     ]  Good candidate for Intensive Rehabilitation/Hospital based                                             Will tolerate 3hrs Intensive Rehab Daily                                       [   x   ]  Short Term Rehab in Skilled Nursing Facility RETURN TO [facility]                                       [      ]  Home with Outpatient or VN services                                         [      ]  Possible Candidate for Intensive Hospital based Rehab
agree with above  no acute ortho intervention at this time  can fu up as outpatient with Dr Albarran [phone]
89 F PMH HTN, HLD, and Dementia recent admission s/p fall complicated by L eft superior and inferior pubic ramus fx, as well as left distal radius fracture s/p splint, course complicated by episode of unresponsiveness .    No pyuria  WBC 13.6    IMPRESSION:  SIRS response secondary to multiple non infectius issues  Stercoral colitis/ GI bleed with melena  Left sacral Fx  Left distal radial Fx  Right psoas with chronic hematoma  No evidence of pyelonephritis despite retention    RECOMMENDATIONS  No ABx  supportive measures

## 2019-07-18 LAB
ANION GAP SERPL CALC-SCNC: 11 MMOL/L — SIGNIFICANT CHANGE UP (ref 7–14)
BASOPHILS # BLD AUTO: 0.04 K/UL — SIGNIFICANT CHANGE UP (ref 0–0.2)
BASOPHILS NFR BLD AUTO: 0.4 % — SIGNIFICANT CHANGE UP (ref 0–1)
BLD GP AB SCN SERPL QL: SIGNIFICANT CHANGE UP
BUN SERPL-MCNC: 34 MG/DL — HIGH (ref 10–20)
CALCIUM SERPL-MCNC: 8 MG/DL — LOW (ref 8.5–10.1)
CHLORIDE SERPL-SCNC: 118 MMOL/L — HIGH (ref 98–110)
CO2 SERPL-SCNC: 21 MMOL/L — SIGNIFICANT CHANGE UP (ref 17–32)
CREAT SERPL-MCNC: 0.9 MG/DL — SIGNIFICANT CHANGE UP (ref 0.7–1.5)
EOSINOPHIL # BLD AUTO: 0.24 K/UL — SIGNIFICANT CHANGE UP (ref 0–0.7)
EOSINOPHIL NFR BLD AUTO: 2.5 % — SIGNIFICANT CHANGE UP (ref 0–8)
GLUCOSE SERPL-MCNC: 100 MG/DL — HIGH (ref 70–99)
HCT VFR BLD CALC: 28 % — LOW (ref 37–47)
HGB BLD-MCNC: 8.7 G/DL — LOW (ref 12–16)
IMM GRANULOCYTES NFR BLD AUTO: 1.6 % — HIGH (ref 0.1–0.3)
LYMPHOCYTES # BLD AUTO: 1.84 K/UL — SIGNIFICANT CHANGE UP (ref 1.2–3.4)
LYMPHOCYTES # BLD AUTO: 18.9 % — LOW (ref 20.5–51.1)
MAGNESIUM SERPL-MCNC: 1.8 MG/DL — SIGNIFICANT CHANGE UP (ref 1.8–2.4)
MCHC RBC-ENTMCNC: 29.1 PG — SIGNIFICANT CHANGE UP (ref 27–31)
MCHC RBC-ENTMCNC: 31.1 G/DL — LOW (ref 32–37)
MCV RBC AUTO: 93.6 FL — SIGNIFICANT CHANGE UP (ref 81–99)
MONOCYTES # BLD AUTO: 0.89 K/UL — HIGH (ref 0.1–0.6)
MONOCYTES NFR BLD AUTO: 9.2 % — SIGNIFICANT CHANGE UP (ref 1.7–9.3)
NEUTROPHILS # BLD AUTO: 6.55 K/UL — HIGH (ref 1.4–6.5)
NEUTROPHILS NFR BLD AUTO: 67.4 % — SIGNIFICANT CHANGE UP (ref 42.2–75.2)
NRBC # BLD: 0 /100 WBCS — SIGNIFICANT CHANGE UP (ref 0–0)
PLATELET # BLD AUTO: 113 K/UL — LOW (ref 130–400)
POTASSIUM SERPL-MCNC: 4.4 MMOL/L — SIGNIFICANT CHANGE UP (ref 3.5–5)
POTASSIUM SERPL-SCNC: 4.4 MMOL/L — SIGNIFICANT CHANGE UP (ref 3.5–5)
RBC # BLD: 2.95 M/UL — LOW (ref 4.2–5.4)
RBC # BLD: 2.99 M/UL — LOW (ref 4.2–5.4)
RBC # FLD: 19.7 % — HIGH (ref 11.5–14.5)
RETICS #: 179.1 K/UL — HIGH (ref 25–125)
RETICS/RBC NFR: 6.1 % — HIGH (ref 0.5–1.5)
SODIUM SERPL-SCNC: 150 MMOL/L — HIGH (ref 135–146)
WBC # BLD: 9.72 K/UL — SIGNIFICANT CHANGE UP (ref 4.8–10.8)
WBC # FLD AUTO: 9.72 K/UL — SIGNIFICANT CHANGE UP (ref 4.8–10.8)

## 2019-07-18 PROCEDURE — 76770 US EXAM ABDO BACK WALL COMP: CPT | Mod: 26

## 2019-07-18 PROCEDURE — 99233 SBSQ HOSP IP/OBS HIGH 50: CPT

## 2019-07-18 RX ORDER — MORPHINE SULFATE 50 MG/1
5 CAPSULE, EXTENDED RELEASE ORAL EVERY 4 HOURS
Refills: 0 | Status: DISCONTINUED | OUTPATIENT
Start: 2019-07-18 | End: 2019-07-19

## 2019-07-18 RX ORDER — CHLORHEXIDINE GLUCONATE 213 G/1000ML
1 SOLUTION TOPICAL
Refills: 0 | Status: DISCONTINUED | OUTPATIENT
Start: 2019-07-18 | End: 2019-07-18

## 2019-07-18 RX ORDER — MORPHINE SULFATE 50 MG/1
5 CAPSULE, EXTENDED RELEASE ORAL EVERY 4 HOURS
Refills: 0 | Status: DISCONTINUED | OUTPATIENT
Start: 2019-07-18 | End: 2019-07-21

## 2019-07-18 RX ADMIN — PANTOPRAZOLE SODIUM 40 MILLIGRAM(S): 20 TABLET, DELAYED RELEASE ORAL at 05:48

## 2019-07-18 RX ADMIN — MEMANTINE HYDROCHLORIDE 10 MILLIGRAM(S): 10 TABLET ORAL at 05:48

## 2019-07-18 RX ADMIN — LOSARTAN POTASSIUM 25 MILLIGRAM(S): 100 TABLET, FILM COATED ORAL at 05:48

## 2019-07-18 RX ADMIN — CHLORHEXIDINE GLUCONATE 1 APPLICATION(S): 213 SOLUTION TOPICAL at 05:48

## 2019-07-18 NOTE — CHART NOTE - NSCHARTNOTEFT_GEN_A_CORE
Palliative Care Biopsychosocial Assessment:    Palliative care team met with patient's daughter at bedside to discuss overall goals of care. Patient and daughter is known to the palliative care team from previous admission. Reviewed patient's current diagnosis, prognosis and treatment options.  Patients daughter verbalized clear understanding of patient's multiple medical conditions and patient's end of life status. Patient's daughter wishes for patient to be comfortable and pain free.    Plan for complete comfort measures. No further blood work or finger sticks. No IV hydration or artifical nutrition. No pulse oximetry. No further blood transfusions. Hospice consult for hospice care at home. DNR/DNI. Continue nuñez catheter use for comfort.  Patients daughter had presented a HCP and Living Will reflecting patient's wishes.  Palliative care team will continue to monitor and provide support.      Patient Coping Status:       [ x  ]   coping well        [   ]    coping with  some difficulty       [   ]   difficulty coping     [   ]   other                                                       Patient Emotional Status:     [   ]   anxious         [   ]   depressed           [   ]  overwhelmed          [   ]   angry         [ x  ]accepting       [   ]   not accepting           [   ]   other     Patient Mental Status:      [   ]   alert              [   ]   oriented         [    ]   confused         [ x  ] lethargic         [   ]   non-responsive   [   ]   other     Advance Directives:     [   ]    Health Care Surrogate: Name:                             [ x  ]    Health Care Proxy: Name: Natalia Rojas  [ x  ]    MOLST  [  x ]    Living Will  [ x  ]    DNR  [  x ]    DNI    Patient Needs:     [   ]   Supportive Counseling                  [   ]   Family Meeting            [   ]    Education                           [  x ]   Advance Care Planning         Caregiver Name: Natalia Rojas  Caregiver needs:     [  x ]   Supportive Counseling      [   ]   Family Conference      [ x  ]   Education    [   ]   Other     Referral:      [   ]   Community Resources         [   ]   Cancer Support Group     [  x ]    Hospice       [x   ]   Bereavement support     [   ]   Pastoral Care      [   ]  [facility] [place]       [   ]  Child Life Services     [   ]   Other                    Spectra #: [phone]

## 2019-07-18 NOTE — PROGRESS NOTE ADULT - SUBJECTIVE AND OBJECTIVE BOX
GARETH CUEVAS 89y Female  MRN#: [id number]   CODE STATUS: FULL  Do Not Resuscitate      SUBJECTIVE  Patient is a 89y old Female who presents with a chief complaint of UGIB (17 Jul 2019 15:50)    Currently admitted to medicine with the primary diagnosis of melena secondary to GI bleed of unknown source    Today is hospital day 2d, and this morning she is laying in bed, was not speaking. Her relative was beside her  and reports no overnight events.   Denies chest pain, shortness of breath, nausea, vomiting or changes in bowel habits.   has no complaints today.     Present Today:           Raman Catheter  (x)Yes? Indwelling Urethral Catheter    Indication:  urinary retention       OBJECTIVE  PAST MEDICAL & SURGICAL HISTORY  Hyperlipidemia  Hypertension  Constipation  TIA (transient ischemic attack)  Urinary retention  No significant past surgical history    ALLERGIES:  No Known Allergies    HOME MEDICATIONS:  Home Medications:  aspirin 81 mg oral tablet: 1 tab(s) orally once a day (21 Jun 2019 03:39)  donepezil 10 mg oral tablet: 1 tab(s) orally once a day (at bedtime) (21 Jun 2019 03:39)  irbesartan-hydrochlorothiazide 150mg-12.5mg oral tablet: 1 tab(s) orally once a day (21 Jun 2019 03:39)  lovastatin 20 mg oral tablet: 1 tab(s) orally once a day (21 Jun 2019 03:39)  melatonin 5 mg oral tablet: 1 tab(s) orally once a day (at bedtime) (03 Jul 2019 09:52)  memantine 10 mg oral tablet: 1 tab(s) orally 2 times a day (21 Jun 2019 03:39)  tamsulosin 0.4 mg oral capsule: 1 cap(s) orally once a day (at bedtime) (03 Jul 2019 09:52)    MEDICATIONS:  STANDING MEDICATIONS  atorvastatin 20 milliGRAM(s) Oral at bedtime  chlorhexidine 4% Liquid 1 Application(s) Topical <User Schedule>  donepezil 10 milliGRAM(s) Oral at bedtime  losartan 25 milliGRAM(s) Oral daily  memantine 10 milliGRAM(s) Oral two times a day  pantoprazole  Injectable 40 milliGRAM(s) IV Push every 12 hours  tamsulosin 0.4 milliGRAM(s) Oral at bedtime    PRN MEDICATIONS      VITAL SIGNS: Last 24 Hours  T(C): 36.7 (18 Jul 2019 06:00), Max: 36.9 (17 Jul 2019 15:51)  T(F): 98 (18 Jul 2019 06:00), Max: 98.5 (17 Jul 2019 20:27)  HR: 80 (18 Jul 2019 06:00) (80 - 86)  BP: 127/60 (18 Jul 2019 06:00) (125/70 - 135/59)  BP(mean): --  RR: 18 (18 Jul 2019 06:00) (18 - 18)  SpO2: 99% (17 Jul 2019 23:15) (98% - 99%)    LABS:                        8.7    9.72  )-----------( 113      ( 18 Jul 2019 06:50 )             28.0     07-18    150<H>  |  118<H>  |  34<H>  ----------------------------<  100<H>  4.4   |  21  |  0.9    Ca    8.0<L>      18 Jul 2019 06:50  Mg     1.8     07-18    Culture - Urine (collected 16 Jul 2019 14:43)  Source: .Urine Clean Catch (Midstream)  Final Report (17 Jul 2019 23:56):    No growth      RADIOLOGY:  < from: Xray Abdomen 1 View PORTABLE -Routine (07.17.19 @ 15:45) >  Nonobstructive bowel gas pattern.    < from: CT Angio Abdomen and Pelvis w/ IV Cont (07.16.19 @ 00:59) >  1.  No evidence of intravenous contrast extravasation into bowel lumen to   suggest active gastrointestinal bleed.     2.  Circumferential thickening of the rectum with surrounding   inflammatory changes. Findings may represent stercoral colitis in the   appropriate clinical setting.    3.  Bilateral pleural effusions, new since 6/21/2019.    Additional Findings/Recommendations After Attending Radiologist Review:    Rectal hyperemia without focal contrast extravasation. Healing left   sacral fracture is seen. Left hip subcutaneous fluid collection 4.8 cm,   new. Right psoas 4.3 x 3.4 x 6.5 cm hypoattenuating region consistent   with chronic hematoma.    PHYSICAL EXAM:  GENERAL: NAD, well-developed, AAOx1, mental status with intermittent confusion which is baseline  HEENT:  Atraumatic, Normocephalic. EOMI, PERRLA, conjunctiva and sclera clear, No JVD  PULMONARY: Clear to auscultation bilaterally; No wheeze  CARDIOVASCULAR: Regular rate and rhythm; No murmurs, rubs, or gallops  GASTROINTESTINAL: Soft, Nontender, Nondistended; Bowel sounds present  MUSCULOSKELETAL:  2+ Peripheral Pulses, No clubbing, cyanosis, or edema  NEUROLOGY: non-focal  SKIN: No rashes or lesions    ASSESSMENT & PLAN GARETH CUEVAS 89y Female  MRN#: [id number]   CODE STATUS:  Do Not Resuscitate/DNI      SUBJECTIVE  Patient is a 89y old Female who presents with a chief complaint of UGIB (17 Jul 2019 15:50)    Currently admitted to medicine with the primary diagnosis of melena secondary to GI bleed of unknown source    Today is hospital day 2d, and this morning she is laying in bed, was not speaking. Her relative was beside her, spoke to the daughter on phone. Daughter wanted the Raman's to be in place and overnight her daughter was not happy about the room . Daughter also wanted to speak to palliative care and also speak about goals of care. The patient had a green stool yesterday night. She did not have any bowel movement today. Denies chest pain, shortness of breath, nausea, vomiting or changes in bowel habits.    Present Today:     Raman Catheter  (x)Yes? Indwelling Urethral Catheter    Indication:  urinary retention       OBJECTIVE  PAST MEDICAL & SURGICAL HISTORY  Hyperlipidemia  Hypertension  Constipation  TIA (transient ischemic attack)  Urinary retention  No significant past surgical history    ALLERGIES:  No Known Allergies    HOME MEDICATIONS:  Home Medications:  aspirin 81 mg oral tablet: 1 tab(s) orally once a day (21 Jun 2019 03:39)  donepezil 10 mg oral tablet: 1 tab(s) orally once a day (at bedtime) (21 Jun 2019 03:39)  irbesartan-hydrochlorothiazide 150mg-12.5mg oral tablet: 1 tab(s) orally once a day (21 Jun 2019 03:39)  lovastatin 20 mg oral tablet: 1 tab(s) orally once a day (21 Jun 2019 03:39)  melatonin 5 mg oral tablet: 1 tab(s) orally once a day (at bedtime) (03 Jul 2019 09:52)  memantine 10 mg oral tablet: 1 tab(s) orally 2 times a day (21 Jun 2019 03:39)  tamsulosin 0.4 mg oral capsule: 1 cap(s) orally once a day (at bedtime) (03 Jul 2019 09:52)    MEDICATIONS:  STANDING MEDICATIONS  atorvastatin 20 milliGRAM(s) Oral at bedtime  chlorhexidine 4% Liquid 1 Application(s) Topical <User Schedule>  donepezil 10 milliGRAM(s) Oral at bedtime  losartan 25 milliGRAM(s) Oral daily  memantine 10 milliGRAM(s) Oral two times a day  pantoprazole  Injectable 40 milliGRAM(s) IV Push every 12 hours  tamsulosin 0.4 milliGRAM(s) Oral at bedtime    PRN MEDICATIONS      VITAL SIGNS: Last 24 Hours  T(C): 36.7 (18 Jul 2019 06:00), Max: 36.9 (17 Jul 2019 15:51)  T(F): 98 (18 Jul 2019 06:00), Max: 98.5 (17 Jul 2019 20:27)  HR: 80 (18 Jul 2019 06:00) (80 - 86)  BP: 127/60 (18 Jul 2019 06:00) (125/70 - 135/59)  BP(mean): --  RR: 18 (18 Jul 2019 06:00) (18 - 18)  SpO2: 99% (17 Jul 2019 23:15) (98% - 99%)    LABS:                        8.7    9.72  )-----------( 113      ( 18 Jul 2019 06:50 )             28.0     07-18    150<H>  |  118<H>  |  34<H>  ----------------------------<  100<H>  4.4   |  21  |  0.9    Ca    8.0<L>      18 Jul 2019 06:50  Mg     1.8     07-18    Culture - Urine (collected 16 Jul 2019 14:43)  Source: .Urine Clean Catch (Midstream)  Final Report (17 Jul 2019 23:56):    No growth      RADIOLOGY:  < from: Xray Abdomen 1 View PORTABLE -Routine (07.17.19 @ 15:45) >  Nonobstructive bowel gas pattern.    < from: CT Angio Abdomen and Pelvis w/ IV Cont (07.16.19 @ 00:59) >  1.  No evidence of intravenous contrast extravasation into bowel lumen to   suggest active gastrointestinal bleed.     2.  Circumferential thickening of the rectum with surrounding   inflammatory changes. Findings may represent stercoral colitis in the   appropriate clinical setting.    3.  Bilateral pleural effusions, new since 6/21/2019.    Additional Findings/Recommendations After Attending Radiologist Review:    Rectal hyperemia without focal contrast extravasation. Healing left   sacral fracture is seen. Left hip subcutaneous fluid collection 4.8 cm,   new. Right psoas 4.3 x 3.4 x 6.5 cm hypoattenuating region consistent   with chronic hematoma.    PHYSICAL EXAM:  GENERAL: NAD, well-developed, AAOx1, mental status with intermittent confusion which is baseline  HEENT:  Atraumatic, Normocephalic. EOMI, PERRLA, conjunctiva and sclera clear, No JVD  PULMONARY: Clear to auscultation bilaterally; No wheeze  CARDIOVASCULAR: Regular rate and rhythm; No murmurs, rubs, or gallops  GASTROINTESTINAL: Soft, Nontender, Nondistended; Bowel sounds present  MUSCULOSKELETAL:  2+ Peripheral Pulses, No clubbing, cyanosis, or edema  NEUROLOGY: non-focal  SKIN: No rashes or lesions    ASSESSMENT & PLAN  89 F PMH HTN, HLD, and Dementia recent admission s/p fall complicated by Left superior and inferior pubic ramus fracture, as well as left distal radius fracture s/p splint, course complicated by episode of unresponsiveness (seizures ruled out), JAIDA and hematuria secondary urinary retention and UTI here for dark colored stool, clots in stool and Hb 6.2 in [facility].     # Acute blood loss anemia likely due to GIB - s/p 2 pRBC in 1 in ED and 1 yesterday night  -Posttransfusion H  - Recent 2  transfusion during hospital stay, on discharge Hgb ~9, s/p 2 pRBC at that time and hematuria resolved  - CTA Abdomen with no blood extravasation  -Aspirin 81mg held  - Family prefers non-invasive management  - Protonix 40 mg IV BID  - Palliative care eval    # Leukocytosis - reactive vs infectious  - Recent UTI due to Raman - discontinued Raman for TOV with intermittent cath. Daughter requesting to replace for comfort  - CT A/P with stercoral colitis  - New fluid collection in hip  - ID eval    # Recent superior/inferior pubic ramus and Left distal radius fracture s/p splinting of LUE   - Pain control   - Ortho eval: repeat X-R, LUE NWB, B/L LE WBAT, no acute intervention  - PT/Rehab eval - STR    # Dementia - Continue Memantine and Donepezil     # HTN - controlled  - Continue Losartan  - DASH diet    # HLD - continue Lipitor    # GI PPx - Protonix IV BID  # DVT PPx - SCDs given active GIB    Dispo: SNF with STR once medically stable    DNR/DNI GARETH CUEVAS 89y Female  MRN#: [id number]   CODE STATUS:  Do Not Resuscitate/DNI      SUBJECTIVE  Patient is a 89y old Female who presents with a chief complaint of UGIB (17 Jul 2019 15:50)    Currently admitted to medicine with the primary diagnosis of melena secondary to GI bleed of unknown source    Today is hospital day 2d, and this morning she is laying in bed, was not speaking. Her relative was beside her, spoke to the daughter on phone. Daughter wanted the Raman's to be in place and overnight her daughter was not happy about the room . Daughter also wanted to speak to palliative care and also speak about goals of care. The patient had a green stool yesterday night. She did not have any bowel movement today. Denies chest pain, shortness of breath, nausea, vomiting or changes in bowel habits.    Present Today:     Raman Catheter  (x)Yes? Indwelling Urethral Catheter    Indication:  urinary retention       OBJECTIVE  PAST MEDICAL & SURGICAL HISTORY  Hyperlipidemia  Hypertension  Constipation  TIA (transient ischemic attack)  Urinary retention  No significant past surgical history    ALLERGIES:  No Known Allergies    HOME MEDICATIONS:  Home Medications:  aspirin 81 mg oral tablet: 1 tab(s) orally once a day (21 Jun 2019 03:39)  donepezil 10 mg oral tablet: 1 tab(s) orally once a day (at bedtime) (21 Jun 2019 03:39)  irbesartan-hydrochlorothiazide 150mg-12.5mg oral tablet: 1 tab(s) orally once a day (21 Jun 2019 03:39)  lovastatin 20 mg oral tablet: 1 tab(s) orally once a day (21 Jun 2019 03:39)  melatonin 5 mg oral tablet: 1 tab(s) orally once a day (at bedtime) (03 Jul 2019 09:52)  memantine 10 mg oral tablet: 1 tab(s) orally 2 times a day (21 Jun 2019 03:39)  tamsulosin 0.4 mg oral capsule: 1 cap(s) orally once a day (at bedtime) (03 Jul 2019 09:52)    MEDICATIONS:  STANDING MEDICATIONS  atorvastatin 20 milliGRAM(s) Oral at bedtime  chlorhexidine 4% Liquid 1 Application(s) Topical <User Schedule>  donepezil 10 milliGRAM(s) Oral at bedtime  losartan 25 milliGRAM(s) Oral daily  memantine 10 milliGRAM(s) Oral two times a day  pantoprazole  Injectable 40 milliGRAM(s) IV Push every 12 hours  tamsulosin 0.4 milliGRAM(s) Oral at bedtime    PRN MEDICATIONS      VITAL SIGNS: Last 24 Hours  T(C): 36.7 (18 Jul 2019 06:00), Max: 36.9 (17 Jul 2019 15:51)  T(F): 98 (18 Jul 2019 06:00), Max: 98.5 (17 Jul 2019 20:27)  HR: 80 (18 Jul 2019 06:00) (80 - 86)  BP: 127/60 (18 Jul 2019 06:00) (125/70 - 135/59)  BP(mean): --  RR: 18 (18 Jul 2019 06:00) (18 - 18)  SpO2: 99% (17 Jul 2019 23:15) (98% - 99%)    LABS:                        8.7    9.72  )-----------( 113      ( 18 Jul 2019 06:50 )             28.0     07-18    150<H>  |  118<H>  |  34<H>  ----------------------------<  100<H>  4.4   |  21  |  0.9    Ca    8.0<L>      18 Jul 2019 06:50  Mg     1.8     07-18    Culture - Urine (collected 16 Jul 2019 14:43)  Source: .Urine Clean Catch (Midstream)  Final Report (17 Jul 2019 23:56):    No growth      RADIOLOGY:  < from: Xray Abdomen 1 View PORTABLE -Routine (07.17.19 @ 15:45) >  Nonobstructive bowel gas pattern.    < from: CT Angio Abdomen and Pelvis w/ IV Cont (07.16.19 @ 00:59) >  1.  No evidence of intravenous contrast extravasation into bowel lumen to   suggest active gastrointestinal bleed.     2.  Circumferential thickening of the rectum with surrounding   inflammatory changes. Findings may represent stercoral colitis in the   appropriate clinical setting.    3.  Bilateral pleural effusions, new since 6/21/2019.    Additional Findings/Recommendations After Attending Radiologist Review:    Rectal hyperemia without focal contrast extravasation. Healing left   sacral fracture is seen. Left hip subcutaneous fluid collection 4.8 cm,   new. Right psoas 4.3 x 3.4 x 6.5 cm hypoattenuating region consistent   with chronic hematoma.    PHYSICAL EXAM:  GENERAL: NAD, well-developed, AAOx1, mental status with intermittent confusion which is baseline  HEENT:  Atraumatic, Normocephalic. EOMI, PERRLA, conjunctiva and sclera clear, No JVD  PULMONARY: Clear to auscultation bilaterally; No wheeze  CARDIOVASCULAR: Regular rate and rhythm; No murmurs, rubs, or gallops  GASTROINTESTINAL: Soft, Nontender, Nondistended; Bowel sounds present  MUSCULOSKELETAL:  2+ Peripheral Pulses, No clubbing, cyanosis, or edema  NEUROLOGY: non-focal  SKIN: No rashes or lesions    ASSESSMENT & PLAN  89 F PMH HTN, HLD, and Dementia recent admission s/p fall complicated by Left superior and inferior pubic ramus fracture, as well as left distal radius fracture s/p splint, course complicated by episode of unresponsiveness (seizures ruled out), JAIDA and hematuria secondary urinary retention and UTI here for dark colored stool, clots in stool and Hb 6.2 in [facility].     #Advanced dementia  - Palliative care spoke to the patient and family. The daughter is the health care proxy and she needs only comfort measures  -Patient is having only couple of spoons of food which is not enough to sustain herself.  -Family denies NG/PEG tube feeds and denied any further blood transfusions.  - Continue Memantine and Donepezil   -Hospice consult-pending    # Acute blood loss anemia likely due to GIB - s/p 2 pRBC in 1 in ED and 1 yesterday night  -She had green colored stool yesterday night.  -Posttransfusion Hb is 8.7  - CTA Abdomen with no blood extravasation  -Aspirin 81mg held  - Family prefers non-invasive management  - Protonix 40 mg IV BID      # Leukocytosis - reactive vs infectious  - Recent UTI due to Raman - discontinued Raman for TOV with intermittent cath. Daughter requesting to replace for comfort  - CT A/P with stercoral colitis  - ID eval- no ABs     # Recent superior/inferior pubic ramus and Left distal radius fracture s/p splinting of LUE     -New fluid collection subcutaneously in the left hip area>>Ortho consulted and rec no acute interventions for now.  - Pain control   - Ortho eval: repeat X-R, LUE NWB, B/L LE WBAT, no acute intervention  - PT/Rehab eval - STR    # HTN - controlled  - Continue Losartan  - DASH diet    # HLD - continue Lipitor    # GI PPx - Protonix IV BID  # DVT PPx - SCDs given active GIB    Dispo: Hospice consult pending    DNR/DNI/only comfort measures GARETH CUEVAS 89y Female  MRN#: [id number]   CODE STATUS:  Do Not Resuscitate/DNI      SUBJECTIVE  Patient is a 89y old Female who presents with a chief complaint of UGIB (17 Jul 2019 15:50)    Currently admitted to medicine with the primary diagnosis of melena secondary to GI bleed of unknown source    Today is hospital day 2d, and this morning she is laying in bed, was not speaking. Her relative was beside her, spoke to the daughter on phone. Daughter wanted the Raman's to be in place and overnight her daughter was not happy about the room . Daughter also wanted to speak to palliative care and also speak about goals of care. The patient had a green stool yesterday night. She did not have any bowel movement today. Denies chest pain, shortness of breath, nausea, vomiting or changes in bowel habits.    Present Today:     Raman Catheter  (x)Yes? Indwelling Urethral Catheter    Indication:  urinary retention       OBJECTIVE  PAST MEDICAL & SURGICAL HISTORY  Hyperlipidemia  Hypertension  Constipation  TIA (transient ischemic attack)  Urinary retention  No significant past surgical history    ALLERGIES:  No Known Allergies    HOME MEDICATIONS:  Home Medications:  aspirin 81 mg oral tablet: 1 tab(s) orally once a day (21 Jun 2019 03:39)  donepezil 10 mg oral tablet: 1 tab(s) orally once a day (at bedtime) (21 Jun 2019 03:39)  irbesartan-hydrochlorothiazide 150mg-12.5mg oral tablet: 1 tab(s) orally once a day (21 Jun 2019 03:39)  lovastatin 20 mg oral tablet: 1 tab(s) orally once a day (21 Jun 2019 03:39)  melatonin 5 mg oral tablet: 1 tab(s) orally once a day (at bedtime) (03 Jul 2019 09:52)  memantine 10 mg oral tablet: 1 tab(s) orally 2 times a day (21 Jun 2019 03:39)  tamsulosin 0.4 mg oral capsule: 1 cap(s) orally once a day (at bedtime) (03 Jul 2019 09:52)    MEDICATIONS:  STANDING MEDICATIONS  atorvastatin 20 milliGRAM(s) Oral at bedtime  chlorhexidine 4% Liquid 1 Application(s) Topical <User Schedule>  donepezil 10 milliGRAM(s) Oral at bedtime  losartan 25 milliGRAM(s) Oral daily  memantine 10 milliGRAM(s) Oral two times a day  pantoprazole  Injectable 40 milliGRAM(s) IV Push every 12 hours  tamsulosin 0.4 milliGRAM(s) Oral at bedtime    PRN MEDICATIONS      VITAL SIGNS: Last 24 Hours  T(C): 36.7 (18 Jul 2019 06:00), Max: 36.9 (17 Jul 2019 15:51)  T(F): 98 (18 Jul 2019 06:00), Max: 98.5 (17 Jul 2019 20:27)  HR: 80 (18 Jul 2019 06:00) (80 - 86)  BP: 127/60 (18 Jul 2019 06:00) (125/70 - 135/59)  BP(mean): --  RR: 18 (18 Jul 2019 06:00) (18 - 18)  SpO2: 99% (17 Jul 2019 23:15) (98% - 99%)    LABS:                        8.7    9.72  )-----------( 113      ( 18 Jul 2019 06:50 )             28.0     07-18    150<H>  |  118<H>  |  34<H>  ----------------------------<  100<H>  4.4   |  21  |  0.9    Ca    8.0<L>      18 Jul 2019 06:50  Mg     1.8     07-18    Culture - Urine (collected 16 Jul 2019 14:43)  Source: .Urine Clean Catch (Midstream)  Final Report (17 Jul 2019 23:56):    No growth      RADIOLOGY:  < from: Xray Abdomen 1 View PORTABLE -Routine (07.17.19 @ 15:45) >  Nonobstructive bowel gas pattern.    < from: CT Angio Abdomen and Pelvis w/ IV Cont (07.16.19 @ 00:59) >  1.  No evidence of intravenous contrast extravasation into bowel lumen to   suggest active gastrointestinal bleed.     2.  Circumferential thickening of the rectum with surrounding   inflammatory changes. Findings may represent stercoral colitis in the   appropriate clinical setting.    3.  Bilateral pleural effusions, new since 6/21/2019.    Additional Findings/Recommendations After Attending Radiologist Review:    Rectal hyperemia without focal contrast extravasation. Healing left   sacral fracture is seen. Left hip subcutaneous fluid collection 4.8 cm,   new. Right psoas 4.3 x 3.4 x 6.5 cm hypoattenuating region consistent   with chronic hematoma.    PHYSICAL EXAM:  GENERAL: NAD, well-developed, AAOx1, mental status with intermittent confusion which is baseline  HEENT:  Atraumatic, Normocephalic. EOMI, PERRLA, conjunctiva and sclera clear, No JVD  PULMONARY: Clear to auscultation bilaterally; No wheeze  CARDIOVASCULAR: Regular rate and rhythm; No murmurs, rubs, or gallops  GASTROINTESTINAL: Soft, Nontender, Nondistended; Bowel sounds present  MUSCULOSKELETAL:  2+ Peripheral Pulses, No clubbing, cyanosis, or edema  NEUROLOGY: non-focal  SKIN: No rashes or lesions    ASSESSMENT & PLAN  89 F PMH HTN, HLD, and Dementia recent admission s/p fall complicated by Left superior and inferior pubic ramus fracture, as well as left distal radius fracture s/p splint, course complicated by episode of unresponsiveness (seizures ruled out), JAIDA and hematuria secondary urinary retention and UTI here for dark colored stool, clots in stool and Hb 6.2 in [facility].     #Advanced dementia  - Palliative care spoke to the patient and family. The daughter is the health care proxy and she needs only comfort measures  -D/c ricept and namaneda, atorvastatin  -IV  pantoprazole changed  to po   -Roxanol 5mg Sl Q 4 hrs PRN  -Ativan 0.5mg po Q 4 hrs PRN for agitation  -Patient is having only couple of spoons of food which is not enough to sustain herself.  -Family denies NG/PEG tube feeds and denied any further blood transfusions.  - Continue Memantine and Donepezil   -Hospice consult- admission available on Sunday  -MOLST form signed by daughter Natalia(Health care proxy)    # Acute blood loss anemia likely due to GIB - s/p 2 pRBC in 1 in ED and 1 yesterday night  -resolved  -She had green colored stool yesterday night.  -Posttransfusion Hb is 8.7  - CTA Abdomen with no blood extravasation  -Aspirin 81mg held  - Family prefers non-invasive management      # Leukocytosis - reactive vs infectious  - Recent UTI due to Raman - discontinued Raman for TOV with intermittent cath. Daughter requesting to replace for comfort  - CT A/P with stercoral colitis  - ID eval- no ABs     # Recent superior/inferior pubic ramus and Left distal radius fracture s/p splinting of LUE     -New fluid collection subcutaneously in the left hip area>>Ortho consulted and rec no acute interventions for now.  - Pain control   - Ortho eval: repeat X-R, SANDEEE NWB, B/L LE WBAT, no acute intervention  - PT/Rehab eval - STR    # HTN - controlled  - Continue Losartan  - DASH diet    # HLD - continue Lipitor    # GI PPx - Protonix IV BID  # DVT PPx - SCDs given active GIB    Dispo: Hospice consult - admission on sunday    DNR/DNI/only comfort measures

## 2019-07-18 NOTE — GOALS OF CARE CONVERSATION - PERSONAL ADVANCE DIRECTIVE - TREATMENT GUIDELINES
No artificial nutrition/No IV fluids/No blood draws/No antibiotics/Do not re-hospitalize/DNR Order/Comfort measures only

## 2019-07-18 NOTE — PROGRESS NOTE ADULT - ATTENDING COMMENTS
Patient seen and examined independently. Agree with resident note/ history / physical exam and plan of care with following exceptions/additions/updates. Case discussed with house-staff, nursing and patient/pt decision maker. spoke with daughter at the bedside.   has loose bm, colace stopped.   Vital Signs Last 24 Hrs  T(C): 36.1 (17 Jul 2019 07:43), Max: 37.1 (16 Jul 2019 16:08)  T(F): 97 (17 Jul 2019 07:43), Max: 98.7 (16 Jul 2019 16:08)  HR: 88 (17 Jul 2019 07:43) (87 - 99)  BP: 121/60 (17 Jul 2019 07:43) (119/56 - 125/58)  BP(mean): 86 (16 Jul 2019 19:57) (86 - 86)  RR: 18 (17 Jul 2019 07:43) (16 - 19)  SpO2: 99% (17 Jul 2019 07:43) (98% - 100%)  Physical exam:   constitutional NAD, minimally verbal, awake, Respiratory  lungs CTA, CVS heart RRR, GI: abdomen Soft NT, ND, BS+, skin: intact  neuro exam not able to participate.                         7.5    13.65 )-----------( 164      ( 16 Jul 2019 11:36 )             23.4   07-15    135  |  104  |  41<H>  ----------------------------<  104<H>  4.6   |  21  |  0.8    Ca    7.7<L>      15 Jul 2019 19:40    TPro  4.8<L>  /  Alb  2.5<L>  /  TBili  0.3  /  DBili  x   /  AST  17  /  ALT  20  /  AlkPhos  125<H>  07-15    a/p  1- anemia, due to gib, also has a psoas hematoma, sp tranfusion, now hgb stable, trend hgb, no aggressive measures per daugher/HCP  2- diarrhea, dc colace, check kub to rule our partial obstruction, if diarrhea continues, then check for cdiff. in view of leukocytosis will add vanco po  3- recent UTI, cont abx, repeat cultures, ID eval , nuñez dc'd , cont intermittent cath, family prefers nuñez if pt still has high Post voidal residue.   4- leukocytosis etiology is unknown at this time. awaiting ID and ortho input.   #Progress Note Handoff  Pending (specify):  Consults: palliative, tests: cbc trend( leukocytosis and anemia)   Family discussion: marisa sanz at the bedside. DNR, DNI, no aggressive measures, palliative care consult, does not want hospice at this time( since pt may need transfusion)   Disposition: SNF
Pt seen independently. Agree with above. Physical exam declined. Pt awaiting hospice.    #Progress Note Handoff:  Pending (specify):  hospice  Family discussion: as outlined above  Disposition: hospice

## 2019-07-18 NOTE — GOALS OF CARE CONVERSATION - PERSONAL ADVANCE DIRECTIVE - TREATMENT GUIDELINE COMMENT
Plan for complete comfort measures. No further blood work or finger sticks. No IV hydration or artifical nutrition. No pulse oximetry. No further blood transfusions. Hospice consult for hospice care at home. DNR/DNI. Continue nuñez catheter use for comfort.

## 2019-07-18 NOTE — GOALS OF CARE CONVERSATION - PERSONAL ADVANCE DIRECTIVE - NS PRO AD PATIENT TYPE
Medical Orders for Life-Sustaining Treatment (MOLST)/Living Will/Health Care Proxy (HCP)/Do Not Resuscitate (DNR)

## 2019-07-18 NOTE — PROGRESS NOTE ADULT - ASSESSMENT
· Assessment		  89 F PMH HTN, HLD, and Dementia recent admission s/p fall complicated by L eft superior and inferior pubic ramus fx, as well as left distal radius fracture s/p splint, course complicated by episode of unresponsiveness .    No pyuria  WBC 13.6>9.7    IMPRESSION:  SIRS response secondary to multiple non infectius issues  Stercoral colitis/ GI bleed with melena which has resolved  Left sacral Fx  Left distal radial Fx  Right psoas with chronic hematoma  No evidence of pyelonephritis despite retention    RECOMMENDATIONS  CIC  No ABx  supportive measures  recall prn please

## 2019-07-18 NOTE — GOALS OF CARE CONVERSATION - PERSONAL ADVANCE DIRECTIVE - CONVERSATION DETAILS
Palliative care team met with patient's daughter at bedside to discuss overall goals of care. Patient and daughter is known to the palliative care team from previous admission.     Reviewed patient's current diagnosis, prognosis and treatment options.  Patients daughter verbalized clear understanding of patient's multiple medical conditions and patient's end of life status. Patient's daughter wishes for patient to be comfortable and pain free.      Plan for complete comfort measures. No further blood work or finger sticks. No IV hydration or artifical nutrition. No pulse oximetry. No further blood transfusions. Hospice consult for hospice care at home. DNR/DNI. Continue nuñez catheter use for comfort.    Patients daughter had presented a HCP and Living Will reflecting patient's wishes.    Palliative care team will continue to monitor and provide support.

## 2019-07-18 NOTE — PROGRESS NOTE ADULT - ASSESSMENT
89yFemale being evaluated for goals of care and symptom management. Pt's daughter Natalia who is the legal HCP, met with palliative NP and SW. Spent 60 minutes discussing overall poor prognosis and defining comfort measures. Pt's daughter already wanted to prevent lab draws. Discussed stopping transfusions despite anemia and GI bleeding. She already refused EGD. She knows mom has declined and has no quality of life from dementia. We discussed comfort meds - morphine and Ativan. She has given her Ativan in the past. She needed education regarding morphine use. But she is in agreement to use it for respiratory distress.     Agreed to comfort measures only starting in the hospital. She wants to take her mom home on hospice. Support provided pt crying and saying she is just "so attached to mom" She will benefit from bereavement services of hospice. Also she was encouraged to get a support system before mom goes home.          Recommendations:  DNR/DNI/CMO  D/C aricept and namaneda  d/c atorvastatin  change pantoprazole to po  hospice c/s placed   Roxanol 5mg Sl Q 4 hrs PRN  Ativan 0.5mg po Q 4 hrs PRN for agitation    d/w medical resident

## 2019-07-18 NOTE — PROGRESS NOTE ADULT - SUBJECTIVE AND OBJECTIVE BOX
MASONGARETH  89y, Female      OVERNIGHT EVENTS:    no fevers, NAD  non verbal, non communicative  no nuñez  no diarrhea    VITALS:  T(F): 98, Max: 98.5 (07-17-19 @ 20:27)  HR: 80  BP: 127/60  RR: 18Vital Signs Last 24 Hrs  T(C): 36.7 (18 Jul 2019 06:00), Max: 36.9 (17 Jul 2019 15:51)  T(F): 98 (18 Jul 2019 06:00), Max: 98.5 (17 Jul 2019 20:27)  HR: 80 (18 Jul 2019 06:00) (80 - 86)  BP: 127/60 (18 Jul 2019 06:00) (125/70 - 135/59)  BP(mean): --  RR: 18 (18 Jul 2019 06:00) (18 - 18)  SpO2: 99% (17 Jul 2019 23:15) (98% - 99%)    TESTS & MEASUREMENTS:                        8.7    9.72  )-----------( 113      ( 18 Jul 2019 06:50 )             28.0               Culture - Urine (collected 07-16-19 @ 14:43)  Source: .Urine Clean Catch (Midstream)  Final Report (07-17-19 @ 23:56):    No growth            RADIOLOGY & ADDITIONAL TESTS:    ANTIBIOTICS:

## 2019-07-18 NOTE — PROGRESS NOTE ADULT - SUBJECTIVE AND OBJECTIVE BOX
89yFemale with diagnosis: GASTROINTESTINAL HEMORRHAGE      Patient seen for follow up      PHYSICAL EXAM  Pt eyes open, non verbal  pt without signs of pain or dyspnea      T(C): , Max: 36.9 (15:51)  T(F): 97  HR: 97 (80 - 97)  BP: 110/54 (110/54 - 135/59)  RR: 18 (18 - 18)  SpO2: 99% (98% - 99%)              LABS:                          8.7    9.72  )-----------( 113      ( 18 Jul 2019 06:50 )             28.0                                                                                      07-18    150<H>  |  118<H>  |  34<H>  ----------------------------<  100<H>  4.4   |  21  |  0.9    Ca    8.0<L>      18 Jul 2019 06:50  Mg     1.8     07-18                                                        MEDICATIONS  (STANDING):  atorvastatin 20 milliGRAM(s) Oral at bedtime  chlorhexidine 4% Liquid 1 Application(s) Topical <User Schedule>  donepezil 10 milliGRAM(s) Oral at bedtime  losartan 25 milliGRAM(s) Oral daily  memantine 10 milliGRAM(s) Oral two times a day  pantoprazole  Injectable 40 milliGRAM(s) IV Push every 12 hours  tamsulosin 0.4 milliGRAM(s) Oral at bedtime    MEDICATIONS  (PRN):

## 2019-07-19 PROCEDURE — 99233 SBSQ HOSP IP/OBS HIGH 50: CPT

## 2019-07-19 RX ORDER — CHLORHEXIDINE GLUCONATE 213 G/1000ML
1 SOLUTION TOPICAL
Refills: 0 | Status: DISCONTINUED | OUTPATIENT
Start: 2019-07-19 | End: 2019-07-21

## 2019-07-19 RX ORDER — MEMANTINE HYDROCHLORIDE 10 MG/1
1 TABLET ORAL
Qty: 0 | Refills: 0 | DISCHARGE

## 2019-07-19 RX ORDER — LOSARTAN POTASSIUM 100 MG/1
1 TABLET, FILM COATED ORAL
Qty: 0 | Refills: 0 | DISCHARGE
Start: 2019-07-19

## 2019-07-19 RX ORDER — HALOPERIDOL DECANOATE 100 MG/ML
0.5 INJECTION INTRAMUSCULAR EVERY 8 HOURS
Refills: 0 | Status: DISCONTINUED | OUTPATIENT
Start: 2019-07-19 | End: 2019-07-21

## 2019-07-19 RX ORDER — DONEPEZIL HYDROCHLORIDE 10 MG/1
1 TABLET, FILM COATED ORAL
Qty: 0 | Refills: 0 | DISCHARGE

## 2019-07-19 RX ORDER — LOVASTATIN 20 MG
1 TABLET ORAL
Qty: 0 | Refills: 0 | DISCHARGE

## 2019-07-19 RX ORDER — TRAZODONE HCL 50 MG
50 TABLET ORAL AT BEDTIME
Refills: 0 | Status: DISCONTINUED | OUTPATIENT
Start: 2019-07-19 | End: 2019-07-21

## 2019-07-19 RX ORDER — MORPHINE SULFATE 50 MG/1
0.25 CAPSULE, EXTENDED RELEASE ORAL
Qty: 0 | Refills: 0 | DISCHARGE
Start: 2019-07-19

## 2019-07-19 RX ORDER — ASPIRIN/CALCIUM CARB/MAGNESIUM 324 MG
1 TABLET ORAL
Qty: 0 | Refills: 0 | DISCHARGE

## 2019-07-19 RX ADMIN — TAMSULOSIN HYDROCHLORIDE 0.4 MILLIGRAM(S): 0.4 CAPSULE ORAL at 21:19

## 2019-07-19 RX ADMIN — HALOPERIDOL DECANOATE 0.5 MILLIGRAM(S): 100 INJECTION INTRAMUSCULAR at 21:18

## 2019-07-19 RX ADMIN — Medication 0.5 MILLIGRAM(S): at 11:41

## 2019-07-19 RX ADMIN — HALOPERIDOL DECANOATE 0.5 MILLIGRAM(S): 100 INJECTION INTRAMUSCULAR at 16:03

## 2019-07-19 RX ADMIN — Medication 50 MILLIGRAM(S): at 21:18

## 2019-07-19 RX ADMIN — CHLORHEXIDINE GLUCONATE 1 APPLICATION(S): 213 SOLUTION TOPICAL at 16:50

## 2019-07-19 NOTE — PROGRESS NOTE ADULT - ASSESSMENT
89 F PMH HTN, HLD, and Dementia recent admission s/p fall complicated by Left superior and inferior pubic ramus fracture, as well as left distal radius fracture s/p splint, course complicated by episode of unresponsiveness (seizures ruled out), JAIDA and hematuria secondary urinary retention and UTI presents for evaluation of dark colored stool, clots in stool and Hb 6.2 in [facility]. Pt not responding to transfusions, daughter opting for comfort care    GI bleed/advanced dementia  -comfort measures only   -Roxanol 5mg Sl Q 4 hrs PRN  -Ativan 0.5mg po Q 4 hrs PRN for agitation  -admission to hospice on 7/21    Left radius fracture  -hospice states they cannot accept pt with split  -daughter agreeable to removing splint understanding it may increase pain but can increase morphine if needed  -will remove splint      #Progress Note Handoff:  Pending (specify): splint removal, hospice   Family discussion: d/w palliative and daughter at bedside  Disposition: Home___/SNF___/Other__hospice______/Unknown at this time________    Pt has poor prognosis

## 2019-07-19 NOTE — PROGRESS NOTE ADULT - ASSESSMENT
89yFemale being evaluated for goals of care and symptom management. Pt more anxious today. Daughter at bedside. Pt trying to pull off cast on left upper extremity. Pt is comfort measures only. Request to remove cast (been there x 1 month) and restart her trazadone. Also discussed use of Haldol RTC to prevent her from becoming agitated since she has been more and more agitated in last few months.     Discussed plan for d/c on home hospice. Daughter given support by NP and SW from palliative care. Pt's daughter often teary but is realistic about mom and her prognosis      Recommendations:  DNR/DNI/CMO  d/c left upper extremity cast/splint - spoke to Dr Eddy attending MD  Haldol 0.5 mg SL Q 8 HRS RTC  Ativan 0,5mg Q 4 HRS PRN agitation  Trazadone 50mg Q HS for insomnia   Morphine 5mg Sl Q 4 HRS PRN for respiratory distress  d/c home on hospice care

## 2019-07-19 NOTE — DISCHARGE NOTE PROVIDER - CARE PROVIDER_API CALL
Lexii Ordoñez)  [facility]  [address] [place] [address]  Phone: [phone]  Fax: [phone]  Follow Up Time: Hospice,   Hospice team  Phone: (   )    -  Fax: (   )    -  Follow Up Time:

## 2019-07-19 NOTE — DISCHARGE NOTE PROVIDER - PROVIDER TOKENS
PROVIDER:[TOKEN:[[id number]]] FREE:[LAST:[Hospice],PHONE:[(   )    -],FAX:[(   )    -],ADDRESS:[Hospice team]]

## 2019-07-19 NOTE — PROGRESS NOTE ADULT - SUBJECTIVE AND OBJECTIVE BOX
CHIEF COMPLAINT:    Patient is a 89y old  Female who presents with a chief complaint of dark stools      INTERVAL HPI/OVERNIGHT EVENTS:    Patient seen and examined at bedside. No acute overnight events occurred.    ROS: Unable to obtain    Vital Signs:    No longer checking        PHYSICAL EXAM:  Unable to perform as family declining, requesting comfort care    Consultant(s) Notes Reviewed:  [x ] YES  [ ] NO  Care Discussed with Consultants/Other Providers [ x] YES  [ ] NO-palilative    LABS:                        8.7    9.72  )-----------( 113      ( 18 Jul 2019 06:50 )             28.0     07-18    150<H>  |  118<H>  |  34<H>  ----------------------------<  100<H>  4.4   |  21  |  0.9    Ca    8.0<L>      18 Jul 2019 06:50  Mg     1.8     07-18              Culture - Blood (collected 17 Jul 2019 07:57)  Source: .Blood None  Preliminary Report (18 Jul 2019 14:01):    No growth to date.        RADIOLOGY & ADDITIONAL TESTS:  NO new imaging    Medications:  Standing  chlorhexidine 4% Liquid 1 Application(s) Topical <User Schedule>  haloperidol    Concentrate 0.5 milliGRAM(s) Oral every 8 hours  losartan 25 milliGRAM(s) Oral daily  tamsulosin 0.4 milliGRAM(s) Oral at bedtime  traZODone 50 milliGRAM(s) Oral at bedtime    PRN Meds  LORazepam     Tablet 0.5 milliGRAM(s) Oral every 4 hours PRN  morphine Concentrate 5 milliGRAM(s) Oral every 4 hours PRN      Case discussed with resident  Care discussed with pt

## 2019-07-19 NOTE — DISCHARGE NOTE PROVIDER - NSDCCPCAREPLAN_GEN_ALL_CORE_FT
PRINCIPAL DISCHARGE DIAGNOSIS  Diagnosis: Gastrointestinal hemorrhage, unspecified gastrointestinal hemorrhage type  Assessment and Plan of Treatment: Bleeding from the gastrointestinal tract causing anemia and subsequent fatigue. Bleeding has ceased, the patient will be on comfort care, no invasive diagnostic intervention will be done.      SECONDARY DISCHARGE DIAGNOSES  Diagnosis: Pelvis fracture  Assessment and Plan of Treatment:     Diagnosis: Distal radius fracture, left  Assessment and Plan of Treatment:     Diagnosis: Hypertension  Assessment and Plan of Treatment:     Diagnosis: Dementia  Assessment and Plan of Treatment:

## 2019-07-19 NOTE — PROGRESS NOTE ADULT - ASSESSMENT
89 F PMH HTN, HLD, and Dementia recent admission s/p fall complicated by Left superior and inferior pubic ramus fracture, as well as left distal radius fracture s/p splint, course complicated by episode of unresponsiveness (seizures ruled out), JAIDA and hematuria secondary urinary retention and UTI here for dark colored stool, clots in stool and Hb 6.2 in [facility].     #Advanced dementia  - Palliative care spoke to the patient and family. The daughter is the health care proxy and she needs only comfort measures  -D/c ricept and namaneda, atorvastatin  -IV  pantoprazole changed  to po   -Roxanol 5mg Sl Q 4 hrs PRN  -Ativan 0.5mg po Q 4 hrs PRN for agitation  -Patient is having only couple of spoons of food which is not enough to sustain herself.  -Family denies NG/PEG tube feeds and denied any further blood transfusions.  - Continue Memantine and Donepezil   -Hospice consult- admission available on Sunday  -MOLST form signed by daughter Natlaia(Health care proxy)    # Acute blood loss anemia likely due to GIB - s/p 2 pRBC in 1 in ED and 1 yesterday night  -resolved  - CTA Abdomen with no blood extravasation  -Aspirin 81mg held  - Family prefers non-invasive management      # Leukocytosis - reactive vs infectious  - Recent UTI due to Raman - discontinued Raman for TOV with intermittent cath. Daughter requesting to replace for comfort  - CT A/P with stercoral colitis  - ID eval- no ABs     # Recent superior/inferior pubic ramus and Left distal radius fracture s/p splinting of LUE     -New fluid collection subcutaneously in the left hip area>>Ortho consulted and rec no acute interventions for now.  - Pain control   - Ortho eval: repeat X-R, LUE NWB, B/L LE WBAT, no acute intervention  - PT/Rehab eval - STR    # HTN - controlled  - Continue Losartan  - DASH diet    # HLD - continue Lipitor    # GI PPx - Protonix IV BID  # DVT PPx - SCDs given active GIB    Dispo: Hospice consult - admission on sunday    DNR/DNI/only comfort measures

## 2019-07-19 NOTE — PROGRESS NOTE ADULT - SUBJECTIVE AND OBJECTIVE BOX
89yFemale with diagnosis: GASTROINTESTINAL HEMORRHAGE      Patient seen for follow up      PHYSICAL EXAM  Pt with period of alertness and agitation      T(C): --  T(F): --  HR: --  BP: --  RR: --  SpO2: --              LABS:                          8.7    9.72  )-----------( 113      ( 18 Jul 2019 06:50 )             28.0                                                                                      07-18    150<H>  |  118<H>  |  34<H>  ----------------------------<  100<H>  4.4   |  21  |  0.9    Ca    8.0<L>      18 Jul 2019 06:50  Mg     1.8     07-18                                                        MEDICATIONS  (STANDING):  chlorhexidine 4% Liquid 1 Application(s) Topical <User Schedule>  haloperidol    Concentrate 0.5 milliGRAM(s) Oral every 8 hours  losartan 25 milliGRAM(s) Oral daily  tamsulosin 0.4 milliGRAM(s) Oral at bedtime  traZODone 50 milliGRAM(s) Oral at bedtime    MEDICATIONS  (PRN):  LORazepam     Tablet 0.5 milliGRAM(s) Oral every 4 hours PRN Agitation  morphine Concentrate 5 milliGRAM(s) Oral every 4 hours PRN respiratory distress

## 2019-07-19 NOTE — PROGRESS NOTE ADULT - SUBJECTIVE AND OBJECTIVE BOX
Patient has significant dementia.   Patient is complaining of itchiness over her left upper limb splint cast.  Patient is comfort care. No blood draws, no escalation of treatment.    MEDICATIONS  (STANDING):  chlorhexidine 4% Liquid 1 Application(s) Topical <User Schedule>  haloperidol    Concentrate 0.5 milliGRAM(s) Oral every 8 hours  losartan 25 milliGRAM(s) Oral daily  tamsulosin 0.4 milliGRAM(s) Oral at bedtime  traZODone 50 milliGRAM(s) Oral at bedtime    MEDICATIONS  (PRN):  LORazepam     Tablet 0.5 milliGRAM(s) Oral every 4 hours PRN Agitation  morphine Concentrate 5 milliGRAM(s) Oral every 4 hours PRN respiratory distress

## 2019-07-19 NOTE — DISCHARGE NOTE PROVIDER - HOSPITAL COURSE
89 year old female patient known hypertensive, dyslipidemic, dementia. Has had a recent admission s/p fall complicated by Left superior and inferior pubic ramus fracture, as well as left distal radius fracture s/p splint, course complicated by episode of unresponsiveness (seizures ruled out), JAIDA and hematuria secondary urinary retention and UTI.        Presented this admission for dark colored stool with occasional blood clots and Hb 6.2 in [facility]. Admitted for management for GI bleed.    During stay, given her previous medical history, patient placed on DNR/DNI comfort care.    Problem list addressed as follows:        # Acute blood loss anemia likely due to GIB, received 2 PRBCs in the ER.    - Resolved after day 2 of hospitalization with no apparent blood in stool while in stay.    CT-angio abdomen done for investigation showed no evidence of intravenous contrast extravasation into bowel lumen to suggest active gastrointestinal bleed.     - Posttransfusion Hb is 8.7, discontinued lab workup afterwards.    - Family prefers non-invasive management        #Advanced dementia    - Palliative care spoke to the patient and family. The daughter is the health care proxy and she needs only comfort measures    - D/c Aricept and namaneda, atorvastatin    - Ativan 0.5mg po Q 4 hrs PRN for agitation    - Patient is having only couple of spoons of food which is not enough to sustain herself.    - Family denies NG/PEG tube feeds and denied any further blood transfusions.    - Continue Memantine and Donepezil     - MOLST form signed by daughter Natalia(Health care proxy)        # Leukocytosis - reactive vs infectious    - Recent UTI due to Raman - discontinued Raman for TOV with intermittent cath. Daughter requesting to replace for comfort    - ID eval- no ABs         # Recent superior/inferior pubic ramus and Left distal radius fracture s/p splinting of LUE     Splint removed as the patient was complaining of itching. Daughter insisted on removal.

## 2019-07-20 VITALS — RESPIRATION RATE: 18 BRPM | SYSTOLIC BLOOD PRESSURE: 150 MMHG | TEMPERATURE: 98 F | HEART RATE: 77 BPM

## 2019-07-20 PROCEDURE — 99233 SBSQ HOSP IP/OBS HIGH 50: CPT

## 2019-07-20 RX ORDER — TRAMADOL HYDROCHLORIDE 50 MG/1
50 TABLET ORAL EVERY 4 HOURS
Refills: 0 | Status: DISCONTINUED | OUTPATIENT
Start: 2019-07-20 | End: 2019-07-21

## 2019-07-20 RX ADMIN — HALOPERIDOL DECANOATE 0.5 MILLIGRAM(S): 100 INJECTION INTRAMUSCULAR at 04:58

## 2019-07-20 RX ADMIN — TAMSULOSIN HYDROCHLORIDE 0.4 MILLIGRAM(S): 0.4 CAPSULE ORAL at 22:15

## 2019-07-20 RX ADMIN — TRAMADOL HYDROCHLORIDE 50 MILLIGRAM(S): 50 TABLET ORAL at 19:32

## 2019-07-20 RX ADMIN — LOSARTAN POTASSIUM 25 MILLIGRAM(S): 100 TABLET, FILM COATED ORAL at 05:02

## 2019-07-20 RX ADMIN — Medication 50 MILLIGRAM(S): at 22:15

## 2019-07-20 RX ADMIN — TRAMADOL HYDROCHLORIDE 50 MILLIGRAM(S): 50 TABLET ORAL at 20:02

## 2019-07-20 RX ADMIN — CHLORHEXIDINE GLUCONATE 1 APPLICATION(S): 213 SOLUTION TOPICAL at 05:08

## 2019-07-20 RX ADMIN — MORPHINE SULFATE 5 MILLIGRAM(S): 50 CAPSULE, EXTENDED RELEASE ORAL at 15:04

## 2019-07-20 RX ADMIN — HALOPERIDOL DECANOATE 0.5 MILLIGRAM(S): 100 INJECTION INTRAMUSCULAR at 22:15

## 2019-07-20 NOTE — PROGRESS NOTE ADULT - SUBJECTIVE AND OBJECTIVE BOX
CHIEF COMPLAINT:    Patient is a 89y old  Female who presents with a chief complaint of Dark Stools (19 Jul 2019 17:20)      INTERVAL HPI/OVERNIGHT EVENTS:    Patient seen and examined at bedside. No acute overnight events occurred.    ROS: Unable to obtain    Vital Signs:    T(F): 98.1 (07-20-19 @ 06:00), Max: 98.1 (07-20-19 @ 06:00)  HR: 77 (07-20-19 @ 06:00) (77 - 77)  BP: 150/- (07-20-19 @ 06:00) (150/- - 150/-)  RR: 18 (07-20-19 @ 06:00) (18 - 18)  SpO2: --  I&O's Summary    19 Jul 2019 07:01  -  20 Jul 2019 07:00  --------------------------------------------------------  IN: 0 mL / OUT: 300 mL / NET: -300 mL    20 Jul 2019 07:01  -  20 Jul 2019 16:14  --------------------------------------------------------  IN: 0 mL / OUT: 900 mL / NET: -900 mL            PHYSICAL EXAM:  Declining exam      LABS:      No new labs              RADIOLOGY & ADDITIONAL TESTS:  No new imaging    Medications:  Standing  chlorhexidine 4% Liquid 1 Application(s) Topical <User Schedule>  haloperidol    Concentrate 0.5 milliGRAM(s) Oral every 8 hours  losartan 25 milliGRAM(s) Oral daily  tamsulosin 0.4 milliGRAM(s) Oral at bedtime  traZODone 50 milliGRAM(s) Oral at bedtime    PRN Meds  LORazepam     Tablet 0.5 milliGRAM(s) Oral every 4 hours PRN  morphine Concentrate 5 milliGRAM(s) Oral every 4 hours PRN      Case discussed with resident  Care discussed with pt

## 2019-07-20 NOTE — PROGRESS NOTE ADULT - PROVIDER SPECIALTY LIST ADULT
Internal Medicine
Palliative Care
Internal Medicine
Infectious Disease
Palliative Care

## 2019-07-20 NOTE — PROGRESS NOTE ADULT - ASSESSMENT
89 F PMH HTN, HLD, and Dementia recent admission s/p fall complicated by Left superior and inferior pubic ramus fracture, as well as left distal radius fracture s/p splint, course complicated by episode of unresponsiveness (seizures ruled out), JAIDA and hematuria secondary urinary retention and UTI presents for evaluation of dark colored stool, clots in stool and Hb 6.2 in [facility]. Pt not responding to transfusions, daughter opting for comfort care    GI bleed/advanced dementia  -comfort measures only   -Roxanol 5mg Sl Q 4 hrs PRN  -Ativan 0.5mg po Q 4 hrs PRN for agitation  -admission to hospice on 7/21    Left radius fracture  -hospice states they cannot accept pt with split  -splint removed today with sister present      #Progress Note Handoff:  Pending (specify):  hospice   Family discussion: d/w sister  Disposition: Home___/SNF___/Other__hospice______/Unknown at this time________    Pt has poor prognosis

## 2019-07-21 PROCEDURE — 99238 HOSP IP/OBS DSCHRG MGMT 30/<: CPT

## 2019-07-21 RX ORDER — TRAMADOL HYDROCHLORIDE 50 MG/1
1 TABLET ORAL
Qty: 0 | Refills: 0 | DISCHARGE
Start: 2019-07-21

## 2019-07-21 RX ADMIN — TRAMADOL HYDROCHLORIDE 50 MILLIGRAM(S): 50 TABLET ORAL at 09:36

## 2019-07-21 RX ADMIN — CHLORHEXIDINE GLUCONATE 1 APPLICATION(S): 213 SOLUTION TOPICAL at 05:44

## 2019-07-21 RX ADMIN — HALOPERIDOL DECANOATE 0.5 MILLIGRAM(S): 100 INJECTION INTRAMUSCULAR at 05:45

## 2019-07-21 NOTE — DISCHARGE NOTE NURSING/CASE MANAGEMENT/SOCIAL WORK - NSDCDPATPORTLINK_GEN_ALL_CORE
You can access the [facility] Portal, offered by [facility], by registering with the following website: [email or web address]

## 2019-07-21 NOTE — CHART NOTE - NSCHARTNOTEFT_GEN_A_CORE
Pt seen and examined at bedside. Pt discharged to hospice this morning. Care d/w daughter at bedside    Physical exam declined today.

## 2019-07-22 LAB
CULTURE RESULTS: SIGNIFICANT CHANGE UP
SPECIMEN SOURCE: SIGNIFICANT CHANGE UP

## 2019-07-30 DIAGNOSIS — K52.89 OTHER SPECIFIED NONINFECTIVE GASTROENTERITIS AND COLITIS: ICD-10-CM

## 2019-07-30 DIAGNOSIS — D62 ACUTE POSTHEMORRHAGIC ANEMIA: ICD-10-CM

## 2019-07-30 DIAGNOSIS — E78.5 HYPERLIPIDEMIA, UNSPECIFIED: ICD-10-CM

## 2019-07-30 DIAGNOSIS — I10 ESSENTIAL (PRIMARY) HYPERTENSION: ICD-10-CM

## 2019-07-30 DIAGNOSIS — Z86.73 PERSONAL HISTORY OF TRANSIENT ISCHEMIC ATTACK (TIA), AND CEREBRAL INFARCTION WITHOUT RESIDUAL DEFICITS: ICD-10-CM

## 2019-07-30 DIAGNOSIS — F03.90 UNSPECIFIED DEMENTIA WITHOUT BEHAVIORAL DISTURBANCE: ICD-10-CM

## 2019-07-30 DIAGNOSIS — T14.8XXD OTHER INJURY OF UNSPECIFIED BODY REGION, SUBSEQUENT ENCOUNTER: ICD-10-CM

## 2019-07-30 DIAGNOSIS — Z79.82 LONG TERM (CURRENT) USE OF ASPIRIN: ICD-10-CM

## 2019-07-30 DIAGNOSIS — Z51.5 ENCOUNTER FOR PALLIATIVE CARE: ICD-10-CM

## 2019-07-30 DIAGNOSIS — K92.2 GASTROINTESTINAL HEMORRHAGE, UNSPECIFIED: ICD-10-CM

## 2019-07-30 DIAGNOSIS — K92.1 MELENA: ICD-10-CM

## 2019-07-30 DIAGNOSIS — R65.10 SYSTEMIC INFLAMMATORY RESPONSE SYNDROME (SIRS) OF NON-INFECTIOUS ORIGIN WITHOUT ACUTE ORGAN DYSFUNCTION: ICD-10-CM

## 2019-07-30 DIAGNOSIS — S32.592D OTHER SPECIFIED FRACTURE OF LEFT PUBIS, SUBSEQUENT ENCOUNTER FOR FRACTURE WITH ROUTINE HEALING: ICD-10-CM

## 2019-07-30 DIAGNOSIS — R33.9 RETENTION OF URINE, UNSPECIFIED: ICD-10-CM

## 2019-07-30 DIAGNOSIS — D72.829 ELEVATED WHITE BLOOD CELL COUNT, UNSPECIFIED: ICD-10-CM

## 2019-07-30 DIAGNOSIS — Z66 DO NOT RESUSCITATE: ICD-10-CM

## 2019-07-30 DIAGNOSIS — K59.00 CONSTIPATION, UNSPECIFIED: ICD-10-CM

## 2019-07-30 DIAGNOSIS — S32.10XD UNSPECIFIED FRACTURE OF SACRUM, SUBSEQUENT ENCOUNTER FOR FRACTURE WITH ROUTINE HEALING: ICD-10-CM

## 2019-07-30 DIAGNOSIS — S32.512D FRACTURE OF SUPERIOR RIM OF LEFT PUBIS, SUBSEQUENT ENCOUNTER FOR FRACTURE WITH ROUTINE HEALING: ICD-10-CM
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
## 2019-07-30 DIAGNOSIS — S52.502D UNSPECIFIED FRACTURE OF THE LOWER END OF LEFT RADIUS, SUBSEQUENT ENCOUNTER FOR CLOSED FRACTURE WITH ROUTINE HEALING: ICD-10-CM

## 2019-10-18 NOTE — ED ADULT TRIAGE NOTE - NS ED NURSE DIRECT TO ROOM YN
Called patient and conveyed the results below. Patient verbalized understanding. Labs ordered.     No

## 2021-07-26 NOTE — PROVIDER CONTACT NOTE (OTHER) - REASON
FWW delivered to patient    Patient's family member requesting to speak to MD about patient's home medication. patient has not been getting her trazadone at night

## 2021-08-09 NOTE — CHART NOTE - NSCHARTNOTEFT_GEN_A_CORE
GENERAL SURGERY FLOOR TRANSFER NOTE    89y [id number] transferred to medicine from trauma service.    SUBJECTIVE:  NAD, pain controlled.      PUBIC RAMUS FRACTURE;RADIUS AND ULNA DISTAL FRACTURE;FALL,INITIAL ENCOUNTE  PUBIC RAMUS FRACTURE;RADIUS AND ULNA DISTAL FRACTURE  ^PUBIC RAMUS FRACTURE;RADIUS AND ULNA DISTAL FRACTURE;FALL,INITIAL ENCOUNTE  No pertinent family history in first degree relatives  Handoff  Hyperlipidemia  Hypertension  Constipation  TIA (transient ischemic attack)  Urinary retention  No pertinent past medical history  Pubic ramus fracture  No significant past surgical history  FALL/HIP PAIN/WRIST SWOLLEN  2  Fall, initial encounter  Radius and ulna distal fracture    No Known Allergies    acetaminophen   Tablet .. 650 milliGRAM(s) Oral every 6 hours  aspirin  chewable 81 milliGRAM(s) Oral daily  atorvastatin 20 milliGRAM(s) Oral at bedtime  chlorhexidine 4% Liquid 1 Application(s) Topical <User Schedule>  donepezil 10 milliGRAM(s) Oral at bedtime  heparin  Injectable 5000 Unit(s) SubCutaneous every 8 hours  LORazepam     Tablet 0.5 milliGRAM(s) Oral three times a day PRN  losartan 25 milliGRAM(s) Oral daily  memantine 10 milliGRAM(s) Oral two times a day  pantoprazole    Tablet 40 milliGRAM(s) Oral before breakfast  sodium chloride 0.9%. 1000 milliLiter(s) IV Continuous <Continuous>      OBJECTIVE:    T(C): 35.9 (06-24-19 @ 07:30), Max: 36 (06-23-19 @ 15:08)  HR: 74 (06-24-19 @ 07:30) (74 - 89)  BP: 151/68 (06-24-19 @ 07:30) (112/59 - 151/68)  RR: 18 (06-24-19 @ 07:30) (18 - 19)      I&O's Summary    23 Jun 2019 07:01  -  24 Jun 2019 07:00  --------------------------------------------------------  IN: 1355 mL / OUT: 0 mL / NET: 1355 mL    24 Jun 2019 07:01  -  24 Jun 2019 11:47  --------------------------------------------------------  IN: 300 mL / OUT: 0 mL / NET: 300 mL                              8.2    10.33 )-----------( 113      ( 24 Jun 2019 01:50 )             25.2       06-24    140  |  106  |  32<H>  ----------------------------<  95  3.9   |  25  |  1.0    Ca    8.1<L>      24 Jun 2019 01:50  Phos  2.6     06-24  Mg     2.2     06-24        MEDICATIONS  (STANDING):  acetaminophen   Tablet .. 650 milliGRAM(s) Oral every 6 hours  aspirin  chewable 81 milliGRAM(s) Oral daily  atorvastatin 20 milliGRAM(s) Oral at bedtime  chlorhexidine 4% Liquid 1 Application(s) Topical <User Schedule>  donepezil 10 milliGRAM(s) Oral at bedtime  heparin  Injectable 5000 Unit(s) SubCutaneous every 8 hours  losartan 25 milliGRAM(s) Oral daily  memantine 10 milliGRAM(s) Oral two times a day  pantoprazole    Tablet 40 milliGRAM(s) Oral before breakfast  sodium chloride 0.9%. 1000 milliLiter(s) (75 mL/Hr) IV Continuous <Continuous>    MEDICATIONS  (PRN):  LORazepam     Tablet 0.5 milliGRAM(s) Oral three times a day PRN Anxiety        PHYSICAL EXAM:    PHYSICAL EXAM:  GENERAL:  Neuro: dementia   HEAD:  Atraumatic, Normocephalic  EYES: EOMI, PERRLA, conjunctiva and sclera clear  ENMT: No tonsillar erythema, exudates, or enlargement; Moist mucous membranes  NECK: No JVD  HEART: Regular rate and rhythm  RESPIRATORY: CTA B/L  ABDOMEN: Soft, Nontender, Nondistended  EXTREMITIES:  2+ Peripheral Pulses, No clubbing, cyanosis, or edema  SKIN: warm, dry, normal color, no rash or abnormal lesions    A/P:  89y Female patient admitted S/P Acute left superior and inferior pubic rami fracture, left distal radial fracture and ulnar styloid fracture    Plan:  Transferred to medical service, please follow:  F/u Neuro, MR brain, EEG  f/u DNR status, daughter states has paperwork.  discuss goals of care  NWB LUE, partial WB LLE  Dementia medications  Pt/Rehab  Diet regular  IVL  Pain control  labs  dispo    Accepted by Dr. Prince, spoke with and signed patient out to Senior Resident Dr. Mirza at [phone]. Yes

## 2021-09-20 NOTE — DISCHARGE NOTE NURSING/CASE MANAGEMENT/SOCIAL WORK - MODE OF TRANSPORTATION
[Cystocele (Obstetric)] : no [Vaginal Wall Prolapse] : no [Rectal Prolapse] : no [Unable To Restrain Bowel Movement] : moderate [Hematuria] : no [x1] : nocturia once nightly [Urinary Stream Starts And Stops] : no [Incomplete Emptying Of Bladder] : no [Urinary Frequency] : no [Feelings Of Urinary Urgency] : no [Pain During Urination (Dysuria)] : no [Urinary Tract Infection] : moderate [Uses ___ pads per day] : uses [unfilled] pad(s) per day [Constipation Obstructed Defecation] : no [Incomplete Emptying Of Stool] : no Ambulance [] : no [Pelvic Pain] : no [Vaginal Pain] : no [Vulvar Pain] : no [Sexual Dysfunction, NOS] : no [FreeTextEntry1] : \par Holly presents for followup she has had urethral bulking with no improvement, she now today is back on myrbetriq 50mg + vesicare 5mg and reports she is 50% better, she is frustrated due to all her issues including pain, urine is the least of her issues, she reports sometimes she feels wet, unsure if vaginal discharge \par \par UDS with AMADOR\par

## 2022-07-15 NOTE — ED ADULT TRIAGE NOTE - BMI (KG/M2)
Pt called and stated that Stephen rOtega is requesting  a letter from  proving that  referred pt to [facility] . Pt says this might have happen  probably 6-10 years ago . Pt says the letter can be sent to him or he can pick it up. Please call and advise. 21.5

## 2023-10-02 NOTE — DISCHARGE NOTE PROVIDER - PROVIDER TOKENS
Chief Complaint   Patient presents with    Hypertension     3 month f/up    Diabetes    Anxiety     1. Have you been to the ER, urgent care clinic since your last visit? Hospitalized since your last visit? No    2. Have you seen or consulted any other health care providers outside of the [address] [place] [address] Avenue since your last visit? Include any pap smears or colon screening.  No FREE:[LAST:[terence],FIRST:[van],PHONE:[(   )    -],FAX:[(   )    -],ADDRESS:[PMD]],PROVIDER:[TOKEN:[[id number]]] (ACTOS) 45 MG tablet REORDER    TRULICITY 3 UI/9.8GE SOPN REORDER    lisinopril (PRINIVIL;ZESTRIL) 5 MG tablet REORDER       Current Outpatient Medications   Medication Sig Dispense Refill    LORazepam (ATIVAN) 0.5 MG tablet TAKE 1 TABLET BY MOUTH TWICE A DAY AS NEEDED FOR ANXIETY MAX 1 MG PER DAY      pioglitazone (ACTOS) 45 MG tablet Take 1 tablet by mouth daily 90 tablet 1    atorvastatin (LIPITOR) 20 MG tablet Take 1 tablet by mouth daily 90 tablet 1    lisinopril (PRINIVIL;ZESTRIL) 5 MG tablet Take 1 tablet by mouth daily 90 tablet 1    metFORMIN (GLUCOPHAGE) 500 MG tablet Take 1 tablet by mouth in the morning, at noon, and at bedtime 773 tablet 1    TRULICITY 3 MV/0.6MF SOPN INJECT 0.5 MILLILITER (3 MG) BY SUBCUTANEOUS ROUTE EVERY 7 DAYS FOR 90 DAYS 6 mL 1     No current facility-administered medications for this visit. Recommended healthy diet low in carbohydrates, fats, sodium and cholesterol. Recommended regular cardiovascular exercise 3-6 times per week for 30-60 minutes daily. Verbal and written instructions (see AVS) provided. Patient expresses understanding of diagnosis and treatment plan. Return in about 3 months (around 1/2/2024) for DM with PCP. No future appointments.